# Patient Record
Sex: FEMALE | Race: WHITE | NOT HISPANIC OR LATINO
[De-identification: names, ages, dates, MRNs, and addresses within clinical notes are randomized per-mention and may not be internally consistent; named-entity substitution may affect disease eponyms.]

---

## 2018-05-29 ENCOUNTER — APPOINTMENT (OUTPATIENT)
Dept: PHYSICAL MEDICINE AND REHAB | Facility: CLINIC | Age: 78
End: 2018-05-29
Payer: MEDICARE

## 2018-05-29 VITALS
BODY MASS INDEX: 25.07 KG/M2 | SYSTOLIC BLOOD PRESSURE: 146 MMHG | DIASTOLIC BLOOD PRESSURE: 70 MMHG | TEMPERATURE: 98.6 F | OXYGEN SATURATION: 98 % | WEIGHT: 156 LBS | HEIGHT: 66 IN | HEART RATE: 79 BPM | RESPIRATION RATE: 18 BRPM

## 2018-05-29 DIAGNOSIS — R29.898 OTHER SYMPTOMS AND SIGNS INVOLVING THE MUSCULOSKELETAL SYSTEM: ICD-10-CM

## 2018-05-29 PROCEDURE — 99203 OFFICE O/P NEW LOW 30 MIN: CPT

## 2018-05-29 RX ORDER — ALPRAZOLAM 2 MG/1
2 TABLET ORAL
Refills: 0 | Status: ACTIVE | COMMUNITY

## 2018-05-29 RX ORDER — NAPROXEN 500 MG/1
500 TABLET ORAL
Refills: 0 | Status: ACTIVE | COMMUNITY

## 2018-05-29 RX ORDER — MULTIVIT-MIN/FOLIC/VIT K/LYCOP 400-300MCG
10 MCG TABLET ORAL
Refills: 0 | Status: ACTIVE | COMMUNITY

## 2018-05-29 RX ORDER — CYANOCOBALAMIN (VITAMIN B-12) 100 MCG
100 TABLET ORAL
Refills: 0 | Status: ACTIVE | COMMUNITY

## 2018-05-29 RX ORDER — FLUTICASONE PROPIONATE AND SALMETEROL 50; 100 UG/1; UG/1
100-50 POWDER RESPIRATORY (INHALATION)
Refills: 0 | Status: ACTIVE | COMMUNITY

## 2018-05-29 RX ORDER — VALSARTAN 160 MG/1
160 TABLET, COATED ORAL
Refills: 0 | Status: ACTIVE | COMMUNITY

## 2018-05-29 RX ORDER — ASPIRIN 81 MG/1
81 TABLET, CHEWABLE ORAL
Refills: 0 | Status: ACTIVE | COMMUNITY

## 2018-05-29 RX ORDER — ALBUTEROL SULFATE 90 UG/1
108 (90 BASE) AEROSOL, METERED RESPIRATORY (INHALATION)
Refills: 0 | Status: ACTIVE | COMMUNITY

## 2018-05-29 RX ORDER — ASCORBIC ACID 500 MG
500 TABLET ORAL
Refills: 0 | Status: ACTIVE | COMMUNITY

## 2018-05-29 RX ORDER — ATORVASTATIN CALCIUM 80 MG/1
80 TABLET, FILM COATED ORAL
Refills: 0 | Status: ACTIVE | COMMUNITY

## 2019-02-19 ENCOUNTER — APPOINTMENT (OUTPATIENT)
Dept: BREAST CENTER | Facility: CLINIC | Age: 79
End: 2019-02-19
Payer: MEDICARE

## 2019-02-19 VITALS — BODY MASS INDEX: 24.8 KG/M2 | WEIGHT: 158 LBS | HEIGHT: 67 IN

## 2019-02-19 VITALS — DIASTOLIC BLOOD PRESSURE: 75 MMHG | SYSTOLIC BLOOD PRESSURE: 153 MMHG | HEART RATE: 62 BPM

## 2019-02-19 DIAGNOSIS — Z85.3 PERSONAL HISTORY OF MALIGNANT NEOPLASM OF BREAST: ICD-10-CM

## 2019-02-19 PROCEDURE — 99215 OFFICE O/P EST HI 40 MIN: CPT

## 2019-02-19 RX ORDER — HYDROCORTISONE ACETATE 25 MG/1
25 SUPPOSITORY RECTAL
Refills: 0 | Status: ACTIVE | COMMUNITY

## 2019-02-19 RX ORDER — CLARITHROMYCIN 500 MG/1
TABLET, FILM COATED ORAL
Refills: 0 | Status: ACTIVE | COMMUNITY

## 2019-02-19 NOTE — HISTORY OF PRESENT ILLNESS
[FreeTextEntry1] : This is a 78  year old female s/p right lumpectomy in 11/2004 (Dr. 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 Pt did not have radiation therapy and tumor board consensus was no RT. [place] [facility] [place].\par \par Patient has no breast complaints today but approximately 3 weeks ago she had severe itching in the right lateral breast for 4 days.  This resolved with hydrocortisone.\par \par She does SBE irregularly.\par She has not noticed a change in her breast or a breast lump.\par She has not noticed a change in her nipple or nipple area.\par She has not noticed a change in the skin of the breast.\par She is not experiencing nipple discharge.\par She is not experiencing breast pain. She has bilateral breast sensitivity which is chronic.\par She has not noticed a lump or lymph node under the armpit. \par \par BREAST CANCER RISK FACTORS\par Menarche:  14\par Menopause: 57\par Grav:   0  Para: 0\par Age at first live birth: n/a\par Nursed: n/a\par Hysterectomy: N\par Oophorectomy: N\par OCP: Y 1y\par HRT: N\par Last pap/pelvic exam: 1/2018 WNL Pt recently had some spotting bur workup shoed no suspicious findings. \par Related family history: mat aunt BCA@70\par Ashkenazi: N\par Ty[place]/NCI lifetime risk: n/a\par BRCA testing: N\par \par Last mammogram:  11/26/2018                           Location: [place]\par Report reviewed.                                 Images reviewed.\par Results: Birads 2\par Heterogeneously dense breast tissue. No evidence of malignancy\par \par Last ultrasound:   11/26/2018                              Location: [place]\par Report reviewed.                                 Images reviewed. \par Results: Birads 2\par Heterogeneously dense breast tissue. No evidence of malignancy\par \par Last MRI:   3/30/2016                                          Location: [place]\par Report reviewed.\par Results: Birads 2\par No evidnece of malignancy\par

## 2019-02-19 NOTE — ASSESSMENT
[FreeTextEntry1] : This is a 78-year-old high-risk patient\par When mammogram and ultrasound November December 2019\par We reviewed risk reduction strategies including maintaining a BMI <25, limiting red meat intake and alcoholic beverages to 3 per week and exercise (150 min/ week low intensity or 75 min/week high intensity). And maintaining a normal vitamin D level.\par \par folllow up with me in 1 year\par

## 2019-02-19 NOTE — REVIEW OF SYSTEMS
[Cough] : cough [Joint Pain] : joint pain [Joint Swelling] : joint swelling [Anxiety] : anxiety [Negative] : Neurological

## 2020-02-18 ENCOUNTER — APPOINTMENT (OUTPATIENT)
Dept: BREAST CENTER | Facility: CLINIC | Age: 80
End: 2020-02-18

## 2020-11-16 ENCOUNTER — APPOINTMENT (OUTPATIENT)
Dept: BREAST CENTER | Facility: CLINIC | Age: 80
End: 2020-11-16
Payer: MEDICARE

## 2020-11-16 VITALS
HEIGHT: 67 IN | DIASTOLIC BLOOD PRESSURE: 72 MMHG | BODY MASS INDEX: 24.8 KG/M2 | HEART RATE: 64 BPM | SYSTOLIC BLOOD PRESSURE: 157 MMHG | WEIGHT: 158 LBS

## 2020-11-16 DIAGNOSIS — Z82.69 FAMILY HISTORY OF OTHER DISEASES OF THE MUSCULOSKELETAL SYSTEM AND CONNECTIVE TISSUE: ICD-10-CM

## 2020-11-16 DIAGNOSIS — Z80.3 FAMILY HISTORY OF MALIGNANT NEOPLASM OF BREAST: ICD-10-CM

## 2020-11-16 PROCEDURE — 99215 OFFICE O/P EST HI 40 MIN: CPT

## 2020-11-16 RX ORDER — PANTOPRAZOLE SODIUM 20 MG/1
TABLET, DELAYED RELEASE ORAL
Refills: 0 | Status: ACTIVE | COMMUNITY

## 2020-11-16 RX ORDER — FERROUS GLUCONATE 225(27)MG
240 (27 FE) TABLET ORAL
Refills: 0 | Status: DISCONTINUED | COMMUNITY
End: 2020-11-16

## 2020-11-16 RX ORDER — MONTELUKAST SODIUM 10 MG/1
10 TABLET, FILM COATED ORAL
Refills: 0 | Status: DISCONTINUED | COMMUNITY
End: 2020-11-16

## 2020-11-16 RX ORDER — VALSARTAN 40 MG/1
TABLET ORAL
Refills: 0 | Status: DISCONTINUED | COMMUNITY
End: 2020-11-16

## 2020-11-16 RX ORDER — ALBUTEROL SULFATE 90 UG/1
INHALANT RESPIRATORY (INHALATION)
Refills: 0 | Status: DISCONTINUED | COMMUNITY
End: 2020-11-16

## 2020-11-16 NOTE — ASSESSMENT
[FreeTextEntry1] : This is a 80-year-old high-risk patient\par When mammogram and ultrasound November 2021\par We reviewed risk reduction strategies including maintaining a BMI <25, limiting red meat intake and alcoholic beverages to 3 per week and exercise (150 min/ week low intensity or 75 min/week high intensity). And maintaining a normal vitamin D level.\par \par folllow up with me in 1 year\par She knows to call or return sooner should any concerns or questions arise.\par \par

## 2020-11-16 NOTE — PHYSICAL EXAM
[Normocephalic] : normocephalic [Atraumatic] : atraumatic [Supple] : supple [No Supraclavicular Adenopathy] : no supraclavicular adenopathy [Examined in the supine and seated position] : examined in the supine and seated position [No dominant masses] : no dominant masses in right breast  [No dominant masses] : no dominant masses left breast [No Nipple Retraction] : no left nipple retraction [No Nipple Discharge] : no left nipple discharge [No Axillary Lymphadenopathy] : no left axillary lymphadenopathy [No Edema] : no edema [No Rashes] : no rashes [No Ulceration] : no ulceration [Symmetrical] : symmetrical [de-identified] : very sensitive to touch [de-identified] : healed periareolar incision [de-identified] : healed periareolar incision

## 2020-11-16 NOTE — HISTORY OF PRESENT ILLNESS
[FreeTextEntry1] : This is a 80 year old female s/p right lumpectomy in 11/2004 (Dr. 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 Pt did not have radiation therapy and tumor board consensus was no RT. She also had 2nd opinion at [place] also supporting no RTx.\par \par Patient has no breast complaints today.\par \par She does SBE irregularly.\par She has not noticed a change in her breast or a breast lump.\par She has not noticed a change in her nipple or nipple area.\par She has not noticed a change in the skin of the breast.\par She is not experiencing nipple discharge.\par She is not experiencing breast pain. She has bilateral breast sensitivity which is chronic.\par She has not noticed a lump or lymph node under the armpit. \par \par BREAST CANCER RISK FACTORS\par Menarche:  14\par Menopause: 57\par Grav:   0  Para: 0\par Age at first live birth: n/a\par Nursed: n/a\par Hysterectomy: N\par Oophorectomy: N\par OCP: Y 1y\par HRT: N\par Last pap/pelvic exam: Spring 2020 WNL \par Related family history: mat aunt BCA@70\par Ashkenazi: N\par Tyrer-[place]/NCI lifetime risk: n/a\par BRCA testing: N\par \par Last mammogram:  11/16/2020                          Location: [place]\par Report unavailable for review.                                 Images reviewed.\par \par Last ultrasound:   11/16/2020                             [place]: [place]\par Report unavailable review.                                 Images reviewed. \par \par \par Last MRI:   3/30/2016                                          Location: [place]\par Report reviewed.\par Results: Birads 2\par No evidence of malignancy\par

## 2021-12-06 ENCOUNTER — APPOINTMENT (OUTPATIENT)
Dept: BREAST CENTER | Facility: CLINIC | Age: 81
End: 2021-12-06
Payer: MEDICARE

## 2021-12-06 ENCOUNTER — NON-APPOINTMENT (OUTPATIENT)
Age: 81
End: 2021-12-06

## 2021-12-06 VITALS
BODY MASS INDEX: 24.8 KG/M2 | WEIGHT: 158 LBS | HEIGHT: 67 IN | HEART RATE: 65 BPM | DIASTOLIC BLOOD PRESSURE: 73 MMHG | SYSTOLIC BLOOD PRESSURE: 135 MMHG

## 2021-12-06 PROCEDURE — 99214 OFFICE O/P EST MOD 30 MIN: CPT

## 2021-12-06 RX ORDER — DICLOFENAC SODIUM 1% 10 MG/G
1 GEL TOPICAL
Qty: 300 | Refills: 0 | Status: ACTIVE | COMMUNITY
Start: 2021-10-15

## 2021-12-06 RX ORDER — CLINDAMYCIN HYDROCHLORIDE 150 MG/1
150 CAPSULE ORAL
Qty: 12 | Refills: 0 | Status: ACTIVE | COMMUNITY
Start: 2021-09-23

## 2021-12-06 RX ORDER — DILTIAZEM HYDROCHLORIDE 120 MG/1
120 CAPSULE, EXTENDED RELEASE ORAL
Qty: 180 | Refills: 0 | Status: ACTIVE | COMMUNITY
Start: 2021-07-13

## 2021-12-06 RX ORDER — ALENDRONATE SODIUM 70 MG/1
70 TABLET ORAL
Qty: 12 | Refills: 0 | Status: ACTIVE | COMMUNITY
Start: 2021-10-13

## 2021-12-06 RX ORDER — HYDROXYCHLOROQUINE SULFATE 200 MG/1
200 TABLET, FILM COATED ORAL
Qty: 180 | Refills: 0 | Status: ACTIVE | COMMUNITY
Start: 2021-12-03

## 2021-12-06 RX ORDER — FAMOTIDINE 40 MG/1
40 TABLET, FILM COATED ORAL
Qty: 90 | Refills: 0 | Status: ACTIVE | COMMUNITY
Start: 2021-11-01

## 2021-12-06 RX ORDER — ESCITALOPRAM OXALATE 20 MG/1
20 TABLET ORAL
Qty: 90 | Refills: 0 | Status: ACTIVE | COMMUNITY
Start: 2021-11-01

## 2021-12-06 NOTE — PHYSICAL EXAM
[Normocephalic] : normocephalic [Atraumatic] : atraumatic [Supple] : supple [No Supraclavicular Adenopathy] : no supraclavicular adenopathy [Examined in the supine and seated position] : examined in the supine and seated position [Symmetrical] : symmetrical [No dominant masses] : no dominant masses in right breast  [No dominant masses] : no dominant masses left breast [No Nipple Retraction] : no left nipple retraction [No Nipple Discharge] : no left nipple discharge [No Axillary Lymphadenopathy] : no left axillary lymphadenopathy [No Edema] : no edema [No Rashes] : no rashes [No Ulceration] : no ulceration [de-identified] : very sensitive to touch [de-identified] : healed periareolar incision [de-identified] : healed periareolar incision

## 2021-12-06 NOTE — ASSESSMENT
[FreeTextEntry1] : This is a 81-year-old high-risk patient\par When mammogram and ultrasound November 2022-plan DYLAN\par We reviewed risk reduction strategies including maintaining a BMI <25, limiting red meat intake and alcoholic beverages to 3 per week and exercise (150 min/ week low intensity or 75 min/week high intensity). And maintaining a normal vitamin D level.\par \par folllow up with me in 1 year\par She knows to call or return sooner should any concerns or questions arise.\par \par

## 2021-12-06 NOTE — HISTORY OF PRESENT ILLNESS
[FreeTextEntry1] : This is a 81 year old female s/p right lumpectomy in 11/2004 (Dr. 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 Pt did not have radiation therapy and tumor board consensus was no RT. [place] [facility] [place].\par \par Patient has no breast complaints today.\par \par She does SBE irregularly.\par She has not noticed a change in her breast or a breast lump.\par She has not noticed a change in her nipple or nipple area.\par She has not noticed a change in the skin of the breast.\par She is not experiencing nipple discharge.\par She is not experiencing breast pain. She has bilateral breast sensitivity which is chronic.\par She has not noticed a lump or lymph node under the armpit. \par \par BREAST CANCER RISK FACTORS\par Menarche:  14\par Menopause: 57\par Grav:   0  Para: 0\par Age at first live birth: n/a\par Nursed: n/a\par Hysterectomy: N\par Oophorectomy: N\par OCP: Y 1y\par HRT: N\par Last pap/pelvic exam: Spring 2020 WNL \par Related family history: mat aunt BCA@70\par Ashkenazi: N\par Tyrer-[place]/NCI lifetime risk: n/a\par BRCA testing: N\par \par Last mammogram:  11/23/2021                         [place]: [place]\par Report  reviewed.                                             Images reviewed.\par Results: BIRADS 2\par dense breast tissue with no evidence of malignancy. \par \par Last ultrasound:   11/23/2021                           [place]: [place]\par Report  reviewed.                                           Images reviewed. \par Results: BIRADS 2\par no evidence of malignancy. \par \par Last MRI:   3/30/2016                                          Location: [place]\par Report reviewed.\par Results: Birads 2\par No evidence of malignancy\par

## 2022-11-27 ENCOUNTER — RESULT REVIEW (OUTPATIENT)
Age: 82
End: 2022-11-27

## 2022-11-29 ENCOUNTER — NON-APPOINTMENT (OUTPATIENT)
Age: 82
End: 2022-11-29

## 2022-12-06 ENCOUNTER — APPOINTMENT (OUTPATIENT)
Dept: BREAST CENTER | Facility: CLINIC | Age: 82
End: 2022-12-06

## 2022-12-06 VITALS
SYSTOLIC BLOOD PRESSURE: 123 MMHG | HEIGHT: 67 IN | DIASTOLIC BLOOD PRESSURE: 63 MMHG | BODY MASS INDEX: 24.8 KG/M2 | HEART RATE: 71 BPM | WEIGHT: 158 LBS

## 2022-12-06 DIAGNOSIS — R92.2 INCONCLUSIVE MAMMOGRAM: ICD-10-CM

## 2022-12-06 DIAGNOSIS — Z12.31 ENCOUNTER FOR SCREENING MAMMOGRAM FOR MALIGNANT NEOPLASM OF BREAST: ICD-10-CM

## 2022-12-06 PROCEDURE — 99214 OFFICE O/P EST MOD 30 MIN: CPT

## 2022-12-06 NOTE — PHYSICAL EXAM
[Normocephalic] : normocephalic [Atraumatic] : atraumatic [Supple] : supple [No Supraclavicular Adenopathy] : no supraclavicular adenopathy [Examined in the supine and seated position] : examined in the supine and seated position [Symmetrical] : symmetrical [No dominant masses] : no dominant masses in right breast  [No dominant masses] : no dominant masses left breast [No Nipple Retraction] : no left nipple retraction [No Nipple Discharge] : no left nipple discharge [No Axillary Lymphadenopathy] : no left axillary lymphadenopathy [No Edema] : no edema [No Rashes] : no rashes [No Ulceration] : no ulceration [de-identified] : very sensitive to touch [de-identified] : healed periareolar incision [de-identified] : healed periareolar incision [de-identified] : limited ROM right arm

## 2022-12-06 NOTE — HISTORY OF PRESENT ILLNESS
[FreeTextEntry1] : This is a 82 year old female s/p right lumpectomy in 11/2004 (Dr. 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 Pt did not have radiation therapy and tumor board consensus was no RT. She also had 2nd opinion [place] [facility] [place] supporting no RTx.\par \par Patient has no breast complaints today.\par \par She does SBE irregularly.\par She has not noticed a change in her breast or a breast lump.\par She has not noticed a change in her nipple or nipple area.\par She has not noticed a change in the skin of the breast.\par She is not experiencing nipple discharge.\par She is not experiencing breast pain. She has bilateral breast sensitivity which is chronic.\par She has not noticed a lump or lymph node under the armpit. \par \par BREAST CANCER RISK FACTORS\par Menarche:  14\par Menopause: 57\par Grav:   0  Para: 0\par Age at first live birth: n/a\par Nursed: n/a\par Hysterectomy: N\par Oophorectomy: N\par OCP: Y 1y\par HRT: N\par Last pap/pelvic exam: Spring 2020 WNL \par Related family history: mat aunt BCA@70\par Ashkenazi: N\par Ty[place]/NCI lifetime risk: n/a\par BRCA testing: N\par \par Last mammogram:  11/28/2022                       Location: [place]\par Report  reviewed.                                             Images reviewed.\par Results: BIRADS 2\par dense breast tissue with no evidence of malignancy. \par \par Last ultrasound:   11/28/2022                       Location: [place]\par Report  reviewed.                                           Images reviewed. \par Results: BIRADS 2\par no evidence of malignancy. \par \par Last MRI:   3/30/2016                                          Location: [place]\par Report reviewed.\par Results: Birads 2\par No evidence of malignancy\par

## 2022-12-06 NOTE — ASSESSMENT
[FreeTextEntry1] : This is a 82-year-old high-risk patient\par When mammogram and ultrasound November 2023-plan DYLAN\par she states she would need sedation for MRI\par We reviewed risk reduction strategies including maintaining a BMI <25, limiting red meat intake and alcoholic beverages to 3 per week and exercise (150 min/ week low intensity or 75 min/week high intensity). And maintaining a normal vitamin D level.\par \par folllow up with me in 1 year\par She knows to call or return sooner should any concerns or questions arise.\par \par

## 2023-11-30 ENCOUNTER — RESULT REVIEW (OUTPATIENT)
Age: 83
End: 2023-11-30

## 2024-03-11 NOTE — END OF VISIT
[FreeTextEntry3] : I Gianna Hathaway, acting as scribe, attest that this documentation has been prepared under the direction and in the presence of Provider Robi Salguero MD.   I was physically present during the service to the patient and/or personally examined the patient and I was directly involved in the management plan and recommendations of the care provided to the patient.   I Dr. Salguero, reviewed the history, the physical exam, and plan as documented by the PA. The documentation recorded by the PA, in my presence, accurately reflects the service I personally performed, and the decisions made by me with my edits as appropriate.  Robi Salguero MD

## 2024-03-12 ENCOUNTER — APPOINTMENT (OUTPATIENT)
Dept: ORTHOPEDIC SURGERY | Facility: CLINIC | Age: 84
End: 2024-03-12
Payer: MEDICARE

## 2024-03-12 PROCEDURE — 72080 X-RAY EXAM THORACOLMB 2/> VW: CPT

## 2024-03-12 PROCEDURE — 99202 OFFICE O/P NEW SF 15 MIN: CPT

## 2024-03-12 NOTE — ASSESSMENT
[FreeTextEntry1] : JANES TYLER is a 83 year old female being seen for an initial visit for back pain and thoracic compression fracture. 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 Reports no saddle anesthesia or bowel or bladder dysfunction. +SHX L2-4 posterior instrumented spinal fusion.

## 2024-03-12 NOTE — HISTORY OF PRESENT ILLNESS
[de-identified] :  83 year F referred by Dr. Kal Clark presents with complaint of LBP and LE weakness x 3-4 weeks. +SHx: L2-4 posterior instrumented spinal fusion 11 years ago performed by Dr. Salguero. Radiation: none. Onset: slip and fall in bathtub 3-4 weeks ago. Pain is described as constant, 2/10 at time of visit (sitting/at rest) and 10/10 at worst. Symptoms are worse in AM, bending forward, standing up from sitting. Symptoms improve with prn tramadol and naprosyn. She reports b/l LE (Lt>Rt) numbness, tingling and weakness as well as more frequent falls. Denies leg pain, bowel or bladder dysfunction or saddle anesthesia.   Reports [ ].  Denies [ ].

## 2024-03-12 NOTE — PHYSICAL EXAM
[Cane] : ambulates with cane [Antalgic] : antalgic [] : Motor: [___/5] : left ([unfilled]/5) [Motor Strength Lower Extremities] : right (5/5) [Normal] : Alert and in no acute distress [LE] : Sensory: Intact in bilateral lower extremities [de-identified] : Thoracolumbar XR: T12 compression fracture, instrumentation L2-4 (no Lt L3 screw) Lumbar MRI: severe stenosis at T12 due to T11-T12 disc fragment, T12 compression fracture. Lumbar MRI was uploaded to [facility].   [de-identified] : thoracolumbar spine -ttp throughout

## 2024-03-12 NOTE — PLAN
[TextEntry] : - Dr. Salguero will review lumbar MRI and present her case during spine conference. He will contact her with the plan afterwards which will likely involve instrumented posterior spinal fusion.  Call the office with any questions, concerns, or worsening symptoms. Plan was discussed with patient whom expressed understanding and agreed with plan.

## 2024-03-12 NOTE — REASON FOR VISIT
[Initial Visit] : an initial visit for [Back Pain] : back pain [Other: ____] : [unfilled] [Spinal Stenosis] : spinal stenosis [Spouse] : spouse

## 2024-04-02 ENCOUNTER — APPOINTMENT (OUTPATIENT)
Dept: ORTHOPEDIC SURGERY | Facility: CLINIC | Age: 84
End: 2024-04-02
Payer: MEDICARE

## 2024-04-02 VITALS — HEIGHT: 67 IN | WEIGHT: 158 LBS | BODY MASS INDEX: 24.8 KG/M2 | TEMPERATURE: 97.2 F

## 2024-04-02 DIAGNOSIS — S22.000D WEDGE COMPRESSION FRACTURE OF UNSPECIFIED THORACIC VERTEBRA, SUBSEQUENT ENCOUNTER FOR FRACTURE WITH ROUTINE HEALING: ICD-10-CM

## 2024-04-02 DIAGNOSIS — S22.080A WEDGE COMPRESSION FRACTURE OF T11-T12 VERTEBRA, INITIAL ENCOUNTER FOR CLOSED FRACTURE: ICD-10-CM

## 2024-04-02 PROCEDURE — 99212 OFFICE O/P EST SF 10 MIN: CPT | Mod: 57

## 2024-04-03 PROBLEM — S22.080A COMPRESSION FRACTURE OF T12 VERTEBRA, INITIAL ENCOUNTER: Status: RESOLVED | Noted: 2024-03-12 | Resolved: 2024-04-03

## 2024-04-03 PROBLEM — S22.000D COMPRESSION FRACTURE OF THORACIC VERTEBRA WITH ROUTINE HEALING, UNSPECIFIED THORACIC VERTEBRAL LEVEL, SUBSEQUENT ENCOUNTER: Status: ACTIVE | Noted: 2024-04-03

## 2024-04-03 NOTE — ASSESSMENT
[FreeTextEntry1] : Komal presents with kyphosis, disc herniation, and stenosis proximal to her prior L2 to L4 fusion. She is considering surgical intervention due to the severity of her pain and stenosis.

## 2024-04-03 NOTE — HISTORY OF PRESENT ILLNESS
[de-identified] :  83 year F referred by Dr. Kal Clark presents with complaint of LBP and LE weakness x 2 months ago. +SHx: L2-4 posterior instrumented spinal fusion 11 years ago performed by Dr. Salguero. Radiation: none. Onset: slip and fall in bathtub 2 months ago. Pain is described as constant, 10/10 at worst. Symptoms are worse in AM, bending forward, standing up from sitting. Symptoms improve with prn tramadol and naprosyn. She reports b/l LE (Lt>Rt) numbness, tingling and weakness as well as more frequent falls. Denies leg pain, bowel or bladder dysfunction or saddle anesthesia.

## 2024-04-03 NOTE — PHYSICAL EXAM
[Antalgic] : antalgic [] : Motor: [Cane] : ambulates with cane [___/5] : left ([unfilled]/5) [Motor Strength Lower Extremities] : left (5/5) [LE] : Sensory: Intact in bilateral lower extremities [Normal] : Alert and in no acute distress [de-identified] : thoracolumbar spine -ttp throughout [de-identified] : Thoracolumbar XR: T12 compression fracture, instrumentation L2-4 (no Lt L3 screw) Thoracic and lumbar MRI: severe stenosis at T12 due to T11-T12 disc fragment, T12 compression fracture.  Kyphosis, disc herniation, and stenosis proximal to her prior L2 to L4 fusion

## 2024-04-03 NOTE — PLAN
[TextEntry] : The proposed surgical intervention would entail extending her fusion approximately to T9 with a tether. Disc herniation at T9 needs to be bypassed. She wishes to proceed with this surgical intervention.

## 2024-04-03 NOTE — REASON FOR VISIT
[_______] : erin KRUSE  for [Back Pain] : back pain [Spinal Stenosis] : spinal stenosis [Other: ____] : [unfilled] [Spouse] : spouse

## 2024-04-03 NOTE — DISCUSSION/SUMMARY
[Surgical risks reviewed] : Surgical risks reviewed [de-identified] : The risks were explained in detail which include, but are not limited to, the risk of adjacent segment degeneration, pseudoarthrosis, hernia, bowel injury, ureteral injury, vascular injury, worsening numbness, tingling, pins and needs, weakness, pain, paralysis, nerve injury, dural tear, infection, bleeding, heart attack, stroke, pulmonary embolism, DVT, need for reoperation and others.

## 2024-04-12 RX ORDER — POVIDONE-IODINE 5 %
1 AEROSOL (ML) TOPICAL ONCE
Refills: 0 | Status: COMPLETED | OUTPATIENT
Start: 2024-04-15 | End: 2024-04-15

## 2024-04-12 NOTE — H&P ADULT - NSHPLABSRESULTS_GEN_ALL_CORE
preop cbc/bmp/coags/ua wnl per medical clearance   Cr 1.2  H/H 10.3/33  EKG- wnl per medical clearance   Echo EF 77% 3/24  Stress test 3/24 no ischemia  Preop chest x-ray wnl per clearance

## 2024-04-12 NOTE — H&P ADULT - PROBLEM SELECTOR PLAN 1
Admit to Orthopaedic Service.  Presents today for elective   Pt medically stable and cleared for procedure today by Dr. Calvillo (cardio) and CHRISS Roblero.

## 2024-04-12 NOTE — H&P ADULT - NSICDXPASTSURGICALHX_GEN_ALL_CORE_FT
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

## 2024-04-12 NOTE — H&P ADULT - HISTORY OF PRESENT ILLNESS
84yo f c/o back pain x   Presents today for elective T9-PELVIS, WINIFRED, exploration of fusion, laminectomy, PSF, TLIF.  82yo female with acute on chronic back pain, worsening in the last 3 weeks without inciting accident or injury. Patient reports her back pain has been unbearable in the last couple weeks where she wakes up in 10/10 stabbing pain. Her pain radiates down both legs with the left being worse than the right. She states she has the most difficulty with laying to sitting/standing transfers. She reports she had an episode a couple of weeks ago where both of her legs went numb. She then went to sleep and states her sensation had returned by morning. She has had 5 prior back surgeries that took place between [id number]. She has been taking Tramadol and Tylenol at home for pain with  minimal relief. Pain persists despite conservative measures. Ambulates with the assistance of a cane. Of note, she reports she currently has two rotator cuff tears.   Denies history of blood clots/seizures. Denies CP/SOB/N/V.     Presents today for elective T9-PELVIS, WINIFRED, exploration of fusion, laminectomy, PSF, TLIF.

## 2024-04-12 NOTE — H&P ADULT - NSHPPHYSICALEXAM_GEN_ALL_CORE
GENERAL:  PE:  Decreased ROM secondary to pain. Rest of PE per medical clearance. Gen: Alert and oriented, NAD  MSK: Decreased ROM to thoracic and lumbar spine 2/2 pain  No evidence of deformity or open wound  2+ DP pulses palpable bilaterally, skin wwp, cap refill brisk, no calf tenderness bilaterally  SILT to L2-S1 dermatomes bilaterally  Quad/TA/GS/EHL/FHL 5/5 bilaterally    Rest of PE Per medical clearance note

## 2024-04-14 ENCOUNTER — TRANSCRIPTION ENCOUNTER (OUTPATIENT)
Age: 84
End: 2024-04-14

## 2024-04-15 ENCOUNTER — APPOINTMENT (OUTPATIENT)
Dept: ORTHOPEDIC SURGERY | Facility: HOSPITAL | Age: 84
End: 2024-04-15

## 2024-04-15 ENCOUNTER — INPATIENT (INPATIENT)
Facility: HOSPITAL | Age: 84
LOS: 3 days | Discharge: NURSING, REHAB OR LONG-TERM CARE | End: 2024-04-19
Payer: MEDICARE

## 2024-04-15 VITALS
TEMPERATURE: 99 F | HEIGHT: 66 IN | HEART RATE: 86 BPM | DIASTOLIC BLOOD PRESSURE: 71 MMHG | SYSTOLIC BLOOD PRESSURE: 165 MMHG | OXYGEN SATURATION: 97 % | WEIGHT: 156.31 LBS | RESPIRATION RATE: 16 BRPM

## 2024-04-15 DIAGNOSIS — M47.9 SPONDYLOSIS, UNSPECIFIED: ICD-10-CM

## 2024-04-15 DIAGNOSIS — Z98.890 OTHER SPECIFIED POSTPROCEDURAL STATES: Chronic | ICD-10-CM

## 2024-04-15 DIAGNOSIS — E78.5 HYPERLIPIDEMIA, UNSPECIFIED: ICD-10-CM

## 2024-04-15 DIAGNOSIS — D05.12 INTRADUCTAL CARCINOMA IN SITU OF LEFT BREAST: Chronic | ICD-10-CM

## 2024-04-15 DIAGNOSIS — I25.10 ATHEROSCLEROTIC HEART DISEASE OF NATIVE CORONARY ARTERY WITHOUT ANGINA PECTORIS: ICD-10-CM

## 2024-04-15 DIAGNOSIS — F41.9 ANXIETY DISORDER, UNSPECIFIED: ICD-10-CM

## 2024-04-15 DIAGNOSIS — J45.909 UNSPECIFIED ASTHMA, UNCOMPLICATED: ICD-10-CM

## 2024-04-15 DIAGNOSIS — I10 ESSENTIAL (PRIMARY) HYPERTENSION: ICD-10-CM

## 2024-04-15 DIAGNOSIS — M48.04 SPINAL STENOSIS, THORACIC REGION: ICD-10-CM

## 2024-04-15 DIAGNOSIS — Z96.643 PRESENCE OF ARTIFICIAL HIP JOINT, BILATERAL: Chronic | ICD-10-CM

## 2024-04-15 DIAGNOSIS — N18.30 CHRONIC KIDNEY DISEASE, STAGE 3 UNSPECIFIED: ICD-10-CM

## 2024-04-15 DIAGNOSIS — Z98.49 CATARACT EXTRACTION STATUS, UNSPECIFIED EYE: Chronic | ICD-10-CM

## 2024-04-15 DIAGNOSIS — M48.061 SPINAL STENOSIS, LUMBAR REGION WITHOUT NEUROGENIC CLAUDICATION: ICD-10-CM

## 2024-04-15 LAB
BASE EXCESS BLDA CALC-SCNC: -3.3 MMOL/L — LOW (ref -2–3)
BLD GP AB SCN SERPL QL: NEGATIVE — SIGNIFICANT CHANGE UP
CA-I BLDA-SCNC: 1.28 MMOL/L — SIGNIFICANT CHANGE UP (ref 1.15–1.33)
CO2 BLDA-SCNC: 22 MMOL/L — SIGNIFICANT CHANGE UP (ref 19–24)
COHGB MFR BLDA: 1.2 % — SIGNIFICANT CHANGE UP
GLUCOSE BLDA-MCNC: 141 MG/DL — HIGH (ref 70–99)
HCO3 BLDA-SCNC: 21 MMOL/L — SIGNIFICANT CHANGE UP (ref 21–28)
HGB BLDA-MCNC: 8.6 G/DL — LOW (ref 11.7–16.1)
METHGB MFR BLDA: 0.3 % — SIGNIFICANT CHANGE UP
OXYHGB MFR BLDA: 98.3 % — HIGH (ref 90–95)
PCO2 BLDA: 35 MMHG — SIGNIFICANT CHANGE UP (ref 32–45)
PH BLDA: 7.39 — SIGNIFICANT CHANGE UP (ref 7.35–7.45)
PO2 BLDA: 224 MMHG — HIGH (ref 83–108)
POTASSIUM BLDA-SCNC: 3.6 MMOL/L — SIGNIFICANT CHANGE UP (ref 3.5–5.1)
RH IG SCN BLD-IMP: POSITIVE — SIGNIFICANT CHANGE UP
SAO2 % BLDA: 99.8 % — HIGH (ref 94–98)
SODIUM BLDA-SCNC: 135 MMOL/L — LOW (ref 136–145)

## 2024-04-15 PROCEDURE — 22212 INCIS 1 VERTEBRAL SEG THORAC: CPT | Mod: 1L

## 2024-04-15 PROCEDURE — 63047 LAM FACETEC & FORAMOT LUMBAR: CPT | Mod: 1L

## 2024-04-15 PROCEDURE — 22612 ARTHRD PST TQ 1NTRSPC LUMBAR: CPT

## 2024-04-15 PROCEDURE — 22852 REMOVE SPINE FIXATION DEVICE: CPT | Mod: 1L

## 2024-04-15 PROCEDURE — 22511 PERQ LUMBOSACRAL INJECTION: CPT | Mod: 1L

## 2024-04-15 PROCEDURE — 22614 ARTHRD PST TQ 1NTRSPC EA ADD: CPT

## 2024-04-15 PROCEDURE — 63048 LAM FACETEC &FORAMOT EA ADDL: CPT

## 2024-04-15 PROCEDURE — 20930 SP BONE ALGRFT MORSEL ADD-ON: CPT

## 2024-04-15 PROCEDURE — 22843 INSERT SPINE FIXATION DEVICE: CPT

## 2024-04-15 PROCEDURE — 20936 SP BONE AGRFT LOCAL ADD-ON: CPT

## 2024-04-15 PROCEDURE — 63707 REPAIR SPINAL FLUID LEAKAGE: CPT | Mod: 1L

## 2024-04-15 PROCEDURE — 22214 INCIS 1 VERTEBRAL SEG LUMBAR: CPT | Mod: 1L

## 2024-04-15 PROCEDURE — XXXXX: CPT | Mod: 1L

## 2024-04-15 PROCEDURE — 22830 EXPLORATION OF SPINAL FUSION: CPT | Mod: 1L

## 2024-04-15 DEVICE — CLAMP ASSEMBLY 5.5MM: Type: IMPLANTABLE DEVICE | Status: FUNCTIONAL

## 2024-04-15 DEVICE — SURGIFLO HEMOSTATIC MATRIX KIT: Type: IMPLANTABLE DEVICE | Status: FUNCTIONAL

## 2024-04-15 DEVICE — DURASEAL SEALANT 5ML: Type: IMPLANTABLE DEVICE | Status: FUNCTIONAL

## 2024-04-15 DEVICE — SCREW CORT VIPER FIX TI 6X40MM: Type: IMPLANTABLE DEVICE | Status: FUNCTIONAL

## 2024-04-15 DEVICE — BAND WIDE NILE ALT FIXATION 4MM: Type: IMPLANTABLE DEVICE | Status: FUNCTIONAL

## 2024-04-15 DEVICE — ROD 480MM: Type: IMPLANTABLE DEVICE | Status: FUNCTIONAL

## 2024-04-15 DEVICE — GWIRE NITINOL BLUNT 1.45X490MM: Type: IMPLANTABLE DEVICE | Status: FUNCTIONAL

## 2024-04-15 DEVICE — SCREW SET: Type: IMPLANTABLE DEVICE | Status: FUNCTIONAL

## 2024-04-15 DEVICE — SURGIFOAM PAD 8CM X 12.5CM X 10MM (100): Type: IMPLANTABLE DEVICE | Status: FUNCTIONAL

## 2024-04-15 DEVICE — CMT BN SPINE CONFIDENCE 11ML: Type: IMPLANTABLE DEVICE | Status: FUNCTIONAL

## 2024-04-15 DEVICE — SCREW VIPER CORT FIX 6X40MM: Type: IMPLANTABLE DEVICE | Status: FUNCTIONAL

## 2024-04-15 DEVICE — SCREW CORT VIPER FIX TI 6X45MM: Type: IMPLANTABLE DEVICE | Status: FUNCTIONAL

## 2024-04-15 DEVICE — SCREW CORT VIPER FIX TI 6X50MM: Type: IMPLANTABLE DEVICE | Status: FUNCTIONAL

## 2024-04-15 DEVICE — SCREW VIPER POLY FT 9X90MM: Type: IMPLANTABLE DEVICE | Status: FUNCTIONAL

## 2024-04-15 DEVICE — CONN O/O SD TOP NTCH 5.5X5.5 T: Type: IMPLANTABLE DEVICE | Status: FUNCTIONAL

## 2024-04-15 DEVICE — GRAFT BONE INFUSE KIT LG II: Type: IMPLANTABLE DEVICE | Status: FUNCTIONAL

## 2024-04-15 DEVICE — TACHOSIL 9.5 X 4.8CM: Type: IMPLANTABLE DEVICE | Status: FUNCTIONAL

## 2024-04-15 RX ORDER — ACETAMINOPHEN 500 MG
1000 TABLET ORAL ONCE
Refills: 0 | Status: DISCONTINUED | OUTPATIENT
Start: 2024-04-15 | End: 2024-04-16

## 2024-04-15 RX ORDER — TRAMADOL HYDROCHLORIDE 50 MG/1
50 TABLET ORAL EVERY 4 HOURS
Refills: 0 | Status: DISCONTINUED | OUTPATIENT
Start: 2024-04-15 | End: 2024-04-17

## 2024-04-15 RX ORDER — SODIUM CHLORIDE 9 MG/ML
1000 INJECTION, SOLUTION INTRAVENOUS
Refills: 0 | Status: DISCONTINUED | OUTPATIENT
Start: 2024-04-15 | End: 2024-04-18

## 2024-04-15 RX ORDER — VANCOMYCIN HCL 1 G
1000 VIAL (EA) INTRAVENOUS ONCE
Refills: 0 | Status: COMPLETED | OUTPATIENT
Start: 2024-04-15 | End: 2024-04-15

## 2024-04-15 RX ORDER — ONDANSETRON 8 MG/1
4 TABLET, FILM COATED ORAL ONCE
Refills: 0 | Status: COMPLETED | OUTPATIENT
Start: 2024-04-15 | End: 2024-04-15

## 2024-04-15 RX ORDER — SENNA PLUS 8.6 MG/1
2 TABLET ORAL AT BEDTIME
Refills: 0 | Status: DISCONTINUED | OUTPATIENT
Start: 2024-04-15 | End: 2024-04-19

## 2024-04-15 RX ORDER — METHOCARBAMOL 500 MG/1
500 TABLET, FILM COATED ORAL EVERY 8 HOURS
Refills: 0 | Status: DISCONTINUED | OUTPATIENT
Start: 2024-04-15 | End: 2024-04-19

## 2024-04-15 RX ORDER — POLYETHYLENE GLYCOL 3350 17 G/17G
17 POWDER, FOR SOLUTION ORAL DAILY
Refills: 0 | Status: DISCONTINUED | OUTPATIENT
Start: 2024-04-15 | End: 2024-04-19

## 2024-04-15 RX ORDER — OXYCODONE HYDROCHLORIDE 5 MG/1
5 TABLET ORAL EVERY 4 HOURS
Refills: 0 | Status: DISCONTINUED | OUTPATIENT
Start: 2024-04-15 | End: 2024-04-17

## 2024-04-15 RX ORDER — HYDROMORPHONE HYDROCHLORIDE 2 MG/ML
0.5 INJECTION INTRAMUSCULAR; INTRAVENOUS; SUBCUTANEOUS
Refills: 0 | Status: DISCONTINUED | OUTPATIENT
Start: 2024-04-15 | End: 2024-04-15

## 2024-04-15 RX ORDER — HYDROMORPHONE HYDROCHLORIDE 2 MG/ML
0.5 INJECTION INTRAMUSCULAR; INTRAVENOUS; SUBCUTANEOUS EVERY 4 HOURS
Refills: 0 | Status: DISCONTINUED | OUTPATIENT
Start: 2024-04-15 | End: 2024-04-16

## 2024-04-15 RX ORDER — ONDANSETRON 8 MG/1
4 TABLET, FILM COATED ORAL EVERY 6 HOURS
Refills: 0 | Status: DISCONTINUED | OUTPATIENT
Start: 2024-04-15 | End: 2024-04-16

## 2024-04-15 RX ORDER — APREPITANT 80 MG/1
40 CAPSULE ORAL ONCE
Refills: 0 | Status: COMPLETED | OUTPATIENT
Start: 2024-04-15 | End: 2024-04-15

## 2024-04-15 RX ORDER — ACETAMINOPHEN 500 MG
1000 TABLET ORAL ONCE
Refills: 0 | Status: COMPLETED | OUTPATIENT
Start: 2024-04-15 | End: 2024-04-15

## 2024-04-15 RX ORDER — ACETAMINOPHEN 500 MG
1000 TABLET ORAL EVERY 8 HOURS
Refills: 0 | Status: DISCONTINUED | OUTPATIENT
Start: 2024-04-15 | End: 2024-04-16

## 2024-04-15 RX ORDER — CHLORHEXIDINE GLUCONATE 213 G/1000ML
1 SOLUTION TOPICAL ONCE
Refills: 0 | Status: COMPLETED | OUTPATIENT
Start: 2024-04-15 | End: 2024-04-15

## 2024-04-15 RX ORDER — PANTOPRAZOLE SODIUM 20 MG/1
40 TABLET, DELAYED RELEASE ORAL
Refills: 0 | Status: DISCONTINUED | OUTPATIENT
Start: 2024-04-15 | End: 2024-04-19

## 2024-04-15 RX ADMIN — TRAMADOL HYDROCHLORIDE 50 MILLIGRAM(S): 50 TABLET ORAL at 17:25

## 2024-04-15 RX ADMIN — SENNA PLUS 2 TABLET(S): 8.6 TABLET ORAL at 21:29

## 2024-04-15 RX ADMIN — CHLORHEXIDINE GLUCONATE 1 APPLICATION(S): 213 SOLUTION TOPICAL at 07:19

## 2024-04-15 RX ADMIN — HYDROMORPHONE HYDROCHLORIDE 0.5 MILLIGRAM(S): 2 INJECTION INTRAMUSCULAR; INTRAVENOUS; SUBCUTANEOUS at 13:43

## 2024-04-15 RX ADMIN — Medication 1000 MILLIGRAM(S): at 21:28

## 2024-04-15 RX ADMIN — Medication 1 APPLICATION(S): at 07:19

## 2024-04-15 RX ADMIN — HYDROMORPHONE HYDROCHLORIDE 0.5 MILLIGRAM(S): 2 INJECTION INTRAMUSCULAR; INTRAVENOUS; SUBCUTANEOUS at 19:50

## 2024-04-15 RX ADMIN — HYDROMORPHONE HYDROCHLORIDE 0.5 MILLIGRAM(S): 2 INJECTION INTRAMUSCULAR; INTRAVENOUS; SUBCUTANEOUS at 19:35

## 2024-04-15 RX ADMIN — OXYCODONE HYDROCHLORIDE 5 MILLIGRAM(S): 5 TABLET ORAL at 21:28

## 2024-04-15 RX ADMIN — TRAMADOL HYDROCHLORIDE 50 MILLIGRAM(S): 50 TABLET ORAL at 16:50

## 2024-04-15 RX ADMIN — Medication 250 MILLIGRAM(S): at 21:29

## 2024-04-15 RX ADMIN — Medication 50 MILLIGRAM(S): at 21:28

## 2024-04-15 RX ADMIN — HYDROMORPHONE HYDROCHLORIDE 0.5 MILLIGRAM(S): 2 INJECTION INTRAMUSCULAR; INTRAVENOUS; SUBCUTANEOUS at 14:10

## 2024-04-15 RX ADMIN — HYDROMORPHONE HYDROCHLORIDE 0.5 MILLIGRAM(S): 2 INJECTION INTRAMUSCULAR; INTRAVENOUS; SUBCUTANEOUS at 14:16

## 2024-04-15 RX ADMIN — HYDROMORPHONE HYDROCHLORIDE 0.5 MILLIGRAM(S): 2 INJECTION INTRAMUSCULAR; INTRAVENOUS; SUBCUTANEOUS at 16:52

## 2024-04-15 RX ADMIN — Medication 1000 MILLIGRAM(S): at 22:15

## 2024-04-15 RX ADMIN — METHOCARBAMOL 500 MILLIGRAM(S): 500 TABLET, FILM COATED ORAL at 15:54

## 2024-04-15 RX ADMIN — Medication 50 MILLIGRAM(S): at 15:54

## 2024-04-15 RX ADMIN — APREPITANT 40 MILLIGRAM(S): 80 CAPSULE ORAL at 07:18

## 2024-04-15 RX ADMIN — Medication 1000 MILLIGRAM(S): at 07:17

## 2024-04-15 RX ADMIN — ONDANSETRON 4 MILLIGRAM(S): 8 TABLET, FILM COATED ORAL at 14:15

## 2024-04-15 RX ADMIN — OXYCODONE HYDROCHLORIDE 5 MILLIGRAM(S): 5 TABLET ORAL at 22:10

## 2024-04-15 RX ADMIN — METHOCARBAMOL 500 MILLIGRAM(S): 500 TABLET, FILM COATED ORAL at 21:29

## 2024-04-15 RX ADMIN — SODIUM CHLORIDE 75 MILLILITER(S): 9 INJECTION, SOLUTION INTRAVENOUS at 17:24

## 2024-04-15 RX ADMIN — HYDROMORPHONE HYDROCHLORIDE 0.5 MILLIGRAM(S): 2 INJECTION INTRAMUSCULAR; INTRAVENOUS; SUBCUTANEOUS at 16:15

## 2024-04-15 NOTE — PRE-ANESTHESIA EVALUATION ADULT - NSPROPOSEDPROCEDFT_GEN_ALL_CORE
T9-pelvis removal of hardware, exploration, posterior fusion, transforaminal interbody lumbar fusion

## 2024-04-15 NOTE — PATIENT PROFILE ADULT - FALL HARM RISK - RISK INTERVENTIONS

## 2024-04-15 NOTE — PRE-ANESTHESIA EVALUATION ADULT - NSANTHPEFT_GEN_ALL_CORE
General: AAOx3, NAD  Eyes: The sclera and conjunctiva normal, pupils equal in size.  ENT: The ears and nose were normal in appearance; oropharynx clear, moist mucus membranes.  Neck: The appearance of the neck was normal, with no gross masses or nodules.  Respiratory: Unlabored, no retractions.  CV: RRR  Neurological: No focal deficit, moves all extremities.  Exercise Tolerance:  METS limited

## 2024-04-15 NOTE — PROGRESS NOTE ADULT - SUBJECTIVE AND OBJECTIVE BOX
Orthopaedics Post Op Check    Procedure: T9-Pelvis Lami/PSF with proximal tethers, intra op dural tear   Surgeon: Dr. Salguero     Pt comfortable, without complaints  Denies CP, SOB, N/V, numbness/tingling     Vital Signs Last 24 Hrs  T(C): 37.7 (15 Apr 2024 13:20), Max: 37.7 (15 Apr 2024 13:20)  T(F): 99.8 (15 Apr 2024 13:20), Max: 99.8 (15 Apr 2024 13:20)  HR: 76 (15 Apr 2024 14:20) (68 - 86)  BP: 128/59 (15 Apr 2024 14:20) (117/59 - 165/71)  BP(mean): 87 (15 Apr 2024 14:20) (80 - 90)  RR: 14 (15 Apr 2024 14:20) (11 - 16)  SpO2: 97% (15 Apr 2024 14:20) (95% - 97%)    Parameters below as of 15 Apr 2024 14:20  Patient On (Oxygen Delivery Method): nasal cannula  O2 Flow (L/min): 2    AVSS, NAD    Dressing C/D/I; HV x 2 off suction   General: Pt Alert and oriented     Pulses: DP pulses palpable bilaterally   Sensation: SLT in tact to distal Bilateral lower extremities   Motor: EHL/FHL/TA/GS 5/5 motor strength bilateral lower extremities       Post op XR: fluoroscopy utilized intra op to confirm levels and hardware placement     A/P: 83yFemale POD#0 s/p T9-Pelvis Lami/PSF with proximal tethers, intra op dural tear   - Stable  - Pain Control  - DVT ppx: SCDs  - Post op abx: Ancef  - PT, WBS: Bed rest/HOB flat x 24h   - F/U AM Labs

## 2024-04-15 NOTE — ASU PREOP CHECKLIST - AS BP NONINV METHOD
7 29 20 AVASTIN AND REPEAT EVERY 5 WKS X 2 - MILD EDEMA AT 5 WKS - IMPROVING BUT STILL MILD. electronic

## 2024-04-15 NOTE — BRIEF OPERATIVE NOTE - ESTIMATED BLOOD LOSS
500 Double Island Pedicle Flap Text: The defect edges were debeveled with a #15 scalpel blade.  Given the location of the defect, shape of the defect and the proximity to free margins a double island pedicle advancement flap was deemed most appropriate.  Using a sterile surgical marker, an appropriate advancement flap was drawn incorporating the defect, outlining the appropriate donor tissue and placing the expected incisions within the relaxed skin tension lines where possible.    The area thus outlined was incised deep to adipose tissue with a #15 scalpel blade.  The skin margins were undermined to an appropriate distance in all directions around the primary defect and laterally outward around the island pedicle utilizing iris scissors.  There was minimal undermining beneath the pedicle flap.

## 2024-04-15 NOTE — BRIEF OPERATIVE NOTE - SECOND ASSISTANT
I called back as received a message pt had ques.  Pt stated she delivered her baby on 1/31/24. On 1/30/24 labs revealed:   HCV RNA: 6.5IU (prev 6/'23 of 1.9IU)  AST/ALT wnl  Alk Phos: 161 (prev 65 in 10/'23)    I discussed with pt that HCV RNA is know to fluctuate during pregnancy (often rises and peaks in 3rd trimester) and typically decreases postpartum. If persistent viremia is present then will need to discuss trtment.     2/7/24 3:54  I called pt back after speaking with Dr. King.Plan for labs late next week and f/u appt with Dr. King 2/19 at 8:30AM.   Pt states she is not breastfeeding as was advised after delivery by her provider while inpt not to.   It was previously mentioned from Dr. King from a hepatology standpoint, there is no contraindication to breastfeeding unless pt has local injury to nipples (ie cracking/bleeding/etc) which pt was aware however she decided not to breastfeed at this time.        No Assistant

## 2024-04-15 NOTE — BRIEF OPERATIVE NOTE - NSICDXBRIEFPOSTOP_GEN_ALL_CORE_FT
POST-OP DIAGNOSIS:  Thoracic stenosis 15-Apr-2024 13:04:12  Gianna Hathaway  Lumbar stenosis 15-Apr-2024 13:04:19  Gianna Hathaway

## 2024-04-15 NOTE — BRIEF OPERATIVE NOTE - NSICDXBRIEFPROCEDURE_GEN_ALL_CORE_FT
PROCEDURES:  Fusion of posterior lumbar spine 15-Apr-2024 13:03:26  Gianna Hathaway  Fusion, spine, thoracic, posterior approach 15-Apr-2024 13:03:39  Gianna Hathaway

## 2024-04-15 NOTE — BRIEF OPERATIVE NOTE - NSICDXBRIEFPREOP_GEN_ALL_CORE_FT
PRE-OP DIAGNOSIS:  Thoracic stenosis 15-Apr-2024 13:03:53  Gianna Hathaway  Lumbar stenosis 15-Apr-2024 13:03:59  Gianna Hathaway

## 2024-04-16 ENCOUNTER — TRANSCRIPTION ENCOUNTER (OUTPATIENT)
Age: 84
End: 2024-04-16

## 2024-04-16 LAB
ANION GAP SERPL CALC-SCNC: 9 MMOL/L — SIGNIFICANT CHANGE UP (ref 5–17)
BASOPHILS # BLD AUTO: 0.01 K/UL — SIGNIFICANT CHANGE UP (ref 0–0.2)
BASOPHILS NFR BLD AUTO: 0.1 % — SIGNIFICANT CHANGE UP (ref 0–2)
BUN SERPL-MCNC: 18 MG/DL — SIGNIFICANT CHANGE UP (ref 7–23)
CALCIUM SERPL-MCNC: 8.8 MG/DL — SIGNIFICANT CHANGE UP (ref 8.4–10.5)
CHLORIDE SERPL-SCNC: 107 MMOL/L — SIGNIFICANT CHANGE UP (ref 96–108)
CO2 SERPL-SCNC: 21 MMOL/L — LOW (ref 22–31)
CREAT SERPL-MCNC: 0.87 MG/DL — SIGNIFICANT CHANGE UP (ref 0.5–1.3)
EGFR: 67 ML/MIN/1.73M2 — SIGNIFICANT CHANGE UP
EOSINOPHIL # BLD AUTO: 0 K/UL — SIGNIFICANT CHANGE UP (ref 0–0.5)
EOSINOPHIL NFR BLD AUTO: 0 % — SIGNIFICANT CHANGE UP (ref 0–6)
GLUCOSE SERPL-MCNC: 125 MG/DL — HIGH (ref 70–99)
HCT VFR BLD CALC: 29.6 % — LOW (ref 34.5–45)
HGB BLD-MCNC: 9.7 G/DL — LOW (ref 11.5–15.5)
IMM GRANULOCYTES NFR BLD AUTO: 0.7 % — SIGNIFICANT CHANGE UP (ref 0–0.9)
LYMPHOCYTES # BLD AUTO: 0.54 K/UL — LOW (ref 1–3.3)
LYMPHOCYTES # BLD AUTO: 4.5 % — LOW (ref 13–44)
MCHC RBC-ENTMCNC: 30.7 PG — SIGNIFICANT CHANGE UP (ref 27–34)
MCHC RBC-ENTMCNC: 32.8 GM/DL — SIGNIFICANT CHANGE UP (ref 32–36)
MCV RBC AUTO: 93.7 FL — SIGNIFICANT CHANGE UP (ref 80–100)
MONOCYTES # BLD AUTO: 0.81 K/UL — SIGNIFICANT CHANGE UP (ref 0–0.9)
MONOCYTES NFR BLD AUTO: 6.8 % — SIGNIFICANT CHANGE UP (ref 2–14)
NEUTROPHILS # BLD AUTO: 10.53 K/UL — HIGH (ref 1.8–7.4)
NEUTROPHILS NFR BLD AUTO: 87.9 % — HIGH (ref 43–77)
NRBC # BLD: 0 /100 WBCS — SIGNIFICANT CHANGE UP (ref 0–0)
PLATELET # BLD AUTO: 155 K/UL — SIGNIFICANT CHANGE UP (ref 150–400)
POTASSIUM SERPL-MCNC: 5 MMOL/L — SIGNIFICANT CHANGE UP (ref 3.5–5.3)
POTASSIUM SERPL-SCNC: 5 MMOL/L — SIGNIFICANT CHANGE UP (ref 3.5–5.3)
RBC # BLD: 3.16 M/UL — LOW (ref 3.8–5.2)
RBC # FLD: 13.8 % — SIGNIFICANT CHANGE UP (ref 10.3–14.5)
SODIUM SERPL-SCNC: 137 MMOL/L — SIGNIFICANT CHANGE UP (ref 135–145)
WBC # BLD: 11.97 K/UL — HIGH (ref 3.8–10.5)
WBC # FLD AUTO: 11.97 K/UL — HIGH (ref 3.8–10.5)

## 2024-04-16 PROCEDURE — 99223 1ST HOSP IP/OBS HIGH 75: CPT

## 2024-04-16 RX ORDER — ACETAMINOPHEN 500 MG
1000 TABLET ORAL ONCE
Refills: 0 | Status: COMPLETED | OUTPATIENT
Start: 2024-04-16 | End: 2024-04-16

## 2024-04-16 RX ORDER — DULOXETINE HYDROCHLORIDE 30 MG/1
30 CAPSULE, DELAYED RELEASE ORAL DAILY
Refills: 0 | Status: DISCONTINUED | OUTPATIENT
Start: 2024-04-16 | End: 2024-04-19

## 2024-04-16 RX ORDER — ACETAMINOPHEN 500 MG
2 TABLET ORAL
Qty: 0 | Refills: 0 | DISCHARGE
Start: 2024-04-16

## 2024-04-16 RX ORDER — DILTIAZEM HCL 120 MG
120 CAPSULE, EXT RELEASE 24 HR ORAL
Refills: 0 | Status: DISCONTINUED | OUTPATIENT
Start: 2024-04-16 | End: 2024-04-19

## 2024-04-16 RX ORDER — VALSARTAN 80 MG/1
160 TABLET ORAL EVERY 12 HOURS
Refills: 0 | Status: DISCONTINUED | OUTPATIENT
Start: 2024-04-16 | End: 2024-04-17

## 2024-04-16 RX ORDER — DILTIAZEM HCL 120 MG
120 CAPSULE, EXT RELEASE 24 HR ORAL DAILY
Refills: 0 | Status: DISCONTINUED | OUTPATIENT
Start: 2024-04-16 | End: 2024-04-16

## 2024-04-16 RX ORDER — DULOXETINE HYDROCHLORIDE 30 MG/1
60 CAPSULE, DELAYED RELEASE ORAL DAILY
Refills: 0 | Status: DISCONTINUED | OUTPATIENT
Start: 2024-04-16 | End: 2024-04-19

## 2024-04-16 RX ORDER — ATORVASTATIN CALCIUM 80 MG/1
80 TABLET, FILM COATED ORAL AT BEDTIME
Refills: 0 | Status: DISCONTINUED | OUTPATIENT
Start: 2024-04-16 | End: 2024-04-19

## 2024-04-16 RX ORDER — ACETAMINOPHEN 500 MG
100 TABLET ORAL
Qty: 0 | Refills: 0 | DISCHARGE
Start: 2024-04-16

## 2024-04-16 RX ORDER — MAGNESIUM HYDROXIDE 400 MG/1
30 TABLET, CHEWABLE ORAL DAILY
Refills: 0 | Status: DISCONTINUED | OUTPATIENT
Start: 2024-04-16 | End: 2024-04-19

## 2024-04-16 RX ORDER — ALBUTEROL 90 UG/1
2 AEROSOL, METERED ORAL EVERY 6 HOURS
Refills: 0 | Status: DISCONTINUED | OUTPATIENT
Start: 2024-04-16 | End: 2024-04-19

## 2024-04-16 RX ORDER — ALBUTEROL 90 UG/1
2.5 AEROSOL, METERED ORAL EVERY 6 HOURS
Refills: 0 | Status: DISCONTINUED | OUTPATIENT
Start: 2024-04-16 | End: 2024-04-16

## 2024-04-16 RX ORDER — ENOXAPARIN SODIUM 100 MG/ML
40 INJECTION SUBCUTANEOUS EVERY 24 HOURS
Refills: 0 | Status: DISCONTINUED | OUTPATIENT
Start: 2024-04-16 | End: 2024-04-19

## 2024-04-16 RX ORDER — ACETAMINOPHEN 500 MG
975 TABLET ORAL EVERY 8 HOURS
Refills: 0 | Status: DISCONTINUED | OUTPATIENT
Start: 2024-04-17 | End: 2024-04-19

## 2024-04-16 RX ORDER — HYDROXYCHLOROQUINE SULFATE 200 MG
200 TABLET ORAL
Refills: 0 | Status: DISCONTINUED | OUTPATIENT
Start: 2024-04-16 | End: 2024-04-19

## 2024-04-16 RX ADMIN — SODIUM CHLORIDE 75 MILLILITER(S): 9 INJECTION, SOLUTION INTRAVENOUS at 01:13

## 2024-04-16 RX ADMIN — VALSARTAN 160 MILLIGRAM(S): 80 TABLET ORAL at 11:43

## 2024-04-16 RX ADMIN — Medication 400 MILLIGRAM(S): at 21:56

## 2024-04-16 RX ADMIN — METHOCARBAMOL 500 MILLIGRAM(S): 500 TABLET, FILM COATED ORAL at 14:46

## 2024-04-16 RX ADMIN — DULOXETINE HYDROCHLORIDE 60 MILLIGRAM(S): 30 CAPSULE, DELAYED RELEASE ORAL at 14:12

## 2024-04-16 RX ADMIN — SENNA PLUS 2 TABLET(S): 8.6 TABLET ORAL at 21:57

## 2024-04-16 RX ADMIN — ENOXAPARIN SODIUM 40 MILLIGRAM(S): 100 INJECTION SUBCUTANEOUS at 21:57

## 2024-04-16 RX ADMIN — TRAMADOL HYDROCHLORIDE 50 MILLIGRAM(S): 50 TABLET ORAL at 22:03

## 2024-04-16 RX ADMIN — OXYCODONE HYDROCHLORIDE 5 MILLIGRAM(S): 5 TABLET ORAL at 22:03

## 2024-04-16 RX ADMIN — Medication 120 MILLIGRAM(S): at 21:57

## 2024-04-16 RX ADMIN — OXYCODONE HYDROCHLORIDE 5 MILLIGRAM(S): 5 TABLET ORAL at 01:59

## 2024-04-16 RX ADMIN — ONDANSETRON 4 MILLIGRAM(S): 8 TABLET, FILM COATED ORAL at 05:51

## 2024-04-16 RX ADMIN — Medication 50 MILLIGRAM(S): at 05:50

## 2024-04-16 RX ADMIN — TRAMADOL HYDROCHLORIDE 50 MILLIGRAM(S): 50 TABLET ORAL at 20:36

## 2024-04-16 RX ADMIN — DULOXETINE HYDROCHLORIDE 30 MILLIGRAM(S): 30 CAPSULE, DELAYED RELEASE ORAL at 14:12

## 2024-04-16 RX ADMIN — OXYCODONE HYDROCHLORIDE 5 MILLIGRAM(S): 5 TABLET ORAL at 02:45

## 2024-04-16 RX ADMIN — Medication 200 MILLIGRAM(S): at 18:45

## 2024-04-16 RX ADMIN — Medication 1000 MILLIGRAM(S): at 05:50

## 2024-04-16 RX ADMIN — TRAMADOL HYDROCHLORIDE 50 MILLIGRAM(S): 50 TABLET ORAL at 12:13

## 2024-04-16 RX ADMIN — HYDROMORPHONE HYDROCHLORIDE 0.5 MILLIGRAM(S): 2 INJECTION INTRAMUSCULAR; INTRAVENOUS; SUBCUTANEOUS at 06:20

## 2024-04-16 RX ADMIN — Medication 1000 MILLIGRAM(S): at 14:11

## 2024-04-16 RX ADMIN — ONDANSETRON 4 MILLIGRAM(S): 8 TABLET, FILM COATED ORAL at 00:33

## 2024-04-16 RX ADMIN — TRAMADOL HYDROCHLORIDE 50 MILLIGRAM(S): 50 TABLET ORAL at 11:13

## 2024-04-16 RX ADMIN — VALSARTAN 160 MILLIGRAM(S): 80 TABLET ORAL at 20:36

## 2024-04-16 RX ADMIN — PANTOPRAZOLE SODIUM 40 MILLIGRAM(S): 20 TABLET, DELAYED RELEASE ORAL at 07:02

## 2024-04-16 RX ADMIN — SODIUM CHLORIDE 75 MILLILITER(S): 9 INJECTION, SOLUTION INTRAVENOUS at 14:47

## 2024-04-16 RX ADMIN — POLYETHYLENE GLYCOL 3350 17 GRAM(S): 17 POWDER, FOR SOLUTION ORAL at 11:43

## 2024-04-16 RX ADMIN — METHOCARBAMOL 500 MILLIGRAM(S): 500 TABLET, FILM COATED ORAL at 21:57

## 2024-04-16 RX ADMIN — HYDROMORPHONE HYDROCHLORIDE 0.5 MILLIGRAM(S): 2 INJECTION INTRAMUSCULAR; INTRAVENOUS; SUBCUTANEOUS at 05:50

## 2024-04-16 RX ADMIN — METHOCARBAMOL 500 MILLIGRAM(S): 500 TABLET, FILM COATED ORAL at 05:50

## 2024-04-16 RX ADMIN — ATORVASTATIN CALCIUM 80 MILLIGRAM(S): 80 TABLET, FILM COATED ORAL at 21:57

## 2024-04-16 RX ADMIN — Medication 120 MILLIGRAM(S): at 14:11

## 2024-04-16 RX ADMIN — OXYCODONE HYDROCHLORIDE 5 MILLIGRAM(S): 5 TABLET ORAL at 22:45

## 2024-04-16 NOTE — DISCHARGE NOTE PROVIDER - HOSPITAL COURSE
Admitted 4/15/24  Surgery- T9-pelvis PSF  Rosa-op Antibiotics  Pain control  DVT prophylaxis- lovenox 40mg subq daily (started PO#1 evening), SCDs  OOB/Physical Therapy/ Occupational therapy    Inpatient Events:  4/15: OR complicated by dural tear. Patient placed on bedrest  4/16: Patient kept on bedrest for dural tear. Asymptomatic. Allowed HOB elevation. Drains kept off suction. LVX started in PM. Boggs kept for bedrest.   Admitted 4/15/24  Surgery- T9-pelvis PSF  Rosa-op Antibiotics  Pain control  DVT prophylaxis- lovenox 40mg subq daily (started PO#1 evening), SCDs  OOB/Physical Therapy/ Occupational therapy    Inpatient Events:  4/15: OR complicated by dural tear. Patient placed on bedrest  4/16: Patient kept on bedrest for dural tear. Asymptomatic. Allowed HOB elevation. Drains kept off suction. LVX started in PM. Peterson kept for bedrest.  4/17: Tolerated HOB elevation, PT/OT started. Removed peterson for TOV. Drains continued off suction. Admitted 4/15/24  Surgery- T9-pelvis PSF  Rosa-op Antibiotics  Pain control  DVT prophylaxis- lovenox 40mg subq daily (started PO#1 evening), SCDs  OOB/Physical Therapy/ Occupational therapy    Inpatient Events:  4/15: OR complicated by dural tear. Patient placed on bedrest  4/16: Patient kept on bedrest for dural tear. Asymptomatic. Allowed HOB elevation. Drains kept off suction. LVX started in PM. Peterson kept for bedrest.  4/17: Tolerated HOB elevation, PT/OT started. Removed peterson for TOV- passed. Drains continued off suction.  4/18: Drains continued. Pending insurance authorization for rehab Admitted 4/15/24  Surgery- T9-pelvis PSF  Rosa-op Antibiotics  Pain control  DVT prophylaxis- lovenox 40mg subq daily (started PO#1 evening), SCDs  OOB/Physical Therapy/ Occupational therapy    Inpatient Events:  4/15: OR complicated by dural tear. Patient placed on bedrest  4/16: Patient kept on bedrest for dural tear. Asymptomatic. Allowed HOB elevation. Drains kept off suction. LVX started in PM. Peterson kept for bedrest.  4/17: Tolerated HOB elevation, PT/OT started. Removed peterson for TOV- passed. Drains continued off suction.  4/18: Drains continued. Pending insurance authorization for rehab  4/19: Suppository + enema given for no BM. Pending CAITLYN auth. Drains continued. Admitted 4/15/24  Surgery- T9-pelvis PSF  Rosa-op Antibiotics  Pain control  DVT prophylaxis- lovenox 40mg subq daily (started PO#1 evening), SCDs  OOB/Physical Therapy/ Occupational therapy    Inpatient Events:  4/15: OR complicated by dural tear. Patient placed on bedrest  4/16: Patient kept on bedrest for dural tear. Asymptomatic. Allowed HOB elevation. Drains kept off suction. LVX started in PM. Peterson kept for bedrest.  4/17: Tolerated HOB elevation, PT/OT started. Removed peterson for TOV- passed. Drains continued off suction.  4/18: Drains continued. Pending insurance authorization for rehab  4/19: Suppository given- had BM. Drains removed. Received CAITLYN gonzalez.

## 2024-04-16 NOTE — PROGRESS NOTE ADULT - SUBJECTIVE AND OBJECTIVE BOX
Ortho Progress Note    Procedure:  T9-Pelvis PSF, c/b intraop Dural Tear  Surgeon: Dr. TONO Salguero    Pt comfortable without complaints, pain controlled. Denies headaches. Antsy and wants to get out of bed.  Denies CP, SOB, N/V, numbness/tingling     Vital Signs Last 24 Hrs  T(C): 36.8 (04-16-24 @ 05:44), Max: 36.8 (04-16-24 @ 05:44)  T(F): 98.2 (04-16-24 @ 05:44), Max: 98.2 (04-16-24 @ 05:44)  HR: 93 (04-16-24 @ 05:44) (81 - 93)  BP: 158/81 (04-16-24 @ 05:44) (133/62 - 158/81)  BP(mean): --  RR: 16 (04-16-24 @ 05:44) (16 - 16)  SpO2: 99% (04-16-24 @ 05:44) (95% - 99%)    General: Pt Alert and oriented, NAD  Drains: HVx2  Pulses: 2+ DP  Sensation: SILT grossly L2-S1 bilaterally  Motor: 5/5 L2-S1 bilaterally    A/P: 83yFemale s/p T9-Pelvis PSF c/b intraop dural tear by Dr. TONO Salguero on 04-15  - Stable  - Pain Control  - DVT ppx: SCDs  - Post op abx: Ancef  - Drains: HVx2  - HOB flat for now due to dural tear, possibly advance slowly this AM if no headaches  - Keep peterson in until mobilizes with PT

## 2024-04-16 NOTE — DISCHARGE NOTE PROVIDER - NSDCCPCAREPLAN_GEN_ALL_CORE_FT
PRINCIPAL DISCHARGE DIAGNOSIS  Diagnosis: Thoracic stenosis  Assessment and Plan of Treatment: T9-pelvis PSF      SECONDARY DISCHARGE DIAGNOSES  Diagnosis: Lumbar stenosis  Assessment and Plan of Treatment: T9-pelvis PSF

## 2024-04-16 NOTE — DISCHARGE NOTE PROVIDER - NSDCCPTREATMENT_GEN_ALL_CORE_FT
PRINCIPAL PROCEDURE  Procedure: Fusion, spine, thoracic, posterior approach  Findings and Treatment: T9-pelvis PSF      SECONDARY PROCEDURE  Procedure: Fusion of posterior lumbar spine  Findings and Treatment: T9-pelvis PSF

## 2024-04-16 NOTE — CONSULT NOTE ADULT - SUBJECTIVE AND OBJECTIVE BOX
HPI "84yo female with acute on chronic back pain, worsening in the last 3 weeks without inciting accident or injury. Patient reports her back pain has been unbearable in the last couple weeks where she wakes up in 10/10 stabbing pain. Her pain radiates down both legs with the left being worse than the right. She states she has the most difficulty with laying to sitting/standing transfers. She reports she had an episode a couple of weeks ago where both of her legs went numb. She then went to sleep and states her sensation had returned by morning. She has had 5 prior back surgeries that took place between 0560-2046. She has been taking Tramadol and Tylenol at home for pain with  minimal relief. Pain persists despite conservative measures. Ambulates with the assistance of a cane. Of note, she reports she currently has two rotator cuff tears.   Denies history of blood clots/seizures. Denies CP/SOB/N/V.     Presents today for elective T9-PELVIS, WINIFRED, exploration of fusion, laminectomy, PSF, TLIF.  (12 Apr 2024 13:25)"    83F w CKD3, HTN, CAD, Asthma, Anxiety, prior breast CA, w acute on chronic back pain radiating down L>RLE, here for T9-Pelvis PSF w Dr. Salguero 4/15    #Post-op state - pain __. PPx: SQL. On bowel regimen and incentive spirometer  #Lumbar radiculopathy   - Tylenol 1g q8, Robaxin 500 q8, Lyrica 50 q8   - PRN: Tramadol 50 q4 for mod pain; oxycodone 5mg q4 for severe pain    Hydroxychloroquin 200 BID.   #Leukocytosis - 12  #Acute blood loss anemia - hgb 9.7. Baseline 10.3.  #HTN - on valsartan 160 BID, Diltiazem 120 BID  #CAD  #Asthma - on PRN albuterol    #Anxiety - on duloxetine 90mg daily  #CKD3 - baseline Cr 1.2          PAST MEDICAL & SURGICAL HISTORY:  Stage 3 chronic kidney disease      Anxiety      HTN (hypertension)      Asthma      CAD (coronary artery disease)      HLD (hyperlipidemia)      OA (osteoarthritis of spine)      Ductal carcinoma in situ (DCIS) of left breast  RIGHT LUMPECTOMY      History of cataract surgery  BILAT      H/O bilateral hip replacements      H/O knee surgery  BILATERAL REPLACEMENTS      H/O breast surgery  LEFT BIOPSY      H/O parathyroidectomy      H/O cervical spine surgery      H/O shoulder surgery  RIGHT ROTATOR CUFF      History of back surgery      History of back surgery  LOWER        Home Meds: Home Medications:  Albuterol (Eqv-ProAir HFA) 90 mcg/inh inhalation aerosol: 2 puff(s) inhaled as needed for PRN (15 Apr 2024 07:15)  aspirin 81 mg oral capsule: 1 cap(s) orally once a day (15 Apr 2024 07:15)  DilTIAZem (Eqv-Cardizem CD) 120 mg/24 hours oral capsule, extended release: 1 cap(s) orally 2 times a day (15 Apr 2024 07:15)  DULoxetine 30 mg oral delayed release capsule: 1 cap(s) orally once a day (15 Apr 2024 07:15)  DULoxetine 60 mg oral delayed release capsule: 1 cap(s) orally once a day (15 Apr 2024 07:15)  hydroxychloroquine 200 mg oral tablet: 1 tab(s) orally 2 times a day (15 Apr 2024 07:15)  Lipitor 80 mg oral tablet: 1 tab(s) orally once a day (15 Apr 2024 07:15)  Naprosyn 500 mg oral tablet: 1 tab(s) orally as needed for PRN (15 Apr 2024 07:15)  traMADol 50 mg oral tablet: 1 tab(s) orally as needed for PRN (15 Apr 2024 07:15)  valsartan 160 mg oral tablet: 1 tab(s) orally 2 times a day (15 Apr 2024 07:15)    Allergies: Allergies    penicillin (Unknown)  venlafaxine (Unknown)    Intolerances      Soc:   Advanced Directives: Presumed Full Code     CURRENT MEDICATIONS:   --------------------------------------------------------------------------------------  Neurologic Medications  acetaminophen     Tablet .. 1000 milliGRAM(s) Oral every 8 hours  acetaminophen   IVPB .. 1000 milliGRAM(s) IV Intermittent once  DULoxetine 60 milliGRAM(s) Oral daily  DULoxetine 30 milliGRAM(s) Oral daily  HYDROmorphone  Injectable 0.5 milliGRAM(s) IV Push every 4 hours PRN For breakthrough pain  methocarbamol 500 milliGRAM(s) Oral every 8 hours  ondansetron   Disintegrating Tablet 4 milliGRAM(s) Oral every 6 hours  oxyCODONE    IR 5 milliGRAM(s) Oral every 4 hours PRN Severe Pain (7 - 10)  pregabalin 50 milliGRAM(s) Oral three times a day  traMADol 50 milliGRAM(s) Oral every 4 hours PRN Moderate Pain (4 - 6)    Respiratory Medications  albuterol    90 MICROgram(s) HFA Inhaler 2 Puff(s) Inhalation every 6 hours PRN Shortness of Breath and/or Wheezing    Cardiovascular Medications  diltiazem    milliGRAM(s) Oral <User Schedule>  valsartan 160 milliGRAM(s) Oral every 12 hours    Gastrointestinal Medications  lactated ringers. 1000 milliLiter(s) IV Continuous <Continuous>  pantoprazole    Tablet 40 milliGRAM(s) Oral before breakfast  polyethylene glycol 3350 17 Gram(s) Oral daily  senna 2 Tablet(s) Oral at bedtime    Genitourinary Medications    Hematologic/Oncologic Medications  enoxaparin Injectable 40 milliGRAM(s) SubCutaneous every 24 hours    Antimicrobial/Immunologic Medications  hydroxychloroquine 200 milliGRAM(s) Oral two times a day    Endocrine/Metabolic Medications  atorvastatin 80 milliGRAM(s) Oral at bedtime    Topical/Other Medications    --------------------------------------------------------------------------------------    VITAL SIGNS, INS/OUTS (last 24 hours):  --------------------------------------------------------------------------------------  ICU Vital Signs Last 24 Hrs  T(C): 36.6 (16 Apr 2024 09:53), Max: 37.7 (15 Apr 2024 13:20)  T(F): 97.9 (16 Apr 2024 09:53), Max: 99.8 (15 Apr 2024 13:20)  HR: 88 (16 Apr 2024 09:53) (68 - 93)  BP: 127/58 (16 Apr 2024 09:53) (117/59 - 158/81)  BP(mean): 87 (15 Apr 2024 17:28) (80 - 97)  ABP: 154/66 (15 Apr 2024 16:45) (138/60 - 168/64)  ABP(mean): 96 (15 Apr 2024 16:45) (0 - 122)  RR: 18 (16 Apr 2024 09:53) (10 - 20)  SpO2: 99% (16 Apr 2024 09:53) (94% - 99%)    O2 Parameters below as of 16 Apr 2024 09:53  Patient On (Oxygen Delivery Method): nasal cannula  O2 Flow (L/min): 2        I&O's Summary    15 Apr 2024 07:01  -  16 Apr 2024 07:00  --------------------------------------------------------  IN: 1630 mL / OUT: 2745 mL / NET: -1115 mL    16 Apr 2024 07:01  -  16 Apr 2024 10:02  --------------------------------------------------------  IN: 75 mL / OUT: 0 mL / NET: 75 mL      --------------------------------------------------------------------------------------    EXAM:    LABS  --------------------------------------------------------------------------------------  Labs:  CAPILLARY BLOOD GLUCOSE                              9.7    11.97 )-----------( 155      ( 16 Apr 2024 05:30 )             29.6       Auto Neutrophil %: 87.9 % (04-16-24 @ 05:30)  Auto Immature Granulocyte %: 0.7 % (04-16-24 @ 05:30)    04-16    137  |  107  |  18  ----------------------------<  125<H>  5.0   |  21<L>  |  0.87      Calcium: 8.8 mg/dL (04-16-24 @ 05:30)      LFTs:       ABG - ( 15 Apr 2024 10:20 )  pH: 7.39  /  pCO2: 35    /  pO2: 224   / HCO3: 21    / Base Excess: -3.3  /  SaO2: x                 Coags:            Urinalysis Basic - ( 16 Apr 2024 05:30 )    Color: x / Appearance: x / SG: x / pH: x  Gluc: 125 mg/dL / Ketone: x  / Bili: x / Urobili: x   Blood: x / Protein: x / Nitrite: x   Leuk Esterase: x / RBC: x / WBC x   Sq Epi: x / Non Sq Epi: x / Bacteria: x          --------------------------------------------------------------------------------------    OTHER LABS    IMAGING RESULTS  ****************     HPI "84yo female with acute on chronic back pain, worsening in the last 3 weeks without inciting accident or injury. Patient reports her back pain has been unbearable in the last couple weeks where she wakes up in 10/10 stabbing pain. Her pain radiates down both legs with the left being worse than the right. She states she has the most difficulty with laying to sitting/standing transfers. She reports she had an episode a couple of weeks ago where both of her legs went numb. She then went to sleep and states her sensation had returned by morning. She has had 5 prior back surgeries that took place between 6717-7564. She has been taking Tramadol and Tylenol at home for pain with  minimal relief. Pain persists despite conservative measures. Ambulates with the assistance of a cane. Of note, she reports she currently has two rotator cuff tears.   Denies history of blood clots/seizures. Denies CP/SOB/N/V.     Presents today for elective T9-PELVIS, WINIFRED, exploration of fusion, laminectomy, PSF, TLIF.  (12 Apr 2024 13:25)"    83F w CKD3, HTN, CAD, Asthma, Anxiety, prior breast CA, w acute on chronic back pain radiating down L>RLE, here for T9-Pelvis PSF w Dr. Salguero 4/15    Pt reports pain in back is controlled. Denies any headache, nausea, LH/dizziness. Eating wo N/V/abd pain. +Flatus wo BM. Boggs remains in place.     ROS: 12 point ROS reviewed and otherwise negative per HPI  FH: No DVT/PE   SH: Non smoker        PAST MEDICAL & SURGICAL HISTORY:  Stage 3 chronic kidney disease      Anxiety      HTN (hypertension)      Asthma      CAD (coronary artery disease)      HLD (hyperlipidemia)      OA (osteoarthritis of spine)      Ductal carcinoma in situ (DCIS) of left breast  RIGHT LUMPECTOMY      History of cataract surgery  BILAT      H/O bilateral hip replacements      H/O knee surgery  BILATERAL REPLACEMENTS      H/O breast surgery  LEFT BIOPSY      H/O parathyroidectomy      H/O cervical spine surgery      H/O shoulder surgery  RIGHT ROTATOR CUFF      History of back surgery      History of back surgery  LOWER        Home Meds: Home Medications:  Albuterol (Eqv-ProAir HFA) 90 mcg/inh inhalation aerosol: 2 puff(s) inhaled as needed for PRN (15 Apr 2024 07:15)  aspirin 81 mg oral capsule: 1 cap(s) orally once a day (15 Apr 2024 07:15)  DilTIAZem (Eqv-Cardizem CD) 120 mg/24 hours oral capsule, extended release: 1 cap(s) orally 2 times a day (15 Apr 2024 07:15)  DULoxetine 30 mg oral delayed release capsule: 1 cap(s) orally once a day (15 Apr 2024 07:15)  DULoxetine 60 mg oral delayed release capsule: 1 cap(s) orally once a day (15 Apr 2024 07:15)  hydroxychloroquine 200 mg oral tablet: 1 tab(s) orally 2 times a day (15 Apr 2024 07:15)  Lipitor 80 mg oral tablet: 1 tab(s) orally once a day (15 Apr 2024 07:15)  Naprosyn 500 mg oral tablet: 1 tab(s) orally as needed for PRN (15 Apr 2024 07:15)  traMADol 50 mg oral tablet: 1 tab(s) orally as needed for PRN (15 Apr 2024 07:15)  valsartan 160 mg oral tablet: 1 tab(s) orally 2 times a day (15 Apr 2024 07:15)    Allergies: Allergies    penicillin (Unknown)  venlafaxine (Unknown)    Intolerances      Soc:   Advanced Directives: Presumed Full Code     CURRENT MEDICATIONS:   --------------------------------------------------------------------------------------  Neurologic Medications  acetaminophen     Tablet .. 1000 milliGRAM(s) Oral every 8 hours  acetaminophen   IVPB .. 1000 milliGRAM(s) IV Intermittent once  DULoxetine 60 milliGRAM(s) Oral daily  DULoxetine 30 milliGRAM(s) Oral daily  HYDROmorphone  Injectable 0.5 milliGRAM(s) IV Push every 4 hours PRN For breakthrough pain  methocarbamol 500 milliGRAM(s) Oral every 8 hours  ondansetron   Disintegrating Tablet 4 milliGRAM(s) Oral every 6 hours  oxyCODONE    IR 5 milliGRAM(s) Oral every 4 hours PRN Severe Pain (7 - 10)  pregabalin 50 milliGRAM(s) Oral three times a day  traMADol 50 milliGRAM(s) Oral every 4 hours PRN Moderate Pain (4 - 6)    Respiratory Medications  albuterol    90 MICROgram(s) HFA Inhaler 2 Puff(s) Inhalation every 6 hours PRN Shortness of Breath and/or Wheezing    Cardiovascular Medications  diltiazem    milliGRAM(s) Oral <User Schedule>  valsartan 160 milliGRAM(s) Oral every 12 hours    Gastrointestinal Medications  lactated ringers. 1000 milliLiter(s) IV Continuous <Continuous>  pantoprazole    Tablet 40 milliGRAM(s) Oral before breakfast  polyethylene glycol 3350 17 Gram(s) Oral daily  senna 2 Tablet(s) Oral at bedtime    Genitourinary Medications    Hematologic/Oncologic Medications  enoxaparin Injectable 40 milliGRAM(s) SubCutaneous every 24 hours    Antimicrobial/Immunologic Medications  hydroxychloroquine 200 milliGRAM(s) Oral two times a day    Endocrine/Metabolic Medications  atorvastatin 80 milliGRAM(s) Oral at bedtime    Topical/Other Medications    --------------------------------------------------------------------------------------    VITAL SIGNS, INS/OUTS (last 24 hours):  --------------------------------------------------------------------------------------  ICU Vital Signs Last 24 Hrs  T(C): 36.6 (16 Apr 2024 09:53), Max: 37.7 (15 Apr 2024 13:20)  T(F): 97.9 (16 Apr 2024 09:53), Max: 99.8 (15 Apr 2024 13:20)  HR: 88 (16 Apr 2024 09:53) (68 - 93)  BP: 127/58 (16 Apr 2024 09:53) (117/59 - 158/81)  BP(mean): 87 (15 Apr 2024 17:28) (80 - 97)  ABP: 154/66 (15 Apr 2024 16:45) (138/60 - 168/64)  ABP(mean): 96 (15 Apr 2024 16:45) (0 - 122)  RR: 18 (16 Apr 2024 09:53) (10 - 20)  SpO2: 99% (16 Apr 2024 09:53) (94% - 99%)    O2 Parameters below as of 16 Apr 2024 09:53  Patient On (Oxygen Delivery Method): nasal cannula  O2 Flow (L/min): 2        I&O's Summary    15 Apr 2024 07:01  -  16 Apr 2024 07:00  --------------------------------------------------------  IN: 1630 mL / OUT: 2745 mL / NET: -1115 mL    16 Apr 2024 07:01  -  16 Apr 2024 10:02  --------------------------------------------------------  IN: 75 mL / OUT: 0 mL / NET: 75 mL      --------------------------------------------------------------------------------------    EXAM:  GEN: female in NAD on RA  HEENT: NC/AT, MMM  CV: RRR, nml S1S2, no murmurs  PULM: nml effort, CTAB  ABD: Soft, non-distended, NABS, non-tender  NEURO  A/O x3, moving all extremities, Sensation intact. Lumbar dressing c/d/i  5/5 in b/l plantarflex/ext  PSYCH: Appropriate    LABS  --------------------------------------------------------------------------------------  Labs:  CAPILLARY BLOOD GLUCOSE                              9.7    11.97 )-----------( 155      ( 16 Apr 2024 05:30 )             29.6       Auto Neutrophil %: 87.9 % (04-16-24 @ 05:30)  Auto Immature Granulocyte %: 0.7 % (04-16-24 @ 05:30)    04-16    137  |  107  |  18  ----------------------------<  125<H>  5.0   |  21<L>  |  0.87      Calcium: 8.8 mg/dL (04-16-24 @ 05:30)      LFTs:       ABG - ( 15 Apr 2024 10:20 )  pH: 7.39  /  pCO2: 35    /  pO2: 224   / HCO3: 21    / Base Excess: -3.3  /  SaO2: x                 Coags:            Urinalysis Basic - ( 16 Apr 2024 05:30 )    Color: x / Appearance: x / SG: x / pH: x  Gluc: 125 mg/dL / Ketone: x  / Bili: x / Urobili: x   Blood: x / Protein: x / Nitrite: x   Leuk Esterase: x / RBC: x / WBC x   Sq Epi: x / Non Sq Epi: x / Bacteria: x          --------------------------------------------------------------------------------------    OTHER LABS    IMAGING RESULTS  ****************

## 2024-04-16 NOTE — DISCHARGE NOTE PROVIDER - NSDCFUSCHEDAPPT_GEN_ALL_CORE_FT
[facility]  ORTHOSURG [address] [facility]  Scheduled Appointment: 04/30/2024

## 2024-04-16 NOTE — CONSULT NOTE ADULT - ASSESSMENT
83F w CKD3, HTN, CAD, Asthma, Anxiety, prior breast CA, w acute on chronic back pain radiating down L>RLE, here for T9-Pelvis PSF w Dr. Salguero 4/15, complicated by dural tear    #Post-op state - pain __. PPx: SQL. On bowel regimen and incentive spirometer  #Lumbar radiculopathy   - Tylenol 1g q8, Robaxin 500 q8, Lyrica 50 q8   - PRN: Tramadol 50 q4 for mod pain; oxycodone 5mg q4 for severe pain    Hydroxychloroquin 200 BID.   #Leukocytosis - 12  #Acute blood loss anemia - hgb 9.7. Baseline 10.3.  #HTN - on valsartan 160 BID, Diltiazem 120 BID  #CAD  #Asthma - on PRN albuterol    #Anxiety - on duloxetine 90mg daily  #CKD3 - baseline Cr 1.2    Plan  HOB per ortho  PT tomorrow w TOV once pt mobilized  CBC w diff, BMP in AM  Incomplete 83F w CKD3, HTN, CAD, Asthma, Anxiety, prior breast CA, w acute on chronic back pain radiating down L>RLE, here for T9-Pelvis PSF w Dr. Salguero 4/15, complicated by dural tear    #Post-op state - pain controlled. PPx: SQL. On bowel regimen and incentive spirometer  #Lumbar radiculopathy   - Tylenol 1g q8, Robaxin 500 q8, Lyrica 50 q8   - PRN: Tramadol 50 q4 for mod pain; oxycodone 5mg q4 for severe pain    #Erosive osteoarthritis - on home Hydroxychloroquine 200 BID.   #Leukocytosis - 12. Likely reactive as pt afebrile and non-toxic appearing  #Acute blood loss anemia - hgb 9.7. Baseline 10.3. Asymptomatic  #HTN - on valsartan 160 BID, Diltiazem 120 BID  #CAD - on aspirin daily  #Asthma - on PRN albuterol    #Anxiety - on duloxetine 90mg daily  #CKD3 - baseline Cr 1.2. 0.9 today    Plan  HOB per ortho  PT tomorrow w TOV once pt mobilized  CBC w diff, BMP in AM    DISPO: TBD pending PT  Above d/w orthopedics

## 2024-04-16 NOTE — DISCHARGE NOTE PROVIDER - CARE PROVIDER_API CALL
[facility]  Orthopaedic Surgery  [address]  [place] [address]  Phone: [phone]  Fax: [phone]  Follow Up Time: 2 weeks

## 2024-04-16 NOTE — PROGRESS NOTE ADULT - SUBJECTIVE AND OBJECTIVE BOX
Ortho Note    Pt seen and examined. Comfortable without complaints, pain controlled  Denies CP, SOB, numbness/tingling. Has been HOB flat overnight for dural tear in OR.  Denies headaches, blurry vision, light sensitivity, nausea/vomiting.     Vital Signs Last 24 Hrs  T(C): 36.6 (04-16-24 @ 09:53), Max: 36.8 (04-16-24 @ 05:44)  T(F): 97.9 (04-16-24 @ 09:53), Max: 98.2 (04-16-24 @ 05:44)  HR: 88 (04-16-24 @ 09:53) (88 - 93)  BP: 127/58 (04-16-24 @ 09:53) (127/58 - 158/81)  BP(mean): --  RR: 18 (04-16-24 @ 09:53) (16 - 18)  SpO2: 99% (04-16-24 @ 09:53) (99% - 99%)  AVSS    General: Pt Alert and oriented, NAD  DSG- gauze/ abd pads C/D/I, HV x 2 in place  Pulses: +2DP, WWP feet  Sensation: SILT BLE  Motor: 5/5 EHL/FHL/TA/GS BLE, hip/knee flexion intact bilaterally in bed                          9.7    11.97 )-----------( 155      ( 16 Apr 2024 05:30 )             29.6     04-16    137  |  107  |  18  ----------------------------<  125<H>  5.0   |  21<L>  |  0.87    Ca    8.8      16 Apr 2024 05:30        A/P: 83yFemale POD#1 s/p T9-pelvis PSF with course c/b dural tear  - VSS, Labs WNL- appreciate internal medicine co-management recommendations  - Pain Control  - DVT ppx: LVX 40mg subq daily to start tonight, SCDs  - WBS: WBAT when allowed OOB, hold PT for today  - dural tear- asymptomatic at present, plan for gradual HOB raise today and OOB tomorrow if still asymptomatic  - monitor HV x 2 output- continue drains off suction  - I/S, bowel regimen  - continue peterson until mobilizes  - dispo: pending PT/ OT eval (likely tomorrow if tolerates HOB elevation today)    Ortho Pager [phone] Please note patient never received cardiac monitor in the mail.  I do not want him to get billed for this, no need to repeat monitor at this time.

## 2024-04-16 NOTE — DISCHARGE NOTE PROVIDER - NSDCMRMEDTOKEN_GEN_ALL_CORE_FT
acetaminophen 10 mg/mL intravenous solution: 100 milliliter(s) intravenous once  acetaminophen 500 mg oral tablet: 2 tab(s) orally every 8 hours  Albuterol (Eqv-ProAir HFA) 90 mcg/inh inhalation aerosol: 2 puff(s) inhaled as needed for PRN  aspirin 81 mg oral capsule: 1 cap(s) orally once a day  DilTIAZem (Eqv-Cardizem CD) 120 mg/24 hours oral capsule, extended release: 1 cap(s) orally 2 times a day  DULoxetine 30 mg oral delayed release capsule: 1 cap(s) orally once a day  DULoxetine 60 mg oral delayed release capsule: 1 cap(s) orally once a day  hydroxychloroquine 200 mg oral tablet: 1 tab(s) orally 2 times a day  Lipitor 80 mg oral tablet: 1 tab(s) orally once a day  Naprosyn 500 mg oral tablet: 1 tab(s) orally as needed for PRN  traMADol 50 mg oral tablet: 1 tab(s) orally as needed for PRN  valsartan 160 mg oral tablet: 1 tab(s) orally 2 times a day   acetaminophen 500 mg oral tablet: 2 tab(s) orally every 8 hours  Albuterol (Eqv-ProAir HFA) 90 mcg/inh inhalation aerosol: 2 puff(s) inhaled as needed for PRN  aspirin 81 mg oral capsule: 1 cap(s) orally once a day  bisacodyl 10 mg rectal suppository: 1 suppository(ies) rectal once a day As needed Constipation  DilTIAZem (Eqv-Cardizem CD) 120 mg/24 hours oral capsule, extended release: 1 cap(s) orally 2 times a day  DULoxetine 30 mg oral delayed release capsule: 1 cap(s) orally once a day  DULoxetine 60 mg oral delayed release capsule: 1 cap(s) orally once a day  hydroxychloroquine 200 mg oral tablet: 1 tab(s) orally 2 times a day  Lipitor 80 mg oral tablet: 1 tab(s) orally once a day  methocarbamol 500 mg oral tablet: 1 tab(s) orally every 8 hours  Naprosyn 500 mg oral tablet: 1 tab(s) orally as needed for PRN  pantoprazole 40 mg oral delayed release tablet: 1 tab(s) orally once a day (before a meal)  polyethylene glycol 3350 oral powder for reconstitution: 17 gram(s) orally once a day  senna leaf extract oral tablet: 2 tab(s) orally once a day (at bedtime)  traMADol 50 mg oral tablet: 1 tab(s) orally as needed for PRN  valsartan 160 mg oral tablet: 1 tab(s) orally 2 times a day   acetaminophen 500 mg oral tablet: 2 tab(s) orally every 8 hours  Albuterol (Eqv-ProAir HFA) 90 mcg/inh inhalation aerosol: 2 puff(s) inhaled every 6 hours as needed for PRN wheezing  aspirin 81 mg oral capsule: 1 cap(s) orally once a day can resume Saturday 4/20  bisacodyl 10 mg rectal suppository: 1 suppository(ies) rectal once a day As needed Constipation  DilTIAZem (Eqv-Cardizem CD) 120 mg/24 hours oral capsule, extended release: 1 cap(s) orally 2 times a day  DULoxetine 30 mg oral delayed release capsule: 1 cap(s) orally once a day  DULoxetine 60 mg oral delayed release capsule: 1 cap(s) orally once a day  enoxaparin 40 mg/0.4 mL injectable solution: 40 milligram(s) injectable once a day for DVT prophylaxis while at rehab.  hydroxychloroquine 200 mg oral tablet: 1 tab(s) orally 2 times a day  Lipitor 80 mg oral tablet: 1 tab(s) orally once a day  methocarbamol 500 mg oral tablet: 1 tab(s) orally every 8 hours  pantoprazole 40 mg oral delayed release tablet: 1 tab(s) orally once a day (before a meal)  polyethylene glycol 3350 oral powder for reconstitution: 17 gram(s) orally once a day  senna leaf extract oral tablet: 2 tab(s) orally once a day (at bedtime)  valsartan 160 mg oral tablet: 1 tab(s) orally 2 times a day

## 2024-04-16 NOTE — DISCHARGE NOTE PROVIDER - NSDCFUADDINST_GEN_ALL_CORE_FT
ACTIVITY:     - No extreme bending, extending, turning, twisting, or straining. No strenuous activity, heavy lifting, driving or returning to work until cleared by your surgeon.           DRESSING/SHOWERING:      (PRINEO – mesh with glue)     -May shower post-op day 1, pat dry afterwards. Dressing is water-resistant. Do not soak in bathtubs. Do not need to cover with another dressing. Do not remove mesh dressing – it will be taken care of in your follow up appointment. Do not apply ointments, creams or oils to incision area.     (STAPLES/SUTURES/STERI-STRIPS)     -Change dressing daily until post-op day 5. Sponge bathe until post-op day 5 then may take full shower. Once dressing removed keep incision clean and dry. Do not pick at your incision. Do not apply creams, ointments or oils to your incision until cleared by your surgeon. Do not soak your incision in sitting water (ie tubs, pools, lakes, etc.) until cleared by your surgeon.            MEDICATION/ANTICOAGULATION:     - You have been prescribed medications for pain:       - Tylenol (Acetaminophen) for mild to moderate pain. Do not exceed 3,000mg daily.       - For more severe pain, you may continue to take the Tylenol with the addition of narcotic pain medication. Take this medication as prescribed. Do not take more than prescribed. Note that this medication may cause drowsiness or dizziness. Do not operate machinery. This medication may cause constipation.     - If you have been prescribed a muscle relaxer, take this medication as needed for muscle spasm. Follow instructions on bottle.     - Try to have regular bowel movements. Take stool softener or laxative if necessary. You may wish to take Miralax daily until you have regular bowel movements.      - Do not take antiinflammatories (Aleve, Advil, Naproxen, Ibuprofen, etc.) until cleared by your surgeon. Tylenol is not an anti-inflammatory and okay to take (see above).     - If you have a pain management physician, please follow-up with them postoperatively.      - If you experience any negative side effects of your medications, please call your surgeon's office to discuss.           FOLLOW UP:     - Call to schedule an appt with Dr. Salguero for follow up.      - Please follow-up with your primary care physician or any other specialist you see postoperatively, if needed.      - Contact your doctor or go to the emergency room if you experience: fever greater than 101.5, chills, chest pain, difficulty breathing, redness or excessive drainage around the incision, other concerns.   ACTIVITY:     - No extreme bending, extending, turning, twisting, or straining. No strenuous activity, heavy lifting, driving or returning to work until cleared by your surgeon.           DRESSING/SHOWERING:     (STAPLES/SUTURES/STERI-STRIPS)     -Change dressing daily until post-op day 7. After post-op day 7, you may leave incision open to air. If you have steri-strips underneath your gauze dressing, DO NOT REMOVE. They will fall off on their own in about 2 weeks. Once dressing removed keep incision clean and dry. Do not pick at your incision. Do not apply creams, ointments or oils to your incision until cleared by your surgeon. Do not soak your incision in sitting water (ie tubs, pools, lakes, etc.) until cleared by your surgeon.            MEDICATION/ANTICOAGULATION:     - You have been prescribed medications for pain:       - Tylenol (Acetaminophen) for mild to moderate pain. Do not exceed 3,000mg daily.       - For more severe pain, you may continue to take the Tylenol with the addition of narcotic pain medication. Take this medication as prescribed. Do not take more than prescribed. Note that this medication may cause drowsiness or dizziness. Do not operate machinery. This medication may cause constipation.     - If you have been prescribed a muscle relaxer, take this medication as needed for muscle spasm. Follow instructions on bottle.     - Try to have regular bowel movements. Take stool softener or laxative if necessary. You may wish to take Miralax daily until you have regular bowel movements.      - Do not take antiinflammatories (Aleve, Advil, Naproxen, Ibuprofen, etc.) until cleared by your surgeon. Tylenol is not an anti-inflammatory and okay to take (see above).     - If you have a pain management physician, please follow-up with them postoperatively.      - If you experience any negative side effects of your medications, please call your surgeon's office to discuss.           FOLLOW UP:     - Call to schedule an appt with Dr. Salguero for follow up.      - Please follow-up with your primary care physician or any other specialist you see postoperatively, if needed.      - Contact your doctor or go to the emergency room if you experience: fever greater than 101.5, chills, chest pain, difficulty breathing, redness or excessive drainage around the incision, other concerns.   ACTIVITY:     - No extreme bending, extending, turning, twisting, or straining. No strenuous activity, heavy lifting, driving or returning to work until cleared by your surgeon.           DRESSING/SHOWERING:   You have gauze over your drain sites. Change dressings as needed until drain site heals (scabs over). Then you may leave open to air.  Keep steri strips clean/ dry/ and intact. DO NOT REMOVE. They will fall off on their own in about 2 weeks. Once dressing removed keep incision clean and dry. Do not pick at your incision. Do not apply creams, ointments or oils to your incision until cleared by your surgeon. Do not soak your incision in sitting water (ie tubs, pools, lakes, etc.) until cleared by your surgeon.            MEDICATION/ANTICOAGULATION:   Resume your home dose of aspirin 81mg daily 5 days after surgery (Saturday 4/20).    - You have been prescribed medications for pain:       - Tylenol (Acetaminophen) for mild to moderate pain. Do not exceed 3,000mg daily.       - For more severe pain, you may continue to take the Tylenol with the addition of narcotic pain medication. Take this medication as prescribed. Do not take more than prescribed. Note that this medication may cause drowsiness or dizziness. Do not operate machinery. This medication may cause constipation.     - If you have been prescribed a muscle relaxer, take this medication as needed for muscle spasm. Follow instructions on bottle.     - Try to have regular bowel movements. Take stool softener or laxative if necessary. You may wish to take Miralax daily until you have regular bowel movements.      - Do not take antiinflammatories (Aleve, Advil, Naproxen, Ibuprofen, etc.) until cleared by your surgeon. Tylenol is not an anti-inflammatory and okay to take (see above).     - If you have a pain management physician, please follow-up with them postoperatively.      - If you experience any negative side effects of your medications, please call your surgeon's office to discuss.           FOLLOW UP:     - Call to schedule an appt with Dr. Salguero for follow up.      - Please follow-up with your primary care physician or any other specialist you see postoperatively, if needed.      - Contact your doctor or go to the emergency room if you experience: fever greater than 101.5, chills, chest pain, difficulty breathing, redness or excessive drainage around the incision, other concerns.

## 2024-04-17 LAB
ANION GAP SERPL CALC-SCNC: 7 MMOL/L — SIGNIFICANT CHANGE UP (ref 5–17)
BASOPHILS # BLD AUTO: 0.02 K/UL — SIGNIFICANT CHANGE UP (ref 0–0.2)
BASOPHILS NFR BLD AUTO: 0.2 % — SIGNIFICANT CHANGE UP (ref 0–2)
BUN SERPL-MCNC: 19 MG/DL — SIGNIFICANT CHANGE UP (ref 7–23)
CALCIUM SERPL-MCNC: 8.8 MG/DL — SIGNIFICANT CHANGE UP (ref 8.4–10.5)
CHLORIDE SERPL-SCNC: 104 MMOL/L — SIGNIFICANT CHANGE UP (ref 96–108)
CO2 SERPL-SCNC: 26 MMOL/L — SIGNIFICANT CHANGE UP (ref 22–31)
CREAT SERPL-MCNC: 0.99 MG/DL — SIGNIFICANT CHANGE UP (ref 0.5–1.3)
EGFR: 57 ML/MIN/1.73M2 — LOW
EOSINOPHIL # BLD AUTO: 0.08 K/UL — SIGNIFICANT CHANGE UP (ref 0–0.5)
EOSINOPHIL NFR BLD AUTO: 0.9 % — SIGNIFICANT CHANGE UP (ref 0–6)
GLUCOSE SERPL-MCNC: 98 MG/DL — SIGNIFICANT CHANGE UP (ref 70–99)
HCT VFR BLD CALC: 28.2 % — LOW (ref 34.5–45)
HGB BLD-MCNC: 9.1 G/DL — LOW (ref 11.5–15.5)
IMM GRANULOCYTES NFR BLD AUTO: 1.2 % — HIGH (ref 0–0.9)
LYMPHOCYTES # BLD AUTO: 0.76 K/UL — LOW (ref 1–3.3)
LYMPHOCYTES # BLD AUTO: 8.5 % — LOW (ref 13–44)
MCHC RBC-ENTMCNC: 30.7 PG — SIGNIFICANT CHANGE UP (ref 27–34)
MCHC RBC-ENTMCNC: 32.3 GM/DL — SIGNIFICANT CHANGE UP (ref 32–36)
MCV RBC AUTO: 95.3 FL — SIGNIFICANT CHANGE UP (ref 80–100)
MONOCYTES # BLD AUTO: 0.99 K/UL — HIGH (ref 0–0.9)
MONOCYTES NFR BLD AUTO: 11.1 % — SIGNIFICANT CHANGE UP (ref 2–14)
NEUTROPHILS # BLD AUTO: 6.94 K/UL — SIGNIFICANT CHANGE UP (ref 1.8–7.4)
NEUTROPHILS NFR BLD AUTO: 78.1 % — HIGH (ref 43–77)
NRBC # BLD: 0 /100 WBCS — SIGNIFICANT CHANGE UP (ref 0–0)
PLATELET # BLD AUTO: 172 K/UL — SIGNIFICANT CHANGE UP (ref 150–400)
POTASSIUM SERPL-MCNC: 4.4 MMOL/L — SIGNIFICANT CHANGE UP (ref 3.5–5.3)
POTASSIUM SERPL-SCNC: 4.4 MMOL/L — SIGNIFICANT CHANGE UP (ref 3.5–5.3)
RBC # BLD: 2.96 M/UL — LOW (ref 3.8–5.2)
RBC # FLD: 14.1 % — SIGNIFICANT CHANGE UP (ref 10.3–14.5)
SODIUM SERPL-SCNC: 137 MMOL/L — SIGNIFICANT CHANGE UP (ref 135–145)
WBC # BLD: 8.9 K/UL — SIGNIFICANT CHANGE UP (ref 3.8–10.5)
WBC # FLD AUTO: 8.9 K/UL — SIGNIFICANT CHANGE UP (ref 3.8–10.5)

## 2024-04-17 PROCEDURE — 99233 SBSQ HOSP IP/OBS HIGH 50: CPT

## 2024-04-17 RX ORDER — VALSARTAN 80 MG/1
160 TABLET ORAL EVERY 12 HOURS
Refills: 0 | Status: DISCONTINUED | OUTPATIENT
Start: 2024-04-17 | End: 2024-04-19

## 2024-04-17 RX ORDER — TRAMADOL HYDROCHLORIDE 50 MG/1
50 TABLET ORAL EVERY 4 HOURS
Refills: 0 | Status: DISCONTINUED | OUTPATIENT
Start: 2024-04-17 | End: 2024-04-19

## 2024-04-17 RX ORDER — TRAMADOL HYDROCHLORIDE 50 MG/1
25 TABLET ORAL EVERY 4 HOURS
Refills: 0 | Status: DISCONTINUED | OUTPATIENT
Start: 2024-04-17 | End: 2024-04-19

## 2024-04-17 RX ADMIN — DULOXETINE HYDROCHLORIDE 60 MILLIGRAM(S): 30 CAPSULE, DELAYED RELEASE ORAL at 12:51

## 2024-04-17 RX ADMIN — OXYCODONE HYDROCHLORIDE 5 MILLIGRAM(S): 5 TABLET ORAL at 05:33

## 2024-04-17 RX ADMIN — PANTOPRAZOLE SODIUM 40 MILLIGRAM(S): 20 TABLET, DELAYED RELEASE ORAL at 05:05

## 2024-04-17 RX ADMIN — Medication 120 MILLIGRAM(S): at 22:01

## 2024-04-17 RX ADMIN — TRAMADOL HYDROCHLORIDE 50 MILLIGRAM(S): 50 TABLET ORAL at 21:20

## 2024-04-17 RX ADMIN — DULOXETINE HYDROCHLORIDE 30 MILLIGRAM(S): 30 CAPSULE, DELAYED RELEASE ORAL at 12:52

## 2024-04-17 RX ADMIN — TRAMADOL HYDROCHLORIDE 50 MILLIGRAM(S): 50 TABLET ORAL at 10:34

## 2024-04-17 RX ADMIN — Medication 975 MILLIGRAM(S): at 22:01

## 2024-04-17 RX ADMIN — Medication 975 MILLIGRAM(S): at 05:06

## 2024-04-17 RX ADMIN — VALSARTAN 160 MILLIGRAM(S): 80 TABLET ORAL at 22:01

## 2024-04-17 RX ADMIN — Medication 975 MILLIGRAM(S): at 14:10

## 2024-04-17 RX ADMIN — Medication 200 MILLIGRAM(S): at 05:05

## 2024-04-17 RX ADMIN — TRAMADOL HYDROCHLORIDE 50 MILLIGRAM(S): 50 TABLET ORAL at 20:37

## 2024-04-17 RX ADMIN — METHOCARBAMOL 500 MILLIGRAM(S): 500 TABLET, FILM COATED ORAL at 14:10

## 2024-04-17 RX ADMIN — TRAMADOL HYDROCHLORIDE 50 MILLIGRAM(S): 50 TABLET ORAL at 14:30

## 2024-04-17 RX ADMIN — TRAMADOL HYDROCHLORIDE 50 MILLIGRAM(S): 50 TABLET ORAL at 13:30

## 2024-04-17 RX ADMIN — OXYCODONE HYDROCHLORIDE 5 MILLIGRAM(S): 5 TABLET ORAL at 04:43

## 2024-04-17 RX ADMIN — ENOXAPARIN SODIUM 40 MILLIGRAM(S): 100 INJECTION SUBCUTANEOUS at 22:01

## 2024-04-17 RX ADMIN — Medication 200 MILLIGRAM(S): at 19:02

## 2024-04-17 RX ADMIN — ATORVASTATIN CALCIUM 80 MILLIGRAM(S): 80 TABLET, FILM COATED ORAL at 22:01

## 2024-04-17 RX ADMIN — SENNA PLUS 2 TABLET(S): 8.6 TABLET ORAL at 22:01

## 2024-04-17 RX ADMIN — TRAMADOL HYDROCHLORIDE 50 MILLIGRAM(S): 50 TABLET ORAL at 11:34

## 2024-04-17 RX ADMIN — METHOCARBAMOL 500 MILLIGRAM(S): 500 TABLET, FILM COATED ORAL at 22:01

## 2024-04-17 RX ADMIN — METHOCARBAMOL 500 MILLIGRAM(S): 500 TABLET, FILM COATED ORAL at 05:05

## 2024-04-17 RX ADMIN — POLYETHYLENE GLYCOL 3350 17 GRAM(S): 17 POWDER, FOR SOLUTION ORAL at 12:51

## 2024-04-17 NOTE — PROGRESS NOTE ADULT - ASSESSMENT
83F w CKD3, HTN, CAD, Asthma, Anxiety, prior breast CA, w acute on chronic back pain radiating down L>RLE, here for T9-Pelvis PSF w Dr. Salguero 4/15, complicated by dural tear    #Post-op state - pain controlled. PPx: SQL. On bowel regimen and incentive spirometer  #Lumbar radiculopathy   - Tylenol 1g q8, Robaxin 500 q8, Lyrica 50 q8   - PRN: Tramadol 25/50 q4 for mod/severe    #Erosive osteoarthritis - on home Hydroxychloroquine 200 BID.   #Leukocytosis - resolved.   #Acute blood loss anemia - hgb 9.1 from 9.7. Baseline 10.3. Asymptomatic  #HTN - on valsartan 160 BID, Diltiazem 120 BID  #CAD - on aspirin daily  #Asthma - on PRN albuterol    #Anxiety - on duloxetine 90mg daily  #CKD3 - baseline Cr 1.2. 0.99 today    Plan  Titrate off nasal canula - pt sat 94-96% on room air  Would put hold parameters on losartan - hold SBP<120.  TOV    CBC w diff, BMP in AM    DISPO: TBD pending PT  Above d/w orthopedics

## 2024-04-17 NOTE — OCCUPATIONAL THERAPY INITIAL EVALUATION ADULT - MODALITIES TREATMENT COMMENTS
Pt able to perform sit<->stand with Mod Ax1 using RW, and ambulated from bed to commode (~5ft), requiring Mod Ax1 using RW, demo decreased step length and B knee buckling, however able to self correct w/o LOB. Pt left seated on commode, +all lines, +call bell, NAD, care endorsed to KAVON Cano and PCA present at bedside with pt.

## 2024-04-17 NOTE — PHYSICAL THERAPY INITIAL EVALUATION ADULT - PERTINENT HX OF CURRENT PROBLEM, REHAB EVAL
84yo female with acute on chronic back pain, worsening in the last 3 weeks without inciting accident or injury. Patient reports her back pain has been unbearable in the last couple weeks where she wakes up in 10/10 stabbing pain. Her pain radiates down both legs with the left being worse than the right. She states she has the most difficulty with laying to sitting/standing transfers. She reports she had an episode a couple of weeks ago where both of her legs went numb. She then went to sleep and states her sensation had returned by morning. She has had 5 prior back surgeries that took place between 1563-8223.  Now s/p T9-pelvis laminectomy, PSF with proximal tether and cement at T9, c/b dural tear 4/15/24

## 2024-04-17 NOTE — PROGRESS NOTE ADULT - SUBJECTIVE AND OBJECTIVE BOX
Ortho Note    Pt seen and examined. Feels "great" today. Eating breakfast. Comfortable without complaints, pain controlled  Denies CP, SOB, numbness/tingling. Denies headache, vision changes, light sensitivity, nausea/vomiting    Vital Signs Last 24 Hrs  T(C): 36.6 (04-17-24 @ 09:59), Max: 36.6 (04-17-24 @ 09:59)  T(F): 97.9 (04-17-24 @ 09:59), Max: 97.9 (04-17-24 @ 09:59)  HR: 91 (04-17-24 @ 09:59) (91 - 91)  BP: 107/66 (04-17-24 @ 09:59) (107/66 - 107/66)  BP(mean): --  RR: --  SpO2: --  AVSS    General: Pt Alert and oriented, NAD  DSG- gauze/ abd pads C/D/I, HV x 2 in place  Pulses: +2DP, WWP feet  Sensation: SILT BLE  Motor: 5/5 EHL/FHL/TA/GS BLE, hip/knee flexion intact bilaterally in bed                        9.1    8.90  )-----------( 172      ( 17 Apr 2024 05:30 )             28.2     04-17    137  |  104  |  19  ----------------------------<  98  4.4   |  26  |  0.99    Ca    8.8      17 Apr 2024 05:30        A/P: 83yFemale POD#2 s/p T9-pelvis PSF with course c/b dural tear  - VSS, Labs WNL- appreciate internal medicine co-management recommendations  - Pain Control  - DVT ppx: LVX 40mg subq daily, SCDs  - PT, OT, WBS: WBAT  - dural tear- resolving, on bed rest since 4/16, tolerated HOB elevation yesterday, plan for OOB with PT/OT today  - monitor HV x 2 output- continue drains off suction  - I/S, bowel regimen  - remove peterson for TOV when ambulatory with PT/OT  - dispo: pending PT/ OT eval    Ortho Pager [phone]

## 2024-04-17 NOTE — PHYSICAL THERAPY INITIAL EVALUATION ADULT - GENERAL OBSERVATIONS, REHAB EVAL
Patient received semi-bethea in bed  in NAD on RA, +SCDs, +PIV, +Telemetry, +Hemovac x2 (to gravity), +Boggs. Cleared by KAVON Cano. Agreeable to PT.

## 2024-04-17 NOTE — PHYSICAL THERAPY INITIAL EVALUATION ADULT - DID THE PATIENT HAVE SURGERY?
83y Female s/p T9-pelvis laminectomy, PSF with proximal tether and cement at T9, c/b dural tear 4/15/24/yes

## 2024-04-17 NOTE — OCCUPATIONAL THERAPY INITIAL EVALUATION ADULT - ADDITIONAL COMMENTS
PTA, pt reporting that she was able to perform all ADLs independently, however with increasing difficulty 2/2 back pain. Pt reporting use of SC for ambulation, shower chair for bathing, and commode for toileting.

## 2024-04-17 NOTE — PROGRESS NOTE ADULT - SUBJECTIVE AND OBJECTIVE BOX
Ortho Progress Note    Procedure:  T9-Pelvis PSF, c/b intraop Dural Tear  Surgeon: Dr. TONO Salguero    Pt comfortable without complaints, pain controlled. Elevated HOB yesterday and Denies headaches.   Denies CP, SOB, N/V, numbness/tingling     Vital Signs Last 24 Hrs  T(C): 36.7 (17 Apr 2024 05:00), Max: 37.2 (16 Apr 2024 20:15)  T(F): 98.1 (17 Apr 2024 05:00), Max: 98.9 (16 Apr 2024 20:15)  HR: 90 (17 Apr 2024 04:31) (88 - 98)  BP: 130/89 (17 Apr 2024 04:31) (105/55 - 159/72)  BP(mean): --  RR: 18 (17 Apr 2024 05:00) (18 - 18)  SpO2: 99% (17 Apr 2024 05:00) (96% - 99%)    Parameters below as of 17 Apr 2024 05:00  Patient On (Oxygen Delivery Method): nasal cannula  O2 Flow (L/min): 2    General: Pt Alert and oriented, NAD  Drains: HVx2 to gravity  Pulses: 2+ DP  Sensation: SILT grossly L2-S1 bilaterally  Motor: 5/5 L2-S1 bilaterally    A/P: 83yFemale s/p T9-Pelvis PSF c/b intraop dural tear by Dr. TONO Salguero on 04-15  - Stable  - Pain Control  - DVT ppx: SCDs  - Post op abx: Ancef  - Drains: HVx2 to gravity  - Continue HOB elevation, Mobilize w/PT today  - Keep peterson in until mobilizes with PT

## 2024-04-17 NOTE — PHYSICAL THERAPY INITIAL EVALUATION ADULT - ADDITIONAL COMMENTS
Patient lives with spouse in private house with 2 steps to enter.  unable to assist with ADLs due to 'his owns vascular medical issues'. Ambulates with SC prior to surgery. Reports recent Hx of falls "I do have balance issues, that is what I go to PT for'. Owns shower chair and commode.

## 2024-04-17 NOTE — PROGRESS NOTE ADULT - SUBJECTIVE AND OBJECTIVE BOX
INTERVAL HPI/OVERNIGHT EVENTS: donte o/n    SUBJECTIVE: Patient seen and examined at bedside.   Pt felt dizzy when she got up w PT earlier today. No syncope. Denies chest pain, dyspnea. Feels pain is controlled. Eating wo N/V/Abd pain. +flatus wo BM. Boggs remained in place this AM - plan is for TOV    OBJECTIVE:    VITAL SIGNS:  ICU Vital Signs Last 24 Hrs  T(C): 36.6 (17 Apr 2024 09:59), Max: 37.2 (16 Apr 2024 20:15)  T(F): 97.9 (17 Apr 2024 09:59), Max: 98.9 (16 Apr 2024 20:15)  HR: 91 (17 Apr 2024 09:59) (90 - 98)  BP: 107/66 (17 Apr 2024 09:59) (107/66 - 159/72)  BP(mean): --  ABP: --  ABP(mean): --  RR: 18 (17 Apr 2024 05:00) (18 - 18)  SpO2: 99% (17 Apr 2024 05:00) (96% - 99%)    O2 Parameters below as of 17 Apr 2024 09:59  Patient On (Oxygen Delivery Method): nasal cannula  O2 Flow (L/min): 2            04-16 @ 07:01  -  04-17 @ 07:00  --------------------------------------------------------  IN: 1900 mL / OUT: 2345 mL / NET: -445 mL    04-17 @ 07:01  -  04-17 @ 15:30  --------------------------------------------------------  IN: 450 mL / OUT: 600 mL / NET: -150 mL      CAPILLARY BLOOD GLUCOSE          PHYSICAL EXAM:  GEN: female in NAD on RA  HEENT: NC/AT, MMM  CV: RRR, nml S1S2, no murmurs  PULM: nml effort, CTAB  ABD: Soft, non-distended, NABS, non-tender  NEURO  A/O x3, moving all extremities, Sensation intact. Lumbar dressing c/d/i  5/5 in b/l plantarflex/ext  PSYCH: Appropriate    MEDICATIONS:  MEDICATIONS  (STANDING):  acetaminophen     Tablet .. 975 milliGRAM(s) Oral every 8 hours  atorvastatin 80 milliGRAM(s) Oral at bedtime  diltiazem    milliGRAM(s) Oral <User Schedule>  DULoxetine 30 milliGRAM(s) Oral daily  DULoxetine 60 milliGRAM(s) Oral daily  enoxaparin Injectable 40 milliGRAM(s) SubCutaneous every 24 hours  hydroxychloroquine 200 milliGRAM(s) Oral two times a day  lactated ringers. 1000 milliLiter(s) (75 mL/Hr) IV Continuous <Continuous>  methocarbamol 500 milliGRAM(s) Oral every 8 hours  pantoprazole    Tablet 40 milliGRAM(s) Oral before breakfast  polyethylene glycol 3350 17 Gram(s) Oral daily  senna 2 Tablet(s) Oral at bedtime  valsartan 160 milliGRAM(s) Oral every 12 hours    MEDICATIONS  (PRN):  albuterol    90 MICROgram(s) HFA Inhaler 2 Puff(s) Inhalation every 6 hours PRN Shortness of Breath and/or Wheezing  bisacodyl Suppository 10 milliGRAM(s) Rectal daily PRN Constipation  magnesium hydroxide Suspension 30 milliLiter(s) Oral daily PRN Constipation  traMADol 50 milliGRAM(s) Oral every 4 hours PRN Severe Pain (7 - 10)  traMADol 25 milliGRAM(s) Oral every 4 hours PRN Moderate Pain (4 - 6)      ALLERGIES:  Allergies    penicillin (Unknown)  venlafaxine (Unknown)    Intolerances        LABS:                        9.1    8.90  )-----------( 172      ( 17 Apr 2024 05:30 )             28.2     04-17    137  |  104  |  19  ----------------------------<  98  4.4   |  26  |  0.99    Ca    8.8      17 Apr 2024 05:30        Urinalysis Basic - ( 17 Apr 2024 05:30 )    Color: x / Appearance: x / SG: x / pH: x  Gluc: 98 mg/dL / Ketone: x  / Bili: x / Urobili: x   Blood: x / Protein: x / Nitrite: x   Leuk Esterase: x / RBC: x / WBC x   Sq Epi: x / Non Sq Epi: x / Bacteria: x        RADIOLOGY & ADDITIONAL TESTS: Reviewed.

## 2024-04-17 NOTE — OCCUPATIONAL THERAPY INITIAL EVALUATION ADULT - PERTINENT HX OF CURRENT PROBLEM, REHAB EVAL
84yo female with acute on chronic back pain, worsening in the last 3 weeks without inciting accident or injury. Patient reports her back pain has been unbearable in the last couple weeks where she wakes up in 10/10 stabbing pain. Her pain radiates down both legs with the left being worse than the right. She states she has the most difficulty with laying to sitting/standing transfers. She reports she had an episode a couple of weeks ago where both of her legs went numb. She then went to sleep and states her sensation had returned by morning. She has had 5 prior back surgeries that took place between [id number].  Now s/p T9-pelvis laminectomy, PSF with proximal tether and cement at T9, c/b dural tear 4/15/24.

## 2024-04-17 NOTE — OCCUPATIONAL THERAPY INITIAL EVALUATION ADULT - LEVEL OF INDEPENDENCE: DRESS LOWER BODY, OT EVAL
don/doff B socks while sitting EOB in figure 4 position, requiring Mod Ax1 2/2 impaired dynamic sitting balance and decreased B hip flexibility./moderate assist (50% patients effort)

## 2024-04-18 LAB
ANION GAP SERPL CALC-SCNC: 9 MMOL/L — SIGNIFICANT CHANGE UP (ref 5–17)
BASOPHILS # BLD AUTO: 0.04 K/UL — SIGNIFICANT CHANGE UP (ref 0–0.2)
BASOPHILS NFR BLD AUTO: 0.5 % — SIGNIFICANT CHANGE UP (ref 0–2)
BLD GP AB SCN SERPL QL: NEGATIVE — SIGNIFICANT CHANGE UP
BUN SERPL-MCNC: 15 MG/DL — SIGNIFICANT CHANGE UP (ref 7–23)
CALCIUM SERPL-MCNC: 8.9 MG/DL — SIGNIFICANT CHANGE UP (ref 8.4–10.5)
CHLORIDE SERPL-SCNC: 106 MMOL/L — SIGNIFICANT CHANGE UP (ref 96–108)
CO2 SERPL-SCNC: 24 MMOL/L — SIGNIFICANT CHANGE UP (ref 22–31)
CREAT SERPL-MCNC: 0.89 MG/DL — SIGNIFICANT CHANGE UP (ref 0.5–1.3)
EGFR: 65 ML/MIN/1.73M2 — SIGNIFICANT CHANGE UP
EOSINOPHIL # BLD AUTO: 0.23 K/UL — SIGNIFICANT CHANGE UP (ref 0–0.5)
EOSINOPHIL NFR BLD AUTO: 2.9 % — SIGNIFICANT CHANGE UP (ref 0–6)
GLUCOSE SERPL-MCNC: 126 MG/DL — HIGH (ref 70–99)
HCT VFR BLD CALC: 26.8 % — LOW (ref 34.5–45)
HGB BLD-MCNC: 8.6 G/DL — LOW (ref 11.5–15.5)
IMM GRANULOCYTES NFR BLD AUTO: 1 % — HIGH (ref 0–0.9)
LYMPHOCYTES # BLD AUTO: 0.67 K/UL — LOW (ref 1–3.3)
LYMPHOCYTES # BLD AUTO: 8.4 % — LOW (ref 13–44)
MCHC RBC-ENTMCNC: 30.8 PG — SIGNIFICANT CHANGE UP (ref 27–34)
MCHC RBC-ENTMCNC: 32.1 GM/DL — SIGNIFICANT CHANGE UP (ref 32–36)
MCV RBC AUTO: 96.1 FL — SIGNIFICANT CHANGE UP (ref 80–100)
MONOCYTES # BLD AUTO: 0.85 K/UL — SIGNIFICANT CHANGE UP (ref 0–0.9)
MONOCYTES NFR BLD AUTO: 10.7 % — SIGNIFICANT CHANGE UP (ref 2–14)
NEUTROPHILS # BLD AUTO: 6.11 K/UL — SIGNIFICANT CHANGE UP (ref 1.8–7.4)
NEUTROPHILS NFR BLD AUTO: 76.5 % — SIGNIFICANT CHANGE UP (ref 43–77)
NRBC # BLD: 0 /100 WBCS — SIGNIFICANT CHANGE UP (ref 0–0)
PLATELET # BLD AUTO: 177 K/UL — SIGNIFICANT CHANGE UP (ref 150–400)
POTASSIUM SERPL-MCNC: 3.7 MMOL/L — SIGNIFICANT CHANGE UP (ref 3.5–5.3)
POTASSIUM SERPL-SCNC: 3.7 MMOL/L — SIGNIFICANT CHANGE UP (ref 3.5–5.3)
RBC # BLD: 2.79 M/UL — LOW (ref 3.8–5.2)
RBC # FLD: 13.7 % — SIGNIFICANT CHANGE UP (ref 10.3–14.5)
RH IG SCN BLD-IMP: POSITIVE — SIGNIFICANT CHANGE UP
SODIUM SERPL-SCNC: 139 MMOL/L — SIGNIFICANT CHANGE UP (ref 135–145)
WBC # BLD: 7.98 K/UL — SIGNIFICANT CHANGE UP (ref 3.8–10.5)
WBC # FLD AUTO: 7.98 K/UL — SIGNIFICANT CHANGE UP (ref 3.8–10.5)

## 2024-04-18 PROCEDURE — 99233 SBSQ HOSP IP/OBS HIGH 50: CPT

## 2024-04-18 RX ORDER — POTASSIUM CHLORIDE 20 MEQ
20 PACKET (EA) ORAL ONCE
Refills: 0 | Status: COMPLETED | OUTPATIENT
Start: 2024-04-18 | End: 2024-04-18

## 2024-04-18 RX ADMIN — Medication 975 MILLIGRAM(S): at 22:01

## 2024-04-18 RX ADMIN — Medication 200 MILLIGRAM(S): at 05:33

## 2024-04-18 RX ADMIN — METHOCARBAMOL 500 MILLIGRAM(S): 500 TABLET, FILM COATED ORAL at 05:33

## 2024-04-18 RX ADMIN — Medication 120 MILLIGRAM(S): at 11:55

## 2024-04-18 RX ADMIN — METHOCARBAMOL 500 MILLIGRAM(S): 500 TABLET, FILM COATED ORAL at 22:01

## 2024-04-18 RX ADMIN — TRAMADOL HYDROCHLORIDE 50 MILLIGRAM(S): 50 TABLET ORAL at 15:02

## 2024-04-18 RX ADMIN — TRAMADOL HYDROCHLORIDE 50 MILLIGRAM(S): 50 TABLET ORAL at 16:00

## 2024-04-18 RX ADMIN — DULOXETINE HYDROCHLORIDE 30 MILLIGRAM(S): 30 CAPSULE, DELAYED RELEASE ORAL at 11:56

## 2024-04-18 RX ADMIN — SENNA PLUS 2 TABLET(S): 8.6 TABLET ORAL at 22:02

## 2024-04-18 RX ADMIN — Medication 120 MILLIGRAM(S): at 21:03

## 2024-04-18 RX ADMIN — VALSARTAN 160 MILLIGRAM(S): 80 TABLET ORAL at 11:56

## 2024-04-18 RX ADMIN — METHOCARBAMOL 500 MILLIGRAM(S): 500 TABLET, FILM COATED ORAL at 14:03

## 2024-04-18 RX ADMIN — PANTOPRAZOLE SODIUM 40 MILLIGRAM(S): 20 TABLET, DELAYED RELEASE ORAL at 06:47

## 2024-04-18 RX ADMIN — POLYETHYLENE GLYCOL 3350 17 GRAM(S): 17 POWDER, FOR SOLUTION ORAL at 11:55

## 2024-04-18 RX ADMIN — DULOXETINE HYDROCHLORIDE 60 MILLIGRAM(S): 30 CAPSULE, DELAYED RELEASE ORAL at 11:56

## 2024-04-18 RX ADMIN — Medication 200 MILLIGRAM(S): at 17:53

## 2024-04-18 RX ADMIN — ATORVASTATIN CALCIUM 80 MILLIGRAM(S): 80 TABLET, FILM COATED ORAL at 22:02

## 2024-04-18 RX ADMIN — TRAMADOL HYDROCHLORIDE 50 MILLIGRAM(S): 50 TABLET ORAL at 22:10

## 2024-04-18 RX ADMIN — ENOXAPARIN SODIUM 40 MILLIGRAM(S): 100 INJECTION SUBCUTANEOUS at 22:02

## 2024-04-18 RX ADMIN — Medication 975 MILLIGRAM(S): at 14:03

## 2024-04-18 RX ADMIN — TRAMADOL HYDROCHLORIDE 50 MILLIGRAM(S): 50 TABLET ORAL at 01:30

## 2024-04-18 RX ADMIN — VALSARTAN 160 MILLIGRAM(S): 80 TABLET ORAL at 21:03

## 2024-04-18 RX ADMIN — TRAMADOL HYDROCHLORIDE 50 MILLIGRAM(S): 50 TABLET ORAL at 21:08

## 2024-04-18 RX ADMIN — TRAMADOL HYDROCHLORIDE 50 MILLIGRAM(S): 50 TABLET ORAL at 06:10

## 2024-04-18 RX ADMIN — TRAMADOL HYDROCHLORIDE 50 MILLIGRAM(S): 50 TABLET ORAL at 05:33

## 2024-04-18 RX ADMIN — Medication 975 MILLIGRAM(S): at 05:33

## 2024-04-18 RX ADMIN — TRAMADOL HYDROCHLORIDE 50 MILLIGRAM(S): 50 TABLET ORAL at 00:45

## 2024-04-18 RX ADMIN — Medication 20 MILLIEQUIVALENT(S): at 11:56

## 2024-04-18 NOTE — PROGRESS NOTE ADULT - SUBJECTIVE AND OBJECTIVE BOX
Ortho Note    Pt seen and examined. Reports feeling well today, but c/o some abdominal cramping/ pain and constipation.  Back pain controlled. Ambulated to and from commode overnight/ voiding. Denies headache, vision changes, light sensitivity,   Denies CP, SOB, N/V, numbness/tingling.     Vital Signs Last 24 Hrs  T(C): 36.9 (04-18-24 @ 05:29), Max: 36.9 (04-18-24 @ 05:29)  T(F): 98.4 (04-18-24 @ 05:29), Max: 98.4 (04-18-24 @ 05:29)  HR: 90 (04-18-24 @ 05:29) (90 - 90)  BP: 145/67 (04-18-24 @ 05:29) (145/67 - 145/67)  BP(mean): --  RR: 18 (04-18-24 @ 05:29) (18 - 18)  SpO2: 96% (04-18-24 @ 05:29) (96% - 96%)  AVSS    General: Pt Alert and oriented, NAD  DSG- gauze/ abd pads C/D/I, HV x 2 in place  Pulses: +2DP, WWP feet  Sensation: SILT BLE  Motor: 5/5 EHL/FHL/TA/GS BLE, hip/knee flexion intact bilaterally in bed                        8.6    7.98  )-----------( 177      ( 18 Apr 2024 05:30 )             26.8     04-18    139  |  106  |  15  ----------------------------<  126<H>  3.7   |  24  |  0.89    Ca    8.9      18 Apr 2024 05:30        A/P: 83yFemale POD#3 s/p T9-pelvis PSF with course c/b dural tear  - VSS, Labs WNL- appreciate internal medicine co-management recommendations  - Pain Control  - DVT ppx: LVX 40mg subq daily, SCDs  - PT, OT, WBS: WBAT  - dural tear- resolved, on bed rest  4/16, tolerated HOB elevation; tolerated PT/OT yesterday without complaints  - monitor HV x 2 output- continue drains off suction  - OOB for meals as tolerated, I/S  - constipation- suppository ordered for after PT today  - passed TOV after peterson removed yesterday  - dispo: CAITLYN pending authorization (has acceptance)    Ortho Pager [phone]

## 2024-04-18 NOTE — PROGRESS NOTE ADULT - SUBJECTIVE AND OBJECTIVE BOX
INTERVAL HPI/OVERNIGHT EVENTS: donte o/n    SUBJECTIVE: Patient seen and examined at bedside.   Reports pain is controlled. Was able to mobilize to stand w 2 assist. Waiting for aides to help her to bathroom. Voiding wo dysuria. +FLatus wo BM. No LH/dizziness, chest pain, dyspnea. Still feelina bit weak and unsteady w PT    OBJECTIVE:    VITAL SIGNS:  ICU Vital Signs Last 24 Hrs  T(C): 37.1 (18 Apr 2024 16:55), Max: 37.1 (18 Apr 2024 00:42)  T(F): 98.7 (18 Apr 2024 16:55), Max: 98.8 (18 Apr 2024 00:42)  HR: 81 (18 Apr 2024 16:55) (80 - 90)  BP: 148/70 (18 Apr 2024 16:55) (116/62 - 153/67)  BP(mean): --  ABP: --  ABP(mean): --  RR: 17 (18 Apr 2024 16:55) (17 - 18)  SpO2: 93% (18 Apr 2024 16:55) (92% - 97%)    O2 Parameters below as of 18 Apr 2024 16:55  Patient On (Oxygen Delivery Method): room air              04-17 @ 07:01  -  04-18 @ 07:00  --------------------------------------------------------  IN: 450 mL / OUT: 1500 mL / NET: -1050 mL    04-18 @ 07:01 - 04-18 @ 18:45  --------------------------------------------------------  IN: 0 mL / OUT: 380 mL / NET: -380 mL      CAPILLARY BLOOD GLUCOSE          PHYSICAL EXAM:  GEN: female in NAD on RA  HEENT: NC/AT, MMM  CV: RRR, nml S1S2, no murmurs  PULM: nml effort, CTAB  ABD: Soft, non-distended, NABS, non-tender  NEURO  A/O x3, moving all extremities, Sensation intact. Lumbar dressing c/d/i  5/5 in b/l plantarflex/ext  PSYCH: Appropriate    MEDICATIONS:  MEDICATIONS  (STANDING):  acetaminophen     Tablet .. 975 milliGRAM(s) Oral every 8 hours  atorvastatin 80 milliGRAM(s) Oral at bedtime  bisacodyl Suppository 10 milliGRAM(s) Rectal once  diltiazem    milliGRAM(s) Oral <User Schedule>  DULoxetine 60 milliGRAM(s) Oral daily  DULoxetine 30 milliGRAM(s) Oral daily  enoxaparin Injectable 40 milliGRAM(s) SubCutaneous every 24 hours  hydroxychloroquine 200 milliGRAM(s) Oral two times a day  methocarbamol 500 milliGRAM(s) Oral every 8 hours  pantoprazole    Tablet 40 milliGRAM(s) Oral before breakfast  polyethylene glycol 3350 17 Gram(s) Oral daily  senna 2 Tablet(s) Oral at bedtime  valsartan 160 milliGRAM(s) Oral every 12 hours    MEDICATIONS  (PRN):  albuterol    90 MICROgram(s) HFA Inhaler 2 Puff(s) Inhalation every 6 hours PRN Shortness of Breath and/or Wheezing  bisacodyl Suppository 10 milliGRAM(s) Rectal daily PRN Constipation  magnesium hydroxide Suspension 30 milliLiter(s) Oral daily PRN Constipation  traMADol 50 milliGRAM(s) Oral every 4 hours PRN Severe Pain (7 - 10)  traMADol 25 milliGRAM(s) Oral every 4 hours PRN Moderate Pain (4 - 6)      ALLERGIES:  Allergies    penicillin (Unknown)  venlafaxine (Unknown)    Intolerances        LABS:                        8.6    7.98  )-----------( 177      ( 18 Apr 2024 05:30 )             26.8     04-18    139  |  106  |  15  ----------------------------<  126<H>  3.7   |  24  |  0.89    Ca    8.9      18 Apr 2024 05:30        Urinalysis Basic - ( 18 Apr 2024 05:30 )    Color: x / Appearance: x / SG: x / pH: x  Gluc: 126 mg/dL / Ketone: x  / Bili: x / Urobili: x   Blood: x / Protein: x / Nitrite: x   Leuk Esterase: x / RBC: x / WBC x   Sq Epi: x / Non Sq Epi: x / Bacteria: x        RADIOLOGY & ADDITIONAL TESTS: Reviewed.

## 2024-04-18 NOTE — PROGRESS NOTE ADULT - ASSESSMENT
83F w CKD3, HTN, CAD, Asthma, Anxiety, prior breast CA, w acute on chronic back pain radiating down L>RLE, here for T9-Pelvis PSF w Dr. Salguero 4/15, complicated by dural tear, for CAITLYN    #Post-op state - pain controlled. PPx: SQL. On bowel regimen and incentive spirometer  #Lumbar radiculopathy   - Tylenol 1g q8, Robaxin 500 q8, Lyrica 50 q8   - PRN: Tramadol 25/50 q4 for mod/severe    #Erosive osteoarthritis - on home Hydroxychloroquine 200 BID.   #Leukocytosis - resolved.   #Acute blood loss anemia - hgb 8.6 from 9.1 from 9.7. Baseline 10.3. Asymptomatic  #HTN - on valsartan 160 BID, Diltiazem 120 BID  #CAD - on aspirin daily  #Asthma - on PRN albuterol    #Anxiety - on duloxetine 90mg daily  #CKD3 - baseline Cr 1.2. 0.89 today    Plan  Continue PT. Passed TOV  Mobilize OOB to chair    CBC w diff, BMP in AM    DISPO: CAITLYN  Above d/w orthopedics

## 2024-04-18 NOTE — PROGRESS NOTE ADULT - SUBJECTIVE AND OBJECTIVE BOX
Ortho Progress Note    Procedure:  T9-Pelvis PSF, c/b intraop Dural Tear  Surgeon: Dr. TONO Salguero    Pt up and ambulated w/PT yesterday. No headaches but reports legs feel weak.  Denies CP, SOB, N/V, numbness/tingling     Vital Signs Last 24 Hrs  T(C): 36.9 (18 Apr 2024 05:29), Max: 37.1 (18 Apr 2024 00:42)  T(F): 98.4 (18 Apr 2024 05:29), Max: 98.8 (18 Apr 2024 00:42)  HR: 90 (18 Apr 2024 05:29) (80 - 98)  BP: 145/67 (18 Apr 2024 05:29) (99/49 - 152/68)  BP(mean): --  RR: 18 (18 Apr 2024 05:29) (18 - 18)  SpO2: 96% (18 Apr 2024 05:29) (92% - 96%)    Parameters below as of 18 Apr 2024 05:29  Patient On (Oxygen Delivery Method): nasal cannula  O2 Flow (L/min): 2    General: Pt Alert and oriented, NAD  Drains: HVx2 to gravity  Pulses: 2+ DP  Sensation: SILT grossly L2-S1 bilaterally  Motor: 5/5 L2-S1 bilaterally    A/P: 83yFemale s/p T9-Pelvis PSF c/b intraop dural tear by Dr. TONO Salguero on 04-15  - Stable  - Pain Control  - DVT ppx: SCDs  - Post op abx: Ancef  - Drains: HVx2 to gravity  - CAITLYN  - Passed TOV

## 2024-04-19 ENCOUNTER — TRANSCRIPTION ENCOUNTER (OUTPATIENT)
Age: 84
End: 2024-04-19

## 2024-04-19 VITALS
OXYGEN SATURATION: 97 % | RESPIRATION RATE: 18 BRPM | DIASTOLIC BLOOD PRESSURE: 66 MMHG | HEART RATE: 97 BPM | TEMPERATURE: 98 F | SYSTOLIC BLOOD PRESSURE: 134 MMHG

## 2024-04-19 LAB
ANION GAP SERPL CALC-SCNC: 10 MMOL/L — SIGNIFICANT CHANGE UP (ref 5–17)
BUN SERPL-MCNC: 12 MG/DL — SIGNIFICANT CHANGE UP (ref 7–23)
CALCIUM SERPL-MCNC: 8.9 MG/DL — SIGNIFICANT CHANGE UP (ref 8.4–10.5)
CHLORIDE SERPL-SCNC: 100 MMOL/L — SIGNIFICANT CHANGE UP (ref 96–108)
CO2 SERPL-SCNC: 25 MMOL/L — SIGNIFICANT CHANGE UP (ref 22–31)
CREAT SERPL-MCNC: 0.91 MG/DL — SIGNIFICANT CHANGE UP (ref 0.5–1.3)
EGFR: 63 ML/MIN/1.73M2 — SIGNIFICANT CHANGE UP
GLUCOSE SERPL-MCNC: 101 MG/DL — HIGH (ref 70–99)
HCT VFR BLD CALC: 29.7 % — LOW (ref 34.5–45)
HGB BLD-MCNC: 9.5 G/DL — LOW (ref 11.5–15.5)
MCHC RBC-ENTMCNC: 30.4 PG — SIGNIFICANT CHANGE UP (ref 27–34)
MCHC RBC-ENTMCNC: 32 GM/DL — SIGNIFICANT CHANGE UP (ref 32–36)
MCV RBC AUTO: 95.2 FL — SIGNIFICANT CHANGE UP (ref 80–100)
NRBC # BLD: 0 /100 WBCS — SIGNIFICANT CHANGE UP (ref 0–0)
PLATELET # BLD AUTO: 213 K/UL — SIGNIFICANT CHANGE UP (ref 150–400)
POTASSIUM SERPL-MCNC: 4 MMOL/L — SIGNIFICANT CHANGE UP (ref 3.5–5.3)
POTASSIUM SERPL-SCNC: 4 MMOL/L — SIGNIFICANT CHANGE UP (ref 3.5–5.3)
RBC # BLD: 3.12 M/UL — LOW (ref 3.8–5.2)
RBC # FLD: 13.4 % — SIGNIFICANT CHANGE UP (ref 10.3–14.5)
SODIUM SERPL-SCNC: 135 MMOL/L — SIGNIFICANT CHANGE UP (ref 135–145)
WBC # BLD: 8.05 K/UL — SIGNIFICANT CHANGE UP (ref 3.8–10.5)
WBC # FLD AUTO: 8.05 K/UL — SIGNIFICANT CHANGE UP (ref 3.8–10.5)

## 2024-04-19 PROCEDURE — 86901 BLOOD TYPING SEROLOGIC RH(D): CPT

## 2024-04-19 PROCEDURE — 84295 ASSAY OF SERUM SODIUM: CPT

## 2024-04-19 PROCEDURE — C1889: CPT

## 2024-04-19 PROCEDURE — 82947 ASSAY GLUCOSE BLOOD QUANT: CPT

## 2024-04-19 PROCEDURE — C1769: CPT

## 2024-04-19 PROCEDURE — 36415 COLL VENOUS BLD VENIPUNCTURE: CPT

## 2024-04-19 PROCEDURE — 97116 GAIT TRAINING THERAPY: CPT

## 2024-04-19 PROCEDURE — 97162 PT EVAL MOD COMPLEX 30 MIN: CPT

## 2024-04-19 PROCEDURE — 82330 ASSAY OF CALCIUM: CPT

## 2024-04-19 PROCEDURE — 36430 TRANSFUSION BLD/BLD COMPNT: CPT

## 2024-04-19 PROCEDURE — P9016: CPT

## 2024-04-19 PROCEDURE — 76000 FLUOROSCOPY <1 HR PHYS/QHP: CPT

## 2024-04-19 PROCEDURE — 85025 COMPLETE CBC W/AUTO DIFF WBC: CPT

## 2024-04-19 PROCEDURE — 86900 BLOOD TYPING SEROLOGIC ABO: CPT

## 2024-04-19 PROCEDURE — 84132 ASSAY OF SERUM POTASSIUM: CPT

## 2024-04-19 PROCEDURE — C1713: CPT

## 2024-04-19 PROCEDURE — 86850 RBC ANTIBODY SCREEN: CPT

## 2024-04-19 PROCEDURE — 99233 SBSQ HOSP IP/OBS HIGH 50: CPT

## 2024-04-19 PROCEDURE — 85027 COMPLETE CBC AUTOMATED: CPT

## 2024-04-19 PROCEDURE — 97165 OT EVAL LOW COMPLEX 30 MIN: CPT

## 2024-04-19 PROCEDURE — 85018 HEMOGLOBIN: CPT

## 2024-04-19 PROCEDURE — 80048 BASIC METABOLIC PNL TOTAL CA: CPT

## 2024-04-19 PROCEDURE — 86923 COMPATIBILITY TEST ELECTRIC: CPT

## 2024-04-19 RX ORDER — ONDANSETRON 8 MG/1
4 TABLET, FILM COATED ORAL EVERY 6 HOURS
Refills: 0 | Status: DISCONTINUED | OUTPATIENT
Start: 2024-04-19 | End: 2024-04-19

## 2024-04-19 RX ORDER — ONDANSETRON 8 MG/1
4 TABLET, FILM COATED ORAL ONCE
Refills: 0 | Status: COMPLETED | OUTPATIENT
Start: 2024-04-19 | End: 2024-04-19

## 2024-04-19 RX ORDER — HYDROMORPHONE HYDROCHLORIDE 2 MG/ML
0.5 INJECTION INTRAMUSCULAR; INTRAVENOUS; SUBCUTANEOUS ONCE
Refills: 0 | Status: DISCONTINUED | OUTPATIENT
Start: 2024-04-19 | End: 2024-04-19

## 2024-04-19 RX ORDER — ENOXAPARIN SODIUM 100 MG/ML
40 INJECTION SUBCUTANEOUS
Qty: 0 | Refills: 0 | DISCHARGE
Start: 2024-04-19

## 2024-04-19 RX ORDER — PANTOPRAZOLE SODIUM 20 MG/1
1 TABLET, DELAYED RELEASE ORAL
Qty: 0 | Refills: 0 | DISCHARGE
Start: 2024-04-19

## 2024-04-19 RX ORDER — METHOCARBAMOL 500 MG/1
1 TABLET, FILM COATED ORAL
Qty: 0 | Refills: 0 | DISCHARGE
Start: 2024-04-19

## 2024-04-19 RX ORDER — POLYETHYLENE GLYCOL 3350 17 G/17G
17 POWDER, FOR SOLUTION ORAL
Qty: 0 | Refills: 0 | DISCHARGE
Start: 2024-04-19

## 2024-04-19 RX ORDER — SENNA PLUS 8.6 MG/1
2 TABLET ORAL
Qty: 0 | Refills: 0 | DISCHARGE
Start: 2024-04-19

## 2024-04-19 RX ADMIN — Medication 975 MILLIGRAM(S): at 13:29

## 2024-04-19 RX ADMIN — Medication 975 MILLIGRAM(S): at 05:27

## 2024-04-19 RX ADMIN — METHOCARBAMOL 500 MILLIGRAM(S): 500 TABLET, FILM COATED ORAL at 13:28

## 2024-04-19 RX ADMIN — ONDANSETRON 4 MILLIGRAM(S): 8 TABLET, FILM COATED ORAL at 11:37

## 2024-04-19 RX ADMIN — DULOXETINE HYDROCHLORIDE 30 MILLIGRAM(S): 30 CAPSULE, DELAYED RELEASE ORAL at 08:02

## 2024-04-19 RX ADMIN — Medication 10 MILLIGRAM(S): at 08:02

## 2024-04-19 RX ADMIN — TRAMADOL HYDROCHLORIDE 50 MILLIGRAM(S): 50 TABLET ORAL at 19:00

## 2024-04-19 RX ADMIN — VALSARTAN 160 MILLIGRAM(S): 80 TABLET ORAL at 11:25

## 2024-04-19 RX ADMIN — PANTOPRAZOLE SODIUM 40 MILLIGRAM(S): 20 TABLET, DELAYED RELEASE ORAL at 06:33

## 2024-04-19 RX ADMIN — DULOXETINE HYDROCHLORIDE 60 MILLIGRAM(S): 30 CAPSULE, DELAYED RELEASE ORAL at 08:06

## 2024-04-19 RX ADMIN — Medication 120 MILLIGRAM(S): at 11:25

## 2024-04-19 RX ADMIN — TRAMADOL HYDROCHLORIDE 50 MILLIGRAM(S): 50 TABLET ORAL at 18:11

## 2024-04-19 RX ADMIN — Medication 200 MILLIGRAM(S): at 05:27

## 2024-04-19 RX ADMIN — TRAMADOL HYDROCHLORIDE 50 MILLIGRAM(S): 50 TABLET ORAL at 05:30

## 2024-04-19 RX ADMIN — TRAMADOL HYDROCHLORIDE 50 MILLIGRAM(S): 50 TABLET ORAL at 04:29

## 2024-04-19 RX ADMIN — HYDROMORPHONE HYDROCHLORIDE 0.5 MILLIGRAM(S): 2 INJECTION INTRAMUSCULAR; INTRAVENOUS; SUBCUTANEOUS at 00:51

## 2024-04-19 RX ADMIN — HYDROMORPHONE HYDROCHLORIDE 0.5 MILLIGRAM(S): 2 INJECTION INTRAMUSCULAR; INTRAVENOUS; SUBCUTANEOUS at 00:36

## 2024-04-19 RX ADMIN — METHOCARBAMOL 500 MILLIGRAM(S): 500 TABLET, FILM COATED ORAL at 05:27

## 2024-04-19 NOTE — PROGRESS NOTE ADULT - SUBJECTIVE AND OBJECTIVE BOX
INTERVAL HPI/OVERNIGHT EVENTS: donte o/n    SUBJECTIVE: Patient seen and examined at bedside.   Reports pain controlled. Felt slightly nauseated this morning but no emesis. Passing flatus. Voiding. No chest pain, dyspnea, LH/dizziness, fevers. Ate some lunch.    OBJECTIVE:    VITAL SIGNS:  ICU Vital Signs Last 24 Hrs  T(C): 36.7 (19 Apr 2024 17:38), Max: 37.3 (19 Apr 2024 14:27)  T(F): 98 (19 Apr 2024 17:38), Max: 99.1 (19 Apr 2024 14:27)  HR: 97 (19 Apr 2024 17:38) (88 - 103)  BP: 134/66 (19 Apr 2024 17:38) (108/67 - 180/80)  BP(mean): --  ABP: --  ABP(mean): --  RR: 18 (19 Apr 2024 17:38) (16 - 20)  SpO2: 97% (19 Apr 2024 17:38) (95% - 98%)    O2 Parameters below as of 19 Apr 2024 17:38  Patient On (Oxygen Delivery Method): room air              04-18 @ 07:01 - 04-19 @ 07:00  --------------------------------------------------------  IN: 0 mL / OUT: 1090 mL / NET: -1090 mL    04-19 @ 07:01 - 04-19 @ 17:41  --------------------------------------------------------  IN: 0 mL / OUT: 908 mL / NET: -908 mL      CAPILLARY BLOOD GLUCOSE          PHYSICAL EXAM:  GEN: female in NAD on RA  HEENT: NC/AT, MMM  CV: RRR, nml S1S2, no murmurs  PULM: nml effort, CTAB  ABD: Soft, non-distended, NABS, non-tender  NEURO  A/O x3, moving all extremities, Sensation intact. Lumbar dressing c/d/i  5/5 in b/l plantarflex/ext  PSYCH: Appropriate    MEDICATIONS:  MEDICATIONS  (STANDING):  acetaminophen     Tablet .. 975 milliGRAM(s) Oral every 8 hours  atorvastatin 80 milliGRAM(s) Oral at bedtime  diltiazem    milliGRAM(s) Oral <User Schedule>  DULoxetine 60 milliGRAM(s) Oral daily  DULoxetine 30 milliGRAM(s) Oral daily  enoxaparin Injectable 40 milliGRAM(s) SubCutaneous every 24 hours  hydroxychloroquine 200 milliGRAM(s) Oral two times a day  methocarbamol 500 milliGRAM(s) Oral every 8 hours  pantoprazole    Tablet 40 milliGRAM(s) Oral before breakfast  polyethylene glycol 3350 17 Gram(s) Oral daily  saline laxative (FLEET) Rectal Enema 1 Enema Rectal once  senna 2 Tablet(s) Oral at bedtime  valsartan 160 milliGRAM(s) Oral every 12 hours    MEDICATIONS  (PRN):  albuterol    90 MICROgram(s) HFA Inhaler 2 Puff(s) Inhalation every 6 hours PRN Shortness of Breath and/or Wheezing  bisacodyl Suppository 10 milliGRAM(s) Rectal daily PRN Constipation  bisacodyl Suppository 10 milliGRAM(s) Rectal once PRN Constipation  magnesium hydroxide Suspension 30 milliLiter(s) Oral daily PRN Constipation  ondansetron Injectable 4 milliGRAM(s) IV Push every 6 hours PRN Nausea and/or Vomiting  traMADol 50 milliGRAM(s) Oral every 4 hours PRN Severe Pain (7 - 10)  traMADol 25 milliGRAM(s) Oral every 4 hours PRN Moderate Pain (4 - 6)      ALLERGIES:  Allergies    penicillin (Unknown)  venlafaxine (Unknown)    Intolerances        LABS:                        9.5    8.05  )-----------( 213      ( 19 Apr 2024 07:29 )             29.7     04-19    135  |  100  |  12  ----------------------------<  101<H>  4.0   |  25  |  0.91    Ca    8.9      19 Apr 2024 07:29        Urinalysis Basic - ( 19 Apr 2024 07:29 )    Color: x / Appearance: x / SG: x / pH: x  Gluc: 101 mg/dL / Ketone: x  / Bili: x / Urobili: x   Blood: x / Protein: x / Nitrite: x   Leuk Esterase: x / RBC: x / WBC x   Sq Epi: x / Non Sq Epi: x / Bacteria: x        RADIOLOGY & ADDITIONAL TESTS: Reviewed.

## 2024-04-19 NOTE — PROGRESS NOTE ADULT - SUBJECTIVE AND OBJECTIVE BOX
Ortho Progress Note    Procedure:  T9-Pelvis PSF, c/b intraop Dural Tear  Surgeon: Dr. TONO Salguero    Pt up and ambulated w/PT yesterday. Did well but had acute pain episode overnight.  Denies CP, SOB, N/V, numbness/tingling     Vital Signs Last 24 Hrs  T(C): 37.1 (19 Apr 2024 04:25), Max: 37.1 (18 Apr 2024 16:55)  T(F): 98.7 (19 Apr 2024 04:25), Max: 98.7 (18 Apr 2024 16:55)  HR: 89 (19 Apr 2024 04:25) (81 - 103)  BP: 170/75 (19 Apr 2024 04:25) (116/62 - 180/80)  BP(mean): --  RR: 18 (19 Apr 2024 04:25) (16 - 20)  SpO2: 98% (19 Apr 2024 04:25) (93% - 98%)    Parameters below as of 19 Apr 2024 04:25  Patient On (Oxygen Delivery Method): room air    General: Pt Alert and oriented, NAD  Drains: HVx2 to gravity  Pulses: 2+ DP  Sensation: SILT grossly L2-S1 bilaterally  Motor: 5/5 L2-S1 bilaterally    A/P: 83yFemale s/p T9-Pelvis PSF c/b intraop dural tear by Dr. TONO Salguero on 04-15  - Stable  - Pain Control  - DVT ppx: SCDs  - Post op abx: Ancef  - Drains: HVx2 to gravity  - CAITLYN  - Passed TOV  - Upgrade bowel regimen plan for BM today; suppository ordered

## 2024-04-19 NOTE — DISCHARGE NOTE NURSING/CASE MANAGEMENT/SOCIAL WORK - NSDCPEFALRISK_GEN_ALL_CORE
For information on Fall & Injury Prevention, visit: [email or web address] OR  [email or web address] OR  https://www.cdc.gov/steadi/patient.html

## 2024-04-19 NOTE — PROGRESS NOTE ADULT - ASSESSMENT
83F w CKD3, HTN, CAD, Asthma, Anxiety, prior breast CA, w acute on chronic back pain radiating down L>RLE, here for T9-Pelvis PSF w Dr. Salguero 4/15, complicated by dural tear, for CAITLYN    #Post-op state - pain controlled. PPx: SQL. On bowel regimen and incentive spirometer  #Lumbar radiculopathy   - Tylenol 1g q8, Robaxin 500 q8, Lyrica 50 q8   - PRN: Tramadol 25/50 q4 for mod/severe    #Erosive osteoarthritis - on home Hydroxychloroquine 200 BID.   #Leukocytosis - resolved.   #Acute blood loss anemia - hgb 9.5 today. Baseline 10.3. Asymptomatic  #HTN - on valsartan 160 BID, Diltiazem 120 BID  #CAD - on aspirin daily  #Asthma - on PRN albuterol    #Anxiety - on duloxetine 90mg daily  #CKD3 - baseline Cr 1.2. 0.91 today    Plan  Continue PT. Optimized for disposition    DISPO: CAITLYN  Above d/w orthopedics

## 2024-04-19 NOTE — DISCHARGE NOTE NURSING/CASE MANAGEMENT/SOCIAL WORK - PATIENT PORTAL LINK FT
You can access the FollowMyHealth Patient Portal offered by [facility] by registering at the following website: [email or web address]. By joining [employer]’s FollowMyHealth portal, you will also be able to view your health information using other applications (apps) compatible with our system.

## 2024-04-19 NOTE — PROGRESS NOTE ADULT - PROVIDER SPECIALTY LIST ADULT
Orthopedics
Hospitalist
Orthopedics
Hospitalist
Hospitalist
Orthopedics
Orthopedics

## 2024-04-19 NOTE — PROGRESS NOTE ADULT - SUBJECTIVE AND OBJECTIVE BOX
Ortho Note    Pt comfortable without complaints, pain controlled  Denies CP, SOB, N/V, numbness/tingling     Vital Signs Last 24 Hrs  T(C): 36.9 (04-19-24 @ 10:20), Max: 36.9 (04-19-24 @ 10:20)  T(F): 98.5 (04-19-24 @ 10:20), Max: 98.5 (04-19-24 @ 10:20)  HR: 91 (04-19-24 @ 10:20) (91 - 91)  BP: 108/67 (04-19-24 @ 10:20) (108/67 - 108/67)  BP(mean): --  RR: 19 (04-19-24 @ 10:20) (19 - 19)  SpO2: 95% (04-19-24 @ 10:20) (95% - 95%)  AVSS    General: Pt Alert and oriented, NAD  DSG- gauze/ abd pads C/D/I, HV x 2 in place  Pulses: +2DP, WWP feet  Sensation: SILT BLE  Motor: 5/5 EHL/FHL/TA/GS BLE, hip/knee flexion intact bilaterally in bed                          9.5    8.05  )-----------( 213      ( 19 Apr 2024 07:29 )             29.7     04-19    135  |  100  |  12  ----------------------------<  101<H>  4.0   |  25  |  0.91    Ca    8.9      19 Apr 2024 07:29        A/P: 83yFemale POD#4 s/p T9-pelvis PSF with course c/b dural tear  - VSS, Labs WNL- appreciate internal medicine co-management recommendations  - Pain Control  - DVT ppx: LVX 40mg subq daily, SCDs  - PT, OT, WBS: WBAT  - dural tear- resolved, on bed rest  4/16, tolerated HOB elevation; tolerating PT/OT without complications  - monitor HV x 2 output- continue drains off suction  - OOB for meals as tolerated, I/S  - constipation- never received ordered suppository yesterday, given suppository this AM, ordered enema for if not effective  - dispo: CAITLYN pending authorization (has acceptance)    Ortho Pager [phone] Ortho Note    Pt comfortable without complaints, pain controlled  Denies CP, SOB, N/V, numbness/tingling. Reports abd cramping/ pain/ constipation.   Did not receive suppository that was ordered yesterday for nursing to give. Placed timed order this morning  and received, pending BM.     Vital Signs Last 24 Hrs  T(C): 36.9 (04-19-24 @ 10:20), Max: 36.9 (04-19-24 @ 10:20)  T(F): 98.5 (04-19-24 @ 10:20), Max: 98.5 (04-19-24 @ 10:20)  HR: 91 (04-19-24 @ 10:20) (91 - 91)  BP: 108/67 (04-19-24 @ 10:20) (108/67 - 108/67)  BP(mean): --  RR: 19 (04-19-24 @ 10:20) (19 - 19)  SpO2: 95% (04-19-24 @ 10:20) (95% - 95%)  AVSS    General: Pt Alert and oriented, NAD  DSG- gauze/ abd pads C/D/I, HV x 2 in place  Pulses: +2DP, WWP feet  Sensation: SILT BLE  Motor: 5/5 EHL/FHL/TA/GS BLE, hip/knee flexion intact bilaterally in bed                          9.5    8.05  )-----------( 213      ( 19 Apr 2024 07:29 )             29.7     04-19    135  |  100  |  12  ----------------------------<  101<H>  4.0   |  25  |  0.91    Ca    8.9      19 Apr 2024 07:29        A/P: 83yFemale POD#4 s/p T9-pelvis PSF with course c/b dural tear  - VSS, Labs WNL- appreciate internal medicine co-management recommendations  - Pain Control  - DVT ppx: LVX 40mg subq daily, SCDs  - PT, OT, WBS: WBAT  - dural tear- resolved, on bed rest  4/16, tolerated HOB elevation; tolerating PT/OT without complications  - monitor HV x 2 output- continue drains off suction  - OOB for meals as tolerated, I/S  - constipation- never received ordered suppository yesterday, given suppository this AM, ordered enema for if not effective  - dispo: CAITLYN pending authorization (has acceptance)    Ortho Pager [phone]

## 2024-04-25 DIAGNOSIS — Z85.3 PERSONAL HISTORY OF MALIGNANT NEOPLASM OF BREAST: ICD-10-CM

## 2024-04-25 DIAGNOSIS — J45.909 UNSPECIFIED ASTHMA, UNCOMPLICATED: ICD-10-CM

## 2024-04-25 DIAGNOSIS — M81.0 AGE-RELATED OSTEOPOROSIS WITHOUT CURRENT PATHOLOGICAL FRACTURE: ICD-10-CM

## 2024-04-25 DIAGNOSIS — I25.10 ATHEROSCLEROTIC HEART DISEASE OF NATIVE CORONARY ARTERY WITHOUT ANGINA PECTORIS: ICD-10-CM

## 2024-04-25 DIAGNOSIS — E78.5 HYPERLIPIDEMIA, UNSPECIFIED: ICD-10-CM

## 2024-04-25 DIAGNOSIS — M48.04 SPINAL STENOSIS, THORACIC REGION: ICD-10-CM

## 2024-04-25 DIAGNOSIS — M48.061 SPINAL STENOSIS, LUMBAR REGION WITHOUT NEUROGENIC CLAUDICATION: ICD-10-CM

## 2024-04-25 DIAGNOSIS — M51.14 INTERVERTEBRAL DISC DISORDERS WITH RADICULOPATHY, THORACIC REGION: ICD-10-CM
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
## 2024-04-25 DIAGNOSIS — M41.9 SCOLIOSIS, UNSPECIFIED: ICD-10-CM

## 2024-04-25 DIAGNOSIS — M48.05 SPINAL STENOSIS, THORACOLUMBAR REGION: ICD-10-CM

## 2024-04-25 DIAGNOSIS — M47.26 OTHER SPONDYLOSIS WITH RADICULOPATHY, LUMBAR REGION: ICD-10-CM

## 2024-04-25 DIAGNOSIS — M47.24 OTHER SPONDYLOSIS WITH RADICULOPATHY, THORACIC REGION: ICD-10-CM

## 2024-04-25 DIAGNOSIS — M51.16 INTERVERTEBRAL DISC DISORDERS WITH RADICULOPATHY, LUMBAR REGION: ICD-10-CM

## 2024-04-25 DIAGNOSIS — M51.15 INTERVERTEBRAL DISC DISORDERS WITH RADICULOPATHY, THORACOLUMBAR REGION: ICD-10-CM

## 2024-04-25 DIAGNOSIS — M47.25 OTHER SPONDYLOSIS WITH RADICULOPATHY, THORACOLUMBAR REGION: ICD-10-CM

## 2024-04-25 DIAGNOSIS — G97.41 ACCIDENTAL PUNCTURE OR LACERATION OF DURA DURING A PROCEDURE: ICD-10-CM

## 2024-04-25 DIAGNOSIS — Z98.1 ARTHRODESIS STATUS: ICD-10-CM

## 2024-04-25 DIAGNOSIS — F41.9 ANXIETY DISORDER, UNSPECIFIED: ICD-10-CM

## 2024-04-25 DIAGNOSIS — Z88.0 ALLERGY STATUS TO PENICILLIN: ICD-10-CM

## 2024-04-25 DIAGNOSIS — Z96.653 PRESENCE OF ARTIFICIAL KNEE JOINT, BILATERAL: ICD-10-CM
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
## 2024-04-25 DIAGNOSIS — I12.9 HYPERTENSIVE CHRONIC KIDNEY DISEASE WITH STAGE 1 THROUGH STAGE 4 CHRONIC KIDNEY DISEASE, OR UNSPECIFIED CHRONIC KIDNEY DISEASE: ICD-10-CM

## 2024-04-25 DIAGNOSIS — Z79.899 OTHER LONG TERM (CURRENT) DRUG THERAPY: ICD-10-CM

## 2024-04-25 DIAGNOSIS — Z96.643 PRESENCE OF ARTIFICIAL HIP JOINT, BILATERAL: ICD-10-CM

## 2024-04-25 DIAGNOSIS — Z79.82 LONG TERM (CURRENT) USE OF ASPIRIN: ICD-10-CM

## 2024-04-25 DIAGNOSIS — N18.30 CHRONIC KIDNEY DISEASE, STAGE 3 UNSPECIFIED: ICD-10-CM

## 2024-04-25 DIAGNOSIS — Z88.8 ALLERGY STATUS TO OTHER DRUGS, MEDICAMENTS AND BIOLOGICAL SUBSTANCES: ICD-10-CM
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
## 2024-04-30 ENCOUNTER — APPOINTMENT (OUTPATIENT)
Dept: ORTHOPEDIC SURGERY | Facility: CLINIC | Age: 84
End: 2024-04-30
Payer: MEDICARE

## 2024-04-30 DIAGNOSIS — M54.14 SPINAL STENOSIS, THORACIC REGION: ICD-10-CM

## 2024-04-30 DIAGNOSIS — M48.04 SPINAL STENOSIS, THORACIC REGION: ICD-10-CM

## 2024-04-30 DIAGNOSIS — M48.062 SPINAL STENOSIS, LUMBAR REGION WITH NEUROGENIC CLAUDICATION: ICD-10-CM

## 2024-04-30 PROCEDURE — 72080 X-RAY EXAM THORACOLMB 2/> VW: CPT

## 2024-04-30 PROCEDURE — 99024 POSTOP FOLLOW-UP VISIT: CPT

## 2024-04-30 RX ORDER — TRAMADOL HYDROCHLORIDE 50 MG/1
50 TABLET, COATED ORAL
Qty: 40 | Refills: 0 | Status: DISCONTINUED | COMMUNITY
Start: 2024-03-28 | End: 2024-04-30

## 2024-05-01 PROBLEM — M47.9 SPONDYLOSIS, UNSPECIFIED: Chronic | Status: ACTIVE | Noted: 2024-04-12

## 2024-05-01 PROBLEM — E78.5 HYPERLIPIDEMIA, UNSPECIFIED: Chronic | Status: ACTIVE | Noted: 2024-04-12

## 2024-05-01 PROBLEM — N18.30 CHRONIC KIDNEY DISEASE, STAGE 3 UNSPECIFIED: Chronic | Status: ACTIVE | Noted: 2024-04-12

## 2024-05-01 PROBLEM — I10 ESSENTIAL (PRIMARY) HYPERTENSION: Chronic | Status: ACTIVE | Noted: 2024-04-12

## 2024-05-01 PROBLEM — F41.9 ANXIETY DISORDER, UNSPECIFIED: Chronic | Status: ACTIVE | Noted: 2024-04-12

## 2024-05-01 PROBLEM — J45.909 UNSPECIFIED ASTHMA, UNCOMPLICATED: Chronic | Status: ACTIVE | Noted: 2024-04-12

## 2024-05-01 PROBLEM — I25.10 ATHEROSCLEROTIC HEART DISEASE OF NATIVE CORONARY ARTERY WITHOUT ANGINA PECTORIS: Chronic | Status: ACTIVE | Noted: 2024-04-12

## 2024-05-01 NOTE — HISTORY OF PRESENT ILLNESS
[Procedure: ___] : status post [unfilled] [___ Weeks Post Op] : [unfilled] weeks post op [Chills] : no chills [Fever] : no fever [de-identified] : Komal is here with her . She is in a wheelchair. He is on a walker. She is able to stand and walk with physiotherapy, albeit with difficulty.  She has intermittent severe left-sided leg pain. Yesterday she had no pain at all. She's been taking hydrocodone, 5 mg, which is not strong enough as per the patient, but 10 milligrams is "too strong".   [de-identified] : Her incision is dry and healing well. She is neurologically intact.  Wheelchair.  [de-identified] : Her x-ray shows solid fixation.   [de-identified] : We discussed the surgery, the drains, the pain, the cerebral spinal fluid leak, the incision, and we also reviewed her x-rays.   [de-identified] : I recommended that she try 7.5 milligrams hydrocodone.  I'm optimistic she will improve and the variable nature of her left leg pain makes me optimistic it will resolve.   The pain management people should be free to contact us via phone or email with any questions or for our recommendations.   She will continue with physiotherapy, modify her activity, and follow up in one month.  I'm optimistic she will be done with rehab within weeks.

## 2024-05-01 NOTE — ADDENDUM
[FreeTextEntry1] :  Documented by Moon Dela Cruz acting as a scribe under Dr. Robi Salguero. 04/30/2024

## 2024-05-01 NOTE — END OF VISIT
[FreeTextEntry3] : I Gianna Hathaway, acting as scribe, attest that this documentation has been prepared under the direction and in the presence of Provider Robi Salguero MD.   I was physically present during the service to the patient and/or personally examined the patient and I was directly involved in the management plan and recommendations of the care provided to the patient.   I Dr. Salguero, reviewed the history, the physical exam, and plan as documented by the PA. The documentation recorded by the PA, in my presence, accurately reflects the service I personally performed, and the decisions made by me with my edits as appropriate.  Robi Salguero MD    Documented by Moon Dela Cruz acting as a scribe for Dr. Robi Salguero. 04/30/2024   All medical record entries made by the Scribe were at my, Dr. Robi Salguero. direction and personally dictated by me on 04/30/2024. I have reviewed the chart and agree that the record accurately reflects my personal performance of the history, physical exam, assessment and plan. I have also personally directed, reviewed, and agreed with the chart.

## 2024-05-28 ENCOUNTER — APPOINTMENT (OUTPATIENT)
Dept: ORTHOPEDIC SURGERY | Facility: CLINIC | Age: 84
End: 2024-05-28
Payer: MEDICARE

## 2024-05-28 VITALS
TEMPERATURE: 96.6 F | HEART RATE: 90 BPM | SYSTOLIC BLOOD PRESSURE: 147 MMHG | HEIGHT: 66.5 IN | DIASTOLIC BLOOD PRESSURE: 76 MMHG | OXYGEN SATURATION: 97 % | WEIGHT: 158 LBS | BODY MASS INDEX: 25.09 KG/M2

## 2024-05-28 DIAGNOSIS — Z98.890 OTHER SPECIFIED POSTPROCEDURAL STATES: ICD-10-CM

## 2024-05-28 DIAGNOSIS — M54.50 LOW BACK PAIN, UNSPECIFIED: ICD-10-CM

## 2024-05-28 PROCEDURE — 72170 X-RAY EXAM OF PELVIS: CPT

## 2024-05-28 PROCEDURE — 72080 X-RAY EXAM THORACOLMB 2/> VW: CPT

## 2024-05-28 PROCEDURE — 99024 POSTOP FOLLOW-UP VISIT: CPT

## 2024-05-28 RX ORDER — HYDROCODONE BITARTRATE AND ACETAMINOPHEN 10; 325 MG/1; MG/1
10-325 TABLET ORAL
Qty: 60 | Refills: 0 | Status: ACTIVE | COMMUNITY
Start: 2024-05-28 | End: 1900-01-01

## 2024-05-28 RX ORDER — HYDROCODONE/ACETAMINOPHEN 5 MG-500MG
CAPSULE ORAL
Refills: 0 | Status: ACTIVE | COMMUNITY

## 2024-05-28 RX ORDER — OXYCODONE HYDROCHLORIDE 10 MG/1
10 TABLET, FILM COATED, EXTENDED RELEASE ORAL
Qty: 60 | Refills: 0 | Status: ACTIVE | COMMUNITY
Start: 2024-05-28 | End: 1900-01-01

## 2024-05-29 NOTE — HISTORY OF PRESENT ILLNESS
[Procedure: ___] : status post [unfilled] [___ Weeks Post Op] : [unfilled] weeks post op [2] : the patient reports pain that is 2/10 in severity [de-identified] : Komal Moses is here for follow up regarding her focal lumbar pelvic reconstruction.   [de-identified] : She does have some kyphotic deformity of her adjacent segment, including the uppermost instrumented vertebrae, as well as the adjacent vertebrae, despite cement and tethering.  [de-identified] : She is still having pain, six weeks post-operative. I reassured her that this is normal. Regarding her imaging, I'm optimistic this will stabilize. She is able to walk both with a walker and without. She will continue her regimen of extended release oxycodone, 10mg, hydrocodone, 10mg PRN, and follow up in six weeks time. I also discussed her option of a rolling walker and gave her the information regarding a nitro rolling walker. She'll follow up in six weeks.

## 2024-05-29 NOTE — END OF VISIT
[FreeTextEntry3] : I Gianna Hathaway, acting as scribe, attest that this documentation has been prepared under the direction and in the presence of Provider Robi Salguero MD.   I was physically present during the service to the patient and/or personally examined the patient and I was directly involved in the management plan and recommendations of the care provided to the patient.   I Dr. Salguero, reviewed the history, the physical exam, and plan as documented by the PA. The documentation recorded by the PA, in my presence, accurately reflects the service I personally performed, and the decisions made by me with my edits as appropriate.  Robi Salguero MD  Documented by Moon Dela Cruz acting as a scribe for Dr. Robi Salguero.    All medical record entries made by the Scribe were at my, Dr. Robi Salguero. direction and personally dictated by me on . I have reviewed the chart and agree that the record accurately reflects my personal performance of the history, physical exam, assessment and plan. I have also personally directed, reviewed, and agreed with the chart.

## 2024-06-18 ENCOUNTER — APPOINTMENT (OUTPATIENT)
Dept: ORTHOPEDIC SURGERY | Facility: CLINIC | Age: 84
End: 2024-06-18
Payer: MEDICARE

## 2024-06-18 DIAGNOSIS — S22.000A WEDGE COMPRESSION FRACTURE OF UNSPECIFIED THORACIC VERTEBRA, INITIAL ENCOUNTER FOR CLOSED FRACTURE: ICD-10-CM

## 2024-06-18 PROCEDURE — 72170 X-RAY EXAM OF PELVIS: CPT

## 2024-06-18 PROCEDURE — 72080 X-RAY EXAM THORACOLMB 2/> VW: CPT

## 2024-06-18 PROCEDURE — 99212 OFFICE O/P EST SF 10 MIN: CPT

## 2024-06-19 NOTE — HISTORY OF PRESENT ILLNESS
[de-identified] : 83F presents with complaint of Rt flank pain x 1.5-2 weeks that is atraumatic. She reports she woke up with it. She takes oxycontin q 12 hours and prn vicodin. She reports that only 1 hour after oxycontin she needs to take a vicodin 2/2 pain.

## 2024-06-19 NOTE — ASSESSMENT
[FreeTextEntry1] : 83F with history of large thoracolumbar fusion presents today with c/o atraumatic Rt flank pain x 1.5-2 weeks. On XR she has evidence of thoracic compression fracture proximal to her fusion.

## 2024-06-19 NOTE — PLAN
[TextEntry] : -TFESI Rt T8-9 with Dr. Clark - continued oxycontin and hydrocodone as prescribed - continue f/u with pain management   Patient expressed understanding and agreed with plan.

## 2024-07-16 ENCOUNTER — APPOINTMENT (OUTPATIENT)
Dept: ORTHOPEDIC SURGERY | Facility: CLINIC | Age: 84
End: 2024-07-16
Payer: MEDICARE

## 2024-07-16 DIAGNOSIS — Z98.890 OTHER SPECIFIED POSTPROCEDURAL STATES: ICD-10-CM

## 2024-07-16 DIAGNOSIS — R29.898 OTHER SYMPTOMS AND SIGNS INVOLVING THE MUSCULOSKELETAL SYSTEM: ICD-10-CM

## 2024-07-16 DIAGNOSIS — M54.50 LOW BACK PAIN, UNSPECIFIED: ICD-10-CM

## 2024-07-16 DIAGNOSIS — S22.000D WEDGE COMPRESSION FRACTURE OF UNSPECIFIED THORACIC VERTEBRA, SUBSEQUENT ENCOUNTER FOR FRACTURE WITH ROUTINE HEALING: ICD-10-CM

## 2024-07-16 PROCEDURE — 72080 X-RAY EXAM THORACOLMB 2/> VW: CPT

## 2024-07-16 PROCEDURE — 99212 OFFICE O/P EST SF 10 MIN: CPT

## 2024-07-17 ENCOUNTER — RESULT REVIEW (OUTPATIENT)
Age: 84
End: 2024-07-17

## 2024-07-17 DIAGNOSIS — M79.18 MYALGIA, OTHER SITE: ICD-10-CM

## 2024-09-10 ENCOUNTER — APPOINTMENT (OUTPATIENT)
Dept: ORTHOPEDIC SURGERY | Facility: CLINIC | Age: 84
End: 2024-09-10
Payer: MEDICARE

## 2024-09-10 VITALS
BODY MASS INDEX: 21.69 KG/M2 | HEART RATE: 80 BPM | DIASTOLIC BLOOD PRESSURE: 74 MMHG | WEIGHT: 135 LBS | SYSTOLIC BLOOD PRESSURE: 147 MMHG | HEIGHT: 66 IN | OXYGEN SATURATION: 98 %

## 2024-09-10 DIAGNOSIS — M54.50 LOW BACK PAIN, UNSPECIFIED: ICD-10-CM

## 2024-09-10 DIAGNOSIS — Z98.890 OTHER SPECIFIED POSTPROCEDURAL STATES: ICD-10-CM

## 2024-09-10 DIAGNOSIS — S22.000D WEDGE COMPRESSION FRACTURE OF UNSPECIFIED THORACIC VERTEBRA, SUBSEQUENT ENCOUNTER FOR FRACTURE WITH ROUTINE HEALING: ICD-10-CM

## 2024-09-10 PROCEDURE — 99212 OFFICE O/P EST SF 10 MIN: CPT

## 2024-09-10 PROCEDURE — 72080 X-RAY EXAM THORACOLMB 2/> VW: CPT

## 2024-09-11 NOTE — HISTORY OF PRESENT ILLNESS
[de-identified] : Komal Moses is here for follow-up following her thoracolumbar pelvic reconstruction. She's pleased with her surgical result. She has minimal pain. She has intermittent severe rib pain, which resolves quickly with half a hydrocodone pill. It's predominantly on the right side. She can also feel the prominent hardware proximally. She has lost 20 pounds in surgery. Her  recently  of a massive heart attack. She has hip abductor weakness, which is most likely due to the hip replacement and avulsion of her trochanteric musculature, as well as weakness from her chronic spinal disease, including stenosis and multiple reconstructions. 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 She did have spinal cord compression, so there is a component of residual spinal cord damage, including myomalasia and proprioceptive loss.

## 2024-09-11 NOTE — HISTORY OF PRESENT ILLNESS
[de-identified] : Komal Moses is here for follow-up following her thoracolumbar pelvic reconstruction. She's pleased with her surgical result. She has minimal pain. She has intermittent severe rib pain, which resolves quickly with half a hydrocodone pill. It's predominantly on the right side. She can also feel the prominent hardware proximally. She has lost 20 pounds in surgery. Her  recently  of a massive heart attack. She has hip abductor weakness, which is most likely due to the hip replacement and avulsion of her trochanteric musculature, as well as weakness from her chronic spinal disease, including stenosis and multiple reconstructions. 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 She did have spinal cord compression, so there is a component of residual spinal cord damage, including myomalasia and proprioceptive loss.

## 2024-09-11 NOTE — PLAN
[TextEntry] : She was given a new prescription for physiotherapy. They can do everything, including strengthening of her upper extremities, lower extremities, as well as work on her posture. They can email us or call us if they have any questions. She was given a new prescription, as well as an email address. She may stop with the bone stimulator. 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 She is not interested in further surgery at this time due to her positive symptoms. She'll follow up with us in six months time.

## 2024-09-11 NOTE — END OF VISIT
[FreeTextEntry3] : Documented by Kelly Montana acting as a scribe for Robi Salguero on 09/10/2024   All medical record entries made by the Scribe were at my, Dr. Robi Salguero direction and personally dictated by me on 09/10/2024 . I have reviewed the chart and agree that the record accurately reflects my personal performance of the history, physical exam, assessment and plan. I have also personally directed, reviewed, and agreed with the chart.

## 2024-09-11 NOTE — HISTORY OF PRESENT ILLNESS
[de-identified] : Komal Moses is here for follow-up following her thoracolumbar pelvic reconstruction. She's pleased with her surgical result. She has minimal pain. She has intermittent severe rib pain, which resolves quickly with half a hydrocodone pill. It's predominantly on the right side. She can also feel the prominent hardware proximally. She has lost 20 pounds in surgery. Her  recently  of a massive heart attack. She has hip abductor weakness, which is most likely due to the hip replacement and avulsion of her trochanteric musculature, as well as weakness from her chronic spinal disease, including stenosis and multiple reconstructions. 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 She did have spinal cord compression, so there is a component of residual spinal cord damage, including myomalasia and proprioceptive loss.

## 2024-09-11 NOTE — ADDENDUM
[FreeTextEntry1] : I, Kelly Montana (scribe) assisted in filling out this chart under the dictation of Robi Salguero on 09/10/2024

## 2024-11-05 ENCOUNTER — APPOINTMENT (OUTPATIENT)
Dept: ORTHOPEDIC SURGERY | Facility: CLINIC | Age: 84
End: 2024-11-05
Payer: MEDICARE

## 2024-11-05 VITALS
DIASTOLIC BLOOD PRESSURE: 78 MMHG | SYSTOLIC BLOOD PRESSURE: 150 MMHG | RESPIRATION RATE: 79 BRPM | OXYGEN SATURATION: 100 %

## 2024-11-05 DIAGNOSIS — M54.50 LOW BACK PAIN, UNSPECIFIED: ICD-10-CM

## 2024-11-05 DIAGNOSIS — M54.16 RADICULOPATHY, LUMBAR REGION: ICD-10-CM

## 2024-11-05 PROCEDURE — 99212 OFFICE O/P EST SF 10 MIN: CPT

## 2024-11-05 PROCEDURE — 72080 X-RAY EXAM THORACOLMB 2/> VW: CPT

## 2024-11-08 ENCOUNTER — INPATIENT (INPATIENT)
Facility: HOSPITAL | Age: 84
LOS: 6 days | Discharge: NURSING, REHAB OR LONG-TERM CARE | End: 2024-11-15
Payer: MEDICARE

## 2024-11-08 ENCOUNTER — RESULT REVIEW (OUTPATIENT)
Age: 84
End: 2024-11-08

## 2024-11-08 VITALS
HEART RATE: 82 BPM | DIASTOLIC BLOOD PRESSURE: 96 MMHG | RESPIRATION RATE: 16 BRPM | TEMPERATURE: 98 F | SYSTOLIC BLOOD PRESSURE: 159 MMHG | OXYGEN SATURATION: 98 %

## 2024-11-08 DIAGNOSIS — Z98.890 OTHER SPECIFIED POSTPROCEDURAL STATES: Chronic | ICD-10-CM

## 2024-11-08 DIAGNOSIS — Z98.49 CATARACT EXTRACTION STATUS, UNSPECIFIED EYE: Chronic | ICD-10-CM

## 2024-11-08 DIAGNOSIS — Z96.643 PRESENCE OF ARTIFICIAL HIP JOINT, BILATERAL: Chronic | ICD-10-CM

## 2024-11-08 DIAGNOSIS — D05.12 INTRADUCTAL CARCINOMA IN SITU OF LEFT BREAST: Chronic | ICD-10-CM

## 2024-11-08 LAB
ANION GAP SERPL CALC-SCNC: 13 MMOL/L — SIGNIFICANT CHANGE UP (ref 5–17)
APPEARANCE UR: CLEAR — SIGNIFICANT CHANGE UP
BASOPHILS # BLD AUTO: 0 K/UL — SIGNIFICANT CHANGE UP (ref 0–0.2)
BASOPHILS NFR BLD AUTO: 0 % — SIGNIFICANT CHANGE UP (ref 0–2)
BILIRUB UR-MCNC: NEGATIVE — SIGNIFICANT CHANGE UP
BLD GP AB SCN SERPL QL: POSITIVE — SIGNIFICANT CHANGE UP
BUN SERPL-MCNC: 28 MG/DL — HIGH (ref 7–23)
CALCIUM SERPL-MCNC: 10 MG/DL — SIGNIFICANT CHANGE UP (ref 8.4–10.5)
CHLORIDE SERPL-SCNC: 105 MMOL/L — SIGNIFICANT CHANGE UP (ref 96–108)
CO2 SERPL-SCNC: 20 MMOL/L — LOW (ref 22–31)
COLOR SPEC: YELLOW — SIGNIFICANT CHANGE UP
CREAT SERPL-MCNC: 1.03 MG/DL — SIGNIFICANT CHANGE UP (ref 0.5–1.3)
DIFF PNL FLD: NEGATIVE — SIGNIFICANT CHANGE UP
EGFR: 54 ML/MIN/1.73M2 — LOW
EOSINOPHIL # BLD AUTO: 0 K/UL — SIGNIFICANT CHANGE UP (ref 0–0.5)
EOSINOPHIL NFR BLD AUTO: 0 % — SIGNIFICANT CHANGE UP (ref 0–6)
GLUCOSE SERPL-MCNC: 141 MG/DL — HIGH (ref 70–99)
GLUCOSE UR QL: NEGATIVE MG/DL — SIGNIFICANT CHANGE UP
HCT VFR BLD CALC: 37.8 % — SIGNIFICANT CHANGE UP (ref 34.5–45)
HGB BLD-MCNC: 12.1 G/DL — SIGNIFICANT CHANGE UP (ref 11.5–15.5)
IMM GRANULOCYTES NFR BLD AUTO: 0.4 % — SIGNIFICANT CHANGE UP (ref 0–0.9)
KETONES UR-MCNC: NEGATIVE MG/DL — SIGNIFICANT CHANGE UP
LEUKOCYTE ESTERASE UR-ACNC: NEGATIVE — SIGNIFICANT CHANGE UP
LYMPHOCYTES # BLD AUTO: 0.53 K/UL — LOW (ref 1–3.3)
LYMPHOCYTES # BLD AUTO: 10.8 % — LOW (ref 13–44)
MCHC RBC-ENTMCNC: 30 PG — SIGNIFICANT CHANGE UP (ref 27–34)
MCHC RBC-ENTMCNC: 32 G/DL — SIGNIFICANT CHANGE UP (ref 32–36)
MCV RBC AUTO: 93.6 FL — SIGNIFICANT CHANGE UP (ref 80–100)
MONOCYTES # BLD AUTO: 0.03 K/UL — SIGNIFICANT CHANGE UP (ref 0–0.9)
MONOCYTES NFR BLD AUTO: 0.6 % — LOW (ref 2–14)
NEUTROPHILS # BLD AUTO: 4.31 K/UL — SIGNIFICANT CHANGE UP (ref 1.8–7.4)
NEUTROPHILS NFR BLD AUTO: 88.2 % — HIGH (ref 43–77)
NITRITE UR-MCNC: NEGATIVE — SIGNIFICANT CHANGE UP
NRBC # BLD: 0 /100 WBCS — SIGNIFICANT CHANGE UP (ref 0–0)
PH UR: 5.5 — SIGNIFICANT CHANGE UP (ref 5–8)
PLATELET # BLD AUTO: 167 K/UL — SIGNIFICANT CHANGE UP (ref 150–400)
POTASSIUM SERPL-MCNC: 4.7 MMOL/L — SIGNIFICANT CHANGE UP (ref 3.5–5.3)
POTASSIUM SERPL-SCNC: 4.7 MMOL/L — SIGNIFICANT CHANGE UP (ref 3.5–5.3)
PROT UR-MCNC: NEGATIVE MG/DL — SIGNIFICANT CHANGE UP
RBC # BLD: 4.04 M/UL — SIGNIFICANT CHANGE UP (ref 3.8–5.2)
RBC # FLD: 14.4 % — SIGNIFICANT CHANGE UP (ref 10.3–14.5)
RH IG SCN BLD-IMP: POSITIVE — SIGNIFICANT CHANGE UP
SODIUM SERPL-SCNC: 138 MMOL/L — SIGNIFICANT CHANGE UP (ref 135–145)
SP GR SPEC: >1.03 — HIGH (ref 1–1.03)
UROBILINOGEN FLD QL: 0.2 MG/DL — SIGNIFICANT CHANGE UP (ref 0.2–1)
WBC # BLD: 4.89 K/UL — SIGNIFICANT CHANGE UP (ref 3.8–10.5)
WBC # FLD AUTO: 4.89 K/UL — SIGNIFICANT CHANGE UP (ref 3.8–10.5)

## 2024-11-08 PROCEDURE — 99222 1ST HOSP IP/OBS MODERATE 55: CPT

## 2024-11-08 PROCEDURE — 71045 X-RAY EXAM CHEST 1 VIEW: CPT | Mod: 26

## 2024-11-08 PROCEDURE — 86077 PHYS BLOOD BANK SERV XMATCH: CPT

## 2024-11-08 PROCEDURE — 93306 TTE W/DOPPLER COMPLETE: CPT | Mod: 26

## 2024-11-08 RX ORDER — POLYETHYLENE GLYCOL 3350 17 G/17G
17 POWDER, FOR SOLUTION ORAL DAILY
Refills: 0 | Status: DISCONTINUED | OUTPATIENT
Start: 2024-11-08 | End: 2024-11-11

## 2024-11-08 RX ORDER — VALSARTAN 160 MG/1
160 TABLET ORAL
Refills: 0 | Status: DISCONTINUED | OUTPATIENT
Start: 2024-11-08 | End: 2024-11-08

## 2024-11-08 RX ORDER — SENNA 187 MG
2 TABLET ORAL AT BEDTIME
Refills: 0 | Status: DISCONTINUED | OUTPATIENT
Start: 2024-11-08 | End: 2024-11-11

## 2024-11-08 RX ORDER — DILTIAZEM HCL 5 MG/ML
120 VIAL (ML) INTRAVENOUS DAILY
Refills: 0 | Status: DISCONTINUED | OUTPATIENT
Start: 2024-11-08 | End: 2024-11-11

## 2024-11-08 RX ORDER — HYDROMORPHONE HCL/0.9% NACL/PF 6 MG/30 ML
0.5 PATIENT CONTROLLED ANALGESIA SYRINGE INTRAVENOUS EVERY 6 HOURS
Refills: 0 | Status: DISCONTINUED | OUTPATIENT
Start: 2024-11-08 | End: 2024-11-11

## 2024-11-08 RX ORDER — OXYCODONE HYDROCHLORIDE 30 MG/1
10 TABLET ORAL EVERY 6 HOURS
Refills: 0 | Status: DISCONTINUED | OUTPATIENT
Start: 2024-11-08 | End: 2024-11-11

## 2024-11-08 RX ORDER — HYDROXYCHLOROQUINE SULFATE 200 MG
200 TABLET ORAL
Refills: 0 | Status: DISCONTINUED | OUTPATIENT
Start: 2024-11-08 | End: 2024-11-11

## 2024-11-08 RX ORDER — DULOXETINE HYDROCHLORIDE 30 MG/1
30 CAPSULE, DELAYED RELEASE ORAL DAILY
Refills: 0 | Status: DISCONTINUED | OUTPATIENT
Start: 2024-11-08 | End: 2024-11-11

## 2024-11-08 RX ORDER — ALBUTEROL 90 MCG
2 AEROSOL (GRAM) INHALATION EVERY 6 HOURS
Refills: 0 | Status: DISCONTINUED | OUTPATIENT
Start: 2024-11-08 | End: 2024-11-11

## 2024-11-08 RX ORDER — METHOCARBAMOL 500 MG/1
500 TABLET ORAL EVERY 8 HOURS
Refills: 0 | Status: DISCONTINUED | OUTPATIENT
Start: 2024-11-08 | End: 2024-11-11

## 2024-11-08 RX ORDER — CHLORHEXIDINE GLUCONATE 40 MG/ML
1 SOLUTION TOPICAL EVERY 12 HOURS
Refills: 0 | Status: COMPLETED | OUTPATIENT
Start: 2024-11-08 | End: 2024-11-08

## 2024-11-08 RX ORDER — VALSARTAN 160 MG/1
80 TABLET ORAL
Refills: 0 | Status: DISCONTINUED | OUTPATIENT
Start: 2024-11-08 | End: 2024-11-10

## 2024-11-08 RX ORDER — PANTOPRAZOLE SODIUM 40 MG/1
40 TABLET, DELAYED RELEASE ORAL
Refills: 0 | Status: DISCONTINUED | OUTPATIENT
Start: 2024-11-08 | End: 2024-11-11

## 2024-11-08 RX ORDER — OXYCODONE HYDROCHLORIDE 30 MG/1
5 TABLET ORAL EVERY 6 HOURS
Refills: 0 | Status: DISCONTINUED | OUTPATIENT
Start: 2024-11-08 | End: 2024-11-11

## 2024-11-08 RX ADMIN — DULOXETINE HYDROCHLORIDE 30 MILLIGRAM(S): 30 CAPSULE, DELAYED RELEASE ORAL at 17:16

## 2024-11-08 RX ADMIN — CHLORHEXIDINE GLUCONATE 1 APPLICATION(S): 40 SOLUTION TOPICAL at 06:39

## 2024-11-08 RX ADMIN — PANTOPRAZOLE SODIUM 40 MILLIGRAM(S): 40 TABLET, DELAYED RELEASE ORAL at 05:26

## 2024-11-08 RX ADMIN — METHOCARBAMOL 500 MILLIGRAM(S): 500 TABLET ORAL at 22:22

## 2024-11-08 RX ADMIN — Medication 200 MILLIGRAM(S): at 18:56

## 2024-11-08 RX ADMIN — VALSARTAN 80 MILLIGRAM(S): 160 TABLET ORAL at 07:02

## 2024-11-08 RX ADMIN — Medication 50 MILLILITER(S): at 02:42

## 2024-11-08 RX ADMIN — CHLORHEXIDINE GLUCONATE 1 APPLICATION(S): 40 SOLUTION TOPICAL at 17:17

## 2024-11-08 RX ADMIN — VALSARTAN 80 MILLIGRAM(S): 160 TABLET ORAL at 17:16

## 2024-11-08 RX ADMIN — METHOCARBAMOL 500 MILLIGRAM(S): 500 TABLET ORAL at 05:26

## 2024-11-08 RX ADMIN — Medication 80 MILLIGRAM(S): at 22:22

## 2024-11-08 RX ADMIN — Medication 120 MILLIGRAM(S): at 07:25

## 2024-11-08 NOTE — H&P ADULT - HISTORY OF PRESENT ILLNESS
84F s/p T9-Pelvis PSF c/b dural tear (4/2024) presents with increasing LBP with bilateral weaknesss upon ambulation for the past 5 days. Since the surgery, she had been free of back pain and ambulating well with cane, continuing PT. However, since 5 days ago, has been complaining of bilateral leg weakness when ambulating. Denies any falls/injuries/trauma. No urinary or bowel incontinence.    Presented to the ED at [facility], where MRI L showed bulging disc compressing spinal cord and was told by the ED that it is emergency and could become paralyzed if not treated immediately. Hence, was transported here to [facility] under Dr. Salguero.

## 2024-11-08 NOTE — H&P ADULT - HIV OFFER
"  Subjective:     History was provided by the patient and mother.  Orly William is a 8 y.o. male here for ADHD evaluation    HPI: Orly has a lifelong history of increased motor activity with additional behaviors that include inattention. Orly is reported to have a pattern of school difficulties.    Patient has always had trouble and sitting still but now that he is older / further in school it has begun to affect his school performance. Uncle had ADHD    Patient is currently in 1st grade at [employer].     Past Medical History   I have reviewed patient's past medical history and it is pertinent for:  There are no problems to display for this patient.       A comprehensive review of symptoms was completed and negative except as noted above.          Objective:    /69 (BP Location: Left arm, Patient Position: Sitting, BP Method: Small (Automatic))   Pulse 78   Ht 4' 2" (1.27 m)   Wt 26.2 kg (57 lb 13.9 oz)   BMI 16.27 kg/m²   Physical Exam  Constitutional:       General: He is active.   Neurological:      Mental Status: He is alert.           Assessment:   Encounter for well child visit with abnormal findings    Behavior concern  -     Nursing communication    Mouth breathing  -     Ambulatory referral/consult to Pediatric ENT; Future; Expected date: 01/16/2024    Snoring  -     Ambulatory referral/consult to Pediatric ENT; Future; Expected date: 01/16/2024       Plan:   1.  Will distribute [employer] forms to pt family/teachers. Discussed with mom that getting a diagnosis of ADHD may be helpful in attaining accommodations and if meeting diagnostic criteria, can consider medication management    " Opt out

## 2024-11-08 NOTE — H&P ADULT - ASSESSMENT
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  - Pain control  - Given 5/5 motor and sensation intact with no urinary or bowel incontinence, not concerning for cord compression or cauda equina  - Has CD for MRI C/T spine from outpatient; pending read  - Pending final plan from Dr. 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  - Pain control  - Given 5/5 motor and sensation intact with no urinary or bowel incontinence, not concerning for cauda equina  - Has CD for MRI C/T/L spine from outpatient- showing PJK at T8-9 with disc bulge at T8-9 compressing on the cord  - Pending final plan from Dr. Salguero

## 2024-11-08 NOTE — PROGRESS NOTE ADULT - SUBJECTIVE AND OBJECTIVE BOX
Ortho Note    Patient seen and examined at bedside. Pt reports she has no pain at rest but has been feeling muscle weakness and difficulty with ambulation for the past 5 days. She reports her prior surgery in April 2024 had an uncomplicated post operative course.   Denies CP, SOB, N/V, new numbness/tingling     Vital Signs Last 24 Hrs  T(C): 36.6 (11-08-24 @ 08:56), Max: 36.6 (11-08-24 @ 08:56)  T(F): 97.8 (11-08-24 @ 08:56), Max: 97.8 (11-08-24 @ 08:56)  HR: 81 (11-08-24 @ 08:56) (81 - 81)  BP: 160/84 (11-08-24 @ 08:56) (150/84 - 161/80)  BP(mean): --  RR: 16 (11-08-24 @ 08:56) (16 - 16)  SpO2: 99% (11-08-24 @ 08:56) (99% - 99%)  I&O's Summary      General: Pt Alert and oriented, NAD  Pulses: 2+ DP pulses palpable bilaterally, skin wwp, cap refill brisk, no calf tenderness bilaterally  Sensation: SILT to L2-S1 dermatomes bilaterally  Motor:  Quad/Hamstrings/EHL/FHL/TA/GS 5/5 bilaterally                            12.1   4.89  )-----------( 167      ( 08 Nov 2024 05:30 )             37.8     11-08    138  |  105  |  28[H]  ----------------------------<  141[H]  4.7   |  20[L]  |  1.03    Ca    10.0      08 Nov 2024 05:30        A/P: 83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 Plan for Extension of fusion T3-prior and T8-T10 laminectomy on Monday 11/11.   - Stable, medicine consulted for pre-op optimization. Echo ordered, will f/u results  - Pain Control  - OOB/IS  - Bowel Regimen  - DVT ppx: SCDs  - PT, WBS: WBAT  - Dispo: OR on Monday 11/11     Ortho Pager [phone]

## 2024-11-08 NOTE — CONSULT NOTE ADULT - SUBJECTIVE AND OBJECTIVE BOX
See below for orthopedic HPI  "84F s/p T9-Pelvis PSF c/b dural tear (4/2024) presents with increasing LBP with bilateral weaknesss upon ambulation for the past 5 days. Since the surgery, she had been free of back pain and ambulating well with cane, continuing PT. However, since 5 days ago, has been complaining of bilateral leg weakness when ambulating. Denies any falls/injuries/trauma. No urinary or bowel incontinence.    Presented to the ED at [facility], where MRI L showed bulging disc compressing spinal cord and was told by the ED that it is emergency and could become paralyzed if not treated immediately. Hence, was transported here to [facility] under Dr. Salguero.      Review of Systems:  Other Review of Systems: All other review of systems negative, except as noted in HPI      Allergies and Intolerances:        Allergies:  	penicillin: Drug, Unknown  	venlafaxine: Drug, Unknown    Home Medications:   * Patient Currently Takes Medications as of 19-Apr-2024 14:30 documented in Structured Notes  · 	enoxaparin 40 mg/0.4 mL injectable solution: 40 milligram(s) injectable once a day for DVT prophylaxis while at rehab.  · 	pantoprazole 40 mg oral delayed release tablet: 1 tab(s) orally once a day (before a meal)  · 	methocarbamol 500 mg oral tablet: 1 tab(s) orally every 8 hours  · 	polyethylene glycol 3350 oral powder for reconstitution: 17 gram(s) orally once a day  · 	bisacodyl 10 mg rectal suppository: 1 suppository(ies) rectal once a day As needed Constipation  · 	senna leaf extract oral tablet: 2 tab(s) orally once a day (at bedtime)  · 	acetaminophen 500 mg oral tablet: 2 tab(s) orally every 8 hours  · 	DULoxetine 60 mg oral delayed release capsule: 1 cap(s) orally once a day  · 	DilTIAZem (Eqv-Cardizem CD) 120 mg/24 hours oral capsule, extended release: 1 cap(s) orally 2 times a day  · 	aspirin 81 mg oral capsule: 1 cap(s) orally once a day can resume Saturday 4/20  · 	Albuterol (Eqv-ProAir HFA) 90 mcg/inh inhalation aerosol: 2 puff(s) inhaled every 6 hours as needed for PRN wheezing  · 	hydroxychloroquine 200 mg oral tablet: 1 tab(s) orally 2 times a day  · 	Lipitor 80 mg oral tablet: 1 tab(s) orally once a day  · 	valsartan 160 mg oral tablet: 1 tab(s) orally 2 times a day  · 	DULoxetine 30 mg oral delayed release capsule: 1 cap(s) orally once a day    .    Patient History:   Social History:  · Substance use	No    Tobacco Screening:  · Core Measure Site	No    "    Vital Signs Last 24 Hrs  T(C): 36.6 (08 Nov 2024 08:56), Max: 36.9 (08 Nov 2024 00:15)  T(F): 97.8 (08 Nov 2024 08:56), Max: 98.4 (08 Nov 2024 00:15)  HR: 81 (08 Nov 2024 08:56) (81 - 84)  BP: 160/84 (08 Nov 2024 08:56) (150/84 - 161/80)  BP(mean): --  RR: 16 (08 Nov 2024 08:56) (16 - 18)  SpO2: 99% (08 Nov 2024 08:56) (97% - 99%)    Parameters below as of 08 Nov 2024 08:56  Patient On (Oxygen Delivery Method): room air    Physical exam  General: nad, sitting up in bed  heent: ncat, mmm  cards: rrr, normal s1s2, no mrg  pulm: ctab, no wheeze, crackles, rales or rhonchi  ab: soft, ntnd, normoactive bowel sounds  ext: wwp, no edema, asymmetry or tenderness  neuro: speech fluent, no facial asymmetry    Additional history- she states that she has chronic shortness of breath, but is able to go to the gym and exert herself without issue.  She has seen a pulmonologist about the issue, and then follows with a cardiologist.  Recently saw the cardiologist for her breathing about 3 weeks ago, and that visit was reassuring and no additional testing ordered.  At the hospital she was at before transfer, troponins were negative, CTA negative for PE, dopplers negative for DVT, EKG nonischemic.  She has never had a heart attack or stents placed.

## 2024-11-08 NOTE — CONSULT NOTE ADULT - ASSESSMENT
84F with PMH of arthritis, HTN, CAD, CKD3, erosive osteoarthritis, asthma and anxiety presenting as transfer from OSH for ortho evaluation due to concern for cord compression.     #Pre-operative clearance  - able to exercise without dyspnea, but has somewhat chronic shortness of breath  - recommend TTE, as last TTE was earlier this year  - EKG NSR, normal ME interval and QTC, no KATHLEEN  - troponin at outside hospital negative  - no PE on CTA  - dopplers negative    #CAD  - no history of MI or stents  - EKG nonischemic  - continue statin     #HTN  - hold losartan prior to OR    #Asthma  - clear lungs, no acute exacerbation - albuterol inhaler PRN    #Erosive OA  - continue home dose of plaquenil    75 minutes spent on this encounter, including face to face with patient, care coordination and documentation.  Plan discussed with orthopedic team.

## 2024-11-08 NOTE — PATIENT PROFILE ADULT - FALL HARM RISK - RISK INTERVENTIONS

## 2024-11-08 NOTE — H&P ADULT - NSHPPHYSICALEXAM_GEN_ALL_CORE
VS: stable, reviewed per nursing documentation  General: No acute distress, resting comfortably  Neuro: Alert, oriented x 3  Psych: Normal mood & affect  HEENT: normocephalic, atraumatic   Neck: trachea midline  Respiratory: nonlabored on room air   CV: no cyanosis, no peripheral edema   GI: nontender, nondistended   Skin: POsterior spine incision healed, with no erythema, drainage, dehiscence.  MSK: atraumatic with exception of below  RLE LLE    -Motor: Quadriceps/TA/GS/EHL/FHL 5/5 bilateral lower extremities     -Sensation:  intact to light touch bilateral lower extremities    -Pulses: 2+ DP/PT pulses bilaterally, symmetric, extremities warm and    well perfused, capillary refill brisk

## 2024-11-08 NOTE — PATIENT PROFILE ADULT - FUNCTIONAL ASSESSMENT - BASIC MOBILITY 6.
3-calculated by average/Not able to assess (calculate score using [facility] averaging method)

## 2024-11-09 LAB
ANION GAP SERPL CALC-SCNC: 9 MMOL/L — SIGNIFICANT CHANGE UP (ref 5–17)
BUN SERPL-MCNC: 38 MG/DL — HIGH (ref 7–23)
CALCIUM SERPL-MCNC: 9 MG/DL — SIGNIFICANT CHANGE UP (ref 8.4–10.5)
CHLORIDE SERPL-SCNC: 108 MMOL/L — SIGNIFICANT CHANGE UP (ref 96–108)
CO2 SERPL-SCNC: 23 MMOL/L — SIGNIFICANT CHANGE UP (ref 22–31)
CREAT SERPL-MCNC: 1.26 MG/DL — SIGNIFICANT CHANGE UP (ref 0.5–1.3)
EGFR: 43 ML/MIN/1.73M2 — LOW
GLUCOSE SERPL-MCNC: 126 MG/DL — HIGH (ref 70–99)
HCT VFR BLD CALC: 34.4 % — LOW (ref 34.5–45)
HGB BLD-MCNC: 10.5 G/DL — LOW (ref 11.5–15.5)
MCHC RBC-ENTMCNC: 30 PG — SIGNIFICANT CHANGE UP (ref 27–34)
MCHC RBC-ENTMCNC: 30.5 G/DL — LOW (ref 32–36)
MCV RBC AUTO: 98.3 FL — SIGNIFICANT CHANGE UP (ref 80–100)
NRBC # BLD: 0 /100 WBCS — SIGNIFICANT CHANGE UP (ref 0–0)
PLATELET # BLD AUTO: 152 K/UL — SIGNIFICANT CHANGE UP (ref 150–400)
POTASSIUM SERPL-MCNC: 4.9 MMOL/L — SIGNIFICANT CHANGE UP (ref 3.5–5.3)
POTASSIUM SERPL-SCNC: 4.9 MMOL/L — SIGNIFICANT CHANGE UP (ref 3.5–5.3)
RBC # BLD: 3.5 M/UL — LOW (ref 3.8–5.2)
RBC # FLD: 14.6 % — HIGH (ref 10.3–14.5)
SODIUM SERPL-SCNC: 140 MMOL/L — SIGNIFICANT CHANGE UP (ref 135–145)
WBC # BLD: 8.03 K/UL — SIGNIFICANT CHANGE UP (ref 3.8–10.5)
WBC # FLD AUTO: 8.03 K/UL — SIGNIFICANT CHANGE UP (ref 3.8–10.5)

## 2024-11-09 PROCEDURE — 99233 SBSQ HOSP IP/OBS HIGH 50: CPT

## 2024-11-09 RX ORDER — TRAZODONE HYDROCHLORIDE 100 MG/1
25 TABLET ORAL AT BEDTIME
Refills: 0 | Status: DISCONTINUED | OUTPATIENT
Start: 2024-11-09 | End: 2024-11-11

## 2024-11-09 RX ORDER — TRAZODONE HYDROCHLORIDE 100 MG/1
25 TABLET ORAL ONCE
Refills: 0 | Status: COMPLETED | OUTPATIENT
Start: 2024-11-09 | End: 2024-11-09

## 2024-11-09 RX ADMIN — METHOCARBAMOL 500 MILLIGRAM(S): 500 TABLET ORAL at 14:38

## 2024-11-09 RX ADMIN — METHOCARBAMOL 500 MILLIGRAM(S): 500 TABLET ORAL at 06:53

## 2024-11-09 RX ADMIN — Medication 120 MILLIGRAM(S): at 06:53

## 2024-11-09 RX ADMIN — POLYETHYLENE GLYCOL 3350 17 GRAM(S): 17 POWDER, FOR SOLUTION ORAL at 12:05

## 2024-11-09 RX ADMIN — METHOCARBAMOL 500 MILLIGRAM(S): 500 TABLET ORAL at 21:08

## 2024-11-09 RX ADMIN — DULOXETINE HYDROCHLORIDE 30 MILLIGRAM(S): 30 CAPSULE, DELAYED RELEASE ORAL at 12:05

## 2024-11-09 RX ADMIN — PANTOPRAZOLE SODIUM 40 MILLIGRAM(S): 40 TABLET, DELAYED RELEASE ORAL at 06:55

## 2024-11-09 RX ADMIN — Medication 200 MILLIGRAM(S): at 18:27

## 2024-11-09 RX ADMIN — TRAZODONE HYDROCHLORIDE 25 MILLIGRAM(S): 100 TABLET ORAL at 00:44

## 2024-11-09 RX ADMIN — TRAZODONE HYDROCHLORIDE 25 MILLIGRAM(S): 100 TABLET ORAL at 21:08

## 2024-11-09 RX ADMIN — Medication 200 MILLIGRAM(S): at 06:54

## 2024-11-09 RX ADMIN — VALSARTAN 80 MILLIGRAM(S): 160 TABLET ORAL at 06:54

## 2024-11-09 RX ADMIN — Medication 80 MILLIGRAM(S): at 21:08

## 2024-11-09 NOTE — PROGRESS NOTE ADULT - SUBJECTIVE AND OBJECTIVE BOX
24HR EVENTS:     SUBJECTIVE: Pt seen and examined on morning rounds. Pain well controlled. Patient denies CP/SOB/N/V. Urinating without issue.      Vital Signs Last 24 Hrs  T(C): 36.8 (09 Nov 2024 05:10), Max: 36.9 (08 Nov 2024 17:00)  T(F): 98.2 (09 Nov 2024 05:10), Max: 98.5 (08 Nov 2024 17:00)  HR: 71 (09 Nov 2024 05:10) (71 - 81)  BP: 130/69 (09 Nov 2024 05:10) (130/69 - 160/84)  BP(mean): --  RR: 16 (09 Nov 2024 05:10) (16 - 18)  SpO2: 98% (09 Nov 2024 05:10) (95% - 99%)    Parameters below as of 09 Nov 2024 05:10  Patient On (Oxygen Delivery Method): room air        Physical Exam:  General: NAD, resting comfortably in bed    RLE:  SILT L2-S1, symmetric  Motor 5/5 L2-S1, symmetric  Pulses 2+    LLE:  SILT L2-S1, symmetric  Motor 5/5 L2-S1, symmetric  Pulses 2+      Assessment/Plan:  84yF s/p T9-Pelvis PSF (4/2024) here with worsening weakness for 5 days, 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 Plan for extension of fusion T3 to prior, lami T8-T10 on Monday 11/11  - Weight Bearing Status: WBAT  - Pain control  - DVT PPx: SCDs  - F/u AM labs  - Dispo: OR on 11/11    Jacob Lopez, PGY-1  Orthopedic Surgery

## 2024-11-09 NOTE — PROGRESS NOTE ADULT - SUBJECTIVE AND OBJECTIVE BOX
JANES TYLER 84y Female    INTERVAL EVENTS:   no acute events during interval/overnight.    SUBJECTIVE:  Patient seen and examined at bedside.    Rest of 12 point review of systems is negative except as stated above.    OBJECTIVE:  Vital Signs Last 24 Hrs  T(C): 36.7 (09 Nov 2024 09:14), Max: 36.9 (08 Nov 2024 17:00)  T(F): 98.1 (09 Nov 2024 09:14), Max: 98.5 (08 Nov 2024 17:00)  HR: 73 (09 Nov 2024 09:14) (71 - 76)  BP: 134/63 (09 Nov 2024 09:14) (130/69 - 157/73)  BP(mean): --  RR: 17 (09 Nov 2024 09:14) (16 - 18)  SpO2: 98% (09 Nov 2024 09:14) (95% - 98%)    Parameters below as of 09 Nov 2024 09:14  Patient On (Oxygen Delivery Method): room air        PHYSICAL EXAM:  General: NAD. Speaking in full sentences. Resting in bed.   HEENT: NC/AT; PERRL, clear conjunctiva  Neck: supple  Respiratory: CTA b/l, no retractions or accessory muscle use. No increased work of breathing.   Cardiovascular: +S1/S2; RRR  Abdomen: soft, NT/ND; +BS x4  Extremities: 2+ peripheral pulses b/l; no LE edema  Skin: normal color and turgor; no rash; warm and well perfused  Neurological: AAOx3. No FND noted.  Psychiatry: Mood and Affect appropriate.    LABS:                        10.5   8.03  )-----------( 152      ( 09 Nov 2024 05:30 )             34.4     11-09    140  |  108  |  38[H]  ----------------------------<  126[H]  4.9   |  23  |  1.26    Ca    9.0      09 Nov 2024 05:30          CAPILLARY BLOOD GLUCOSE        Urinalysis Basic - ( 09 Nov 2024 05:30 )    Color: x / Appearance: x / SG: x / pH: x  Gluc: 126 mg/dL / Ketone: x  / Bili: x / Urobili: x   Blood: x / Protein: x / Nitrite: x   Leuk Esterase: x / RBC: x / WBC x   Sq Epi: x / Non Sq Epi: x / Bacteria: x        MICRODATA:      RADIOLOGY/OTHER STUDIES: reviewed.      MEDICATIONS:  albuterol    90 MICROgram(s) HFA Inhaler 2 Puff(s) Inhalation every 6 hours PRN  atorvastatin 80 milliGRAM(s) Oral at bedtime  bisacodyl Suppository 10 milliGRAM(s) Rectal daily PRN  diltiazem    milliGRAM(s) Oral daily  DULoxetine 30 milliGRAM(s) Oral daily  HYDROmorphone  Injectable 0.5 milliGRAM(s) IV Push every 6 hours PRN  hydroxychloroquine 200 milliGRAM(s) Oral two times a day  lactated ringers. 1000 milliLiter(s) IV Continuous <Continuous>  methocarbamol 500 milliGRAM(s) Oral every 8 hours  oxyCODONE    IR 10 milliGRAM(s) Oral every 6 hours PRN  oxyCODONE    IR 5 milliGRAM(s) Oral every 6 hours PRN  pantoprazole    Tablet 40 milliGRAM(s) Oral before breakfast  polyethylene glycol 3350 17 Gram(s) Oral daily  senna 2 Tablet(s) Oral at bedtime  traZODone 25 milliGRAM(s) Oral at bedtime PRN  valsartan 80 milliGRAM(s) Oral two times a day    venlafaxine (Unknown)  penicillin (Unknown)   JANES TYLER 84y Female    INTERVAL EVENTS:   Echo:   1. Normal left and right ventricular size and systolic function.   2. Severely dilated left atrium.   3. No significant valvular disease.   4. No evidence of pulmonary hypertension.   5. No pericardial effusion.   6. No prior echo is available for comparison.    SUBJECTIVE:  Patient seen and examined at bedside.  Very anxious. Reports intermittent shortness of breath but not at the moment. can ambulate significant distances without sob. says it happens spontaneously sometimes while sitting and talking. Has been under a lot of stress and  recently passed away.   + back pain.   Rest of 12 point review of systems is negative except as stated above.    OBJECTIVE:  Vital Signs Last 24 Hrs  T(C): 36.7 (09 Nov 2024 09:14), Max: 36.9 (08 Nov 2024 17:00)  T(F): 98.1 (09 Nov 2024 09:14), Max: 98.5 (08 Nov 2024 17:00)  HR: 73 (09 Nov 2024 09:14) (71 - 76)  BP: 134/63 (09 Nov 2024 09:14) (130/69 - 157/73)  BP(mean): --  RR: 17 (09 Nov 2024 09:14) (16 - 18)  SpO2: 98% (09 Nov 2024 09:14) (95% - 98%)    Parameters below as of 09 Nov 2024 09:14  Patient On (Oxygen Delivery Method): room air        PHYSICAL EXAM:  General: NAD. Speaking in full sentences. Sitting up in bed.  HEENT: NC/AT; PERRL, clear conjunctiva  Neck: supple  Respiratory: CTA b/l, no retractions or accessory muscle use. No increased work of breathing.   Cardiovascular: +S1/S2; RRR  Abdomen: soft, NT/ND; +BS x4  Extremities: 2+ peripheral pulses b/l; no LE edema  Neurological: AAOx3. No FND noted.  Psychiatry: Mood and Affect appropriate.    LABS:                        10.5   8.03  )-----------( 152      ( 09 Nov 2024 05:30 )             34.4     11-09    140  |  108  |  38[H]  ----------------------------<  126[H]  4.9   |  23  |  1.26    Ca    9.0      09 Nov 2024 05:30          CAPILLARY BLOOD GLUCOSE        Urinalysis Basic - ( 09 Nov 2024 05:30 )    Color: x / Appearance: x / SG: x / pH: x  Gluc: 126 mg/dL / Ketone: x  / Bili: x / Urobili: x   Blood: x / Protein: x / Nitrite: x   Leuk Esterase: x / RBC: x / WBC x   Sq Epi: x / Non Sq Epi: x / Bacteria: x      RADIOLOGY/OTHER STUDIES: reviewed.      MEDICATIONS:  albuterol    90 MICROgram(s) HFA Inhaler 2 Puff(s) Inhalation every 6 hours PRN  atorvastatin 80 milliGRAM(s) Oral at bedtime  bisacodyl Suppository 10 milliGRAM(s) Rectal daily PRN  diltiazem    milliGRAM(s) Oral daily  DULoxetine 30 milliGRAM(s) Oral daily  HYDROmorphone  Injectable 0.5 milliGRAM(s) IV Push every 6 hours PRN  hydroxychloroquine 200 milliGRAM(s) Oral two times a day  lactated ringers. 1000 milliLiter(s) IV Continuous <Continuous>  methocarbamol 500 milliGRAM(s) Oral every 8 hours  oxyCODONE    IR 10 milliGRAM(s) Oral every 6 hours PRN  oxyCODONE    IR 5 milliGRAM(s) Oral every 6 hours PRN  pantoprazole    Tablet 40 milliGRAM(s) Oral before breakfast  polyethylene glycol 3350 17 Gram(s) Oral daily  senna 2 Tablet(s) Oral at bedtime  traZODone 25 milliGRAM(s) Oral at bedtime PRN  valsartan 80 milliGRAM(s) Oral two times a day    venlafaxine (Unknown)  penicillin (Unknown)

## 2024-11-10 DIAGNOSIS — I25.10 ATHEROSCLEROTIC HEART DISEASE OF NATIVE CORONARY ARTERY WITHOUT ANGINA PECTORIS: ICD-10-CM

## 2024-11-10 DIAGNOSIS — I51.7 CARDIOMEGALY: ICD-10-CM

## 2024-11-10 DIAGNOSIS — I10 ESSENTIAL (PRIMARY) HYPERTENSION: ICD-10-CM

## 2024-11-10 DIAGNOSIS — J45.909 UNSPECIFIED ASTHMA, UNCOMPLICATED: ICD-10-CM

## 2024-11-10 LAB
ANION GAP SERPL CALC-SCNC: 12 MMOL/L — SIGNIFICANT CHANGE UP (ref 5–17)
BLD GP AB SCN SERPL QL: POSITIVE — SIGNIFICANT CHANGE UP
BUN SERPL-MCNC: 34 MG/DL — HIGH (ref 7–23)
CALCIUM SERPL-MCNC: 9 MG/DL — SIGNIFICANT CHANGE UP (ref 8.4–10.5)
CHLORIDE SERPL-SCNC: 107 MMOL/L — SIGNIFICANT CHANGE UP (ref 96–108)
CO2 SERPL-SCNC: 23 MMOL/L — SIGNIFICANT CHANGE UP (ref 22–31)
CREAT SERPL-MCNC: 1.17 MG/DL — SIGNIFICANT CHANGE UP (ref 0.5–1.3)
EGFR: 47 ML/MIN/1.73M2 — LOW
GLUCOSE SERPL-MCNC: 83 MG/DL — SIGNIFICANT CHANGE UP (ref 70–99)
HCT VFR BLD CALC: 38 % — SIGNIFICANT CHANGE UP (ref 34.5–45)
HGB BLD-MCNC: 11.7 G/DL — SIGNIFICANT CHANGE UP (ref 11.5–15.5)
MCHC RBC-ENTMCNC: 28.7 PG — SIGNIFICANT CHANGE UP (ref 27–34)
MCHC RBC-ENTMCNC: 30.8 G/DL — LOW (ref 32–36)
MCV RBC AUTO: 93.4 FL — SIGNIFICANT CHANGE UP (ref 80–100)
NRBC # BLD: 0 /100 WBCS — SIGNIFICANT CHANGE UP (ref 0–0)
PLATELET # BLD AUTO: 195 K/UL — SIGNIFICANT CHANGE UP (ref 150–400)
POTASSIUM SERPL-MCNC: 4.3 MMOL/L — SIGNIFICANT CHANGE UP (ref 3.5–5.3)
POTASSIUM SERPL-SCNC: 4.3 MMOL/L — SIGNIFICANT CHANGE UP (ref 3.5–5.3)
RBC # BLD: 4.07 M/UL — SIGNIFICANT CHANGE UP (ref 3.8–5.2)
RBC # FLD: 14.7 % — HIGH (ref 10.3–14.5)
RH IG SCN BLD-IMP: POSITIVE — SIGNIFICANT CHANGE UP
SODIUM SERPL-SCNC: 142 MMOL/L — SIGNIFICANT CHANGE UP (ref 135–145)
WBC # BLD: 6.81 K/UL — SIGNIFICANT CHANGE UP (ref 3.8–10.5)
WBC # FLD AUTO: 6.81 K/UL — SIGNIFICANT CHANGE UP (ref 3.8–10.5)

## 2024-11-10 PROCEDURE — 99233 SBSQ HOSP IP/OBS HIGH 50: CPT

## 2024-11-10 RX ORDER — ACETAMINOPHEN 500 MG
1000 TABLET ORAL EVERY 8 HOURS
Refills: 0 | Status: DISCONTINUED | OUTPATIENT
Start: 2024-11-10 | End: 2024-11-11

## 2024-11-10 RX ADMIN — Medication 200 MILLIGRAM(S): at 05:50

## 2024-11-10 RX ADMIN — METHOCARBAMOL 500 MILLIGRAM(S): 500 TABLET ORAL at 23:33

## 2024-11-10 RX ADMIN — Medication 200 MILLIGRAM(S): at 17:54

## 2024-11-10 RX ADMIN — METHOCARBAMOL 500 MILLIGRAM(S): 500 TABLET ORAL at 13:18

## 2024-11-10 RX ADMIN — TRAZODONE HYDROCHLORIDE 25 MILLIGRAM(S): 100 TABLET ORAL at 23:34

## 2024-11-10 RX ADMIN — Medication 80 MILLIGRAM(S): at 23:34

## 2024-11-10 RX ADMIN — Medication 120 MILLIGRAM(S): at 05:50

## 2024-11-10 RX ADMIN — DULOXETINE HYDROCHLORIDE 30 MILLIGRAM(S): 30 CAPSULE, DELAYED RELEASE ORAL at 12:40

## 2024-11-10 RX ADMIN — Medication 1000 MILLIGRAM(S): at 23:34

## 2024-11-10 RX ADMIN — METHOCARBAMOL 500 MILLIGRAM(S): 500 TABLET ORAL at 05:50

## 2024-11-10 RX ADMIN — Medication 2 TABLET(S): at 23:34

## 2024-11-10 RX ADMIN — VALSARTAN 80 MILLIGRAM(S): 160 TABLET ORAL at 17:54

## 2024-11-10 RX ADMIN — PANTOPRAZOLE SODIUM 40 MILLIGRAM(S): 40 TABLET, DELAYED RELEASE ORAL at 05:50

## 2024-11-10 RX ADMIN — VALSARTAN 80 MILLIGRAM(S): 160 TABLET ORAL at 06:39

## 2024-11-10 NOTE — PROGRESS NOTE ADULT - SUBJECTIVE AND OBJECTIVE BOX
Ortho Note    Pt comfortable without complaints, pain controlled  Denies CP, SOB, N/V, numbness/tingling     Vital Signs Last 24 Hrs  T(C): 36.4 (11-10-24 @ 05:10), Max: 36.4 (11-10-24 @ 05:10)  T(F): 97.5 (11-10-24 @ 05:10), Max: 97.5 (11-10-24 @ 05:10)  HR: 84 (11-10-24 @ 05:10) (84 - 84)  BP: 150/75 (11-10-24 @ 05:10) (150/75 - 150/75)  BP(mean): --  RR: 17 (11-10-24 @ 05:10) (17 - 17)  SpO2: 98% (11-10-24 @ 05:10) (98% - 98%)  I&O's Summary    09 Nov 2024 07:01  -  10 Nov 2024 07:00  --------------------------------------------------------  IN: 720 mL / OUT: 950 mL / NET: -230 mL        General: Pt Alert and oriented, NAD  Bilat LE     Sensation: SILT L2-S1    Motor: SILT    L2 (hip flexion)  5/5     L3 (knee extension)  5/5     L4 (ankle dorsiflexion)  5/5     L5 (long toe extension) 5/5     S1 (ankle plantar flexion) 5/5                           11.7   6.81  )-----------( 195      ( 10 Nov 2024 06:37 )             38.0     11-10    142  |  107  |  34[H]  ----------------------------<  83  4.3   |  23  |  1.17    Ca    9.0      10 Nov 2024 06:37        A/P: 84yF s/p T9-Pelvis PSF (4/2024) now with worsening weakness. MRI -  PJK and disc bulge at T8-9 on 11-08. Plan for extension of fusion T3 to prior, lami T8-T10 on Monday 11/11  - Weight Bearing Status: WBAT  - Pain control  - DVT PPx: SCDs  - Med clearance   - Anesthesia clearance   -?Pre-op labs and imaging?-?CXR, EKG,  CBC, BMP, T&S/ABO, PT/PTT/INR  - NPO MN prior to OR   -?Analgesia PRN?per primary   Dispo: OR on 11/11      Ortho Pager [phone]

## 2024-11-10 NOTE — PROGRESS NOTE ADULT - SUBJECTIVE AND OBJECTIVE BOX
JANES TYLER 84y Female    INTERVAL EVENTS:   waiting to speak with her surgeon about details of surgery.    SUBJECTIVE:  Patient seen and examined at bedside.  Very anxious about OR.   No episodes of SOB at the moment.   + back pain.   Rest of 12 point review of systems is negative except as stated above.    OBJECTIVE:  Vital Signs Last 24 Hrs  T(C): 36.7 (09 Nov 2024 09:14), Max: 36.9 (08 Nov 2024 17:00)  T(F): 98.1 (09 Nov 2024 09:14), Max: 98.5 (08 Nov 2024 17:00)  HR: 73 (09 Nov 2024 09:14) (71 - 76)  BP: 134/63 (09 Nov 2024 09:14) (130/69 - 157/73)  BP(mean): --  RR: 17 (09 Nov 2024 09:14) (16 - 18)  SpO2: 98% (09 Nov 2024 09:14) (95% - 98%)    Parameters below as of 09 Nov 2024 09:14  Patient On (Oxygen Delivery Method): room air        PHYSICAL EXAM:  General: NAD. Speaking in full sentences. Sitting up in bed.  HEENT: NC/AT; PERRL, clear conjunctiva  Neck: supple  Respiratory: CTA b/l, no retractions or accessory muscle use. No increased work of breathing.   Cardiovascular: +S1/S2; RRR  Abdomen: soft, NT/ND; +BS x4  Extremities: 2+ peripheral pulses b/l; no LE edema  Neurological: AAOx3. No FND noted.  Psychiatry: Mood and Affect appropriate.    LABS:                        11.7   6.81  )-----------( 195      ( 10 Nov 2024 06:37 )             38.0     11-10    142  |  107  |  34[H]  ----------------------------<  83  4.3   |  23  |  1.17    Ca    9.0      10 Nov 2024 06:37        Urinalysis Basic - ( 10 Nov 2024 06:37 )    Color: x / Appearance: x / SG: x / pH: x  Gluc: 83 mg/dL / Ketone: x  / Bili: x / Urobili: x   Blood: x / Protein: x / Nitrite: x   Leuk Esterase: x / RBC: x / WBC x   Sq Epi: x / Non Sq Epi: x / Bacteria: x        RADIOLOGY & ADDITIONAL TESTS: Reviewed.    MEDICATIONS:  MEDICATIONS  (STANDING):  acetaminophen     Tablet .. 1000 milliGRAM(s) Oral every 8 hours  atorvastatin 80 milliGRAM(s) Oral at bedtime  diltiazem    milliGRAM(s) Oral daily  DULoxetine 30 milliGRAM(s) Oral daily  hydroxychloroquine 200 milliGRAM(s) Oral two times a day  lactated ringers. 1000 milliLiter(s) (50 mL/Hr) IV Continuous <Continuous>  methocarbamol 500 milliGRAM(s) Oral every 8 hours  pantoprazole    Tablet 40 milliGRAM(s) Oral before breakfast  polyethylene glycol 3350 17 Gram(s) Oral daily  senna 2 Tablet(s) Oral at bedtime    MEDICATIONS  (PRN):  albuterol    90 MICROgram(s) HFA Inhaler 2 Puff(s) Inhalation every 6 hours PRN PRN  bisacodyl Suppository 10 milliGRAM(s) Rectal daily PRN Constipation  HYDROmorphone  Injectable 0.5 milliGRAM(s) IV Push every 6 hours PRN Breakthrough pain  oxyCODONE    IR 10 milliGRAM(s) Oral every 6 hours PRN Severe Pain (7 - 10)  oxyCODONE    IR 5 milliGRAM(s) Oral every 6 hours PRN Moderate Pain (4 - 6)  traZODone 25 milliGRAM(s) Oral at bedtime PRN insomnia      ALLERGIES:  Allergies    venlafaxine (Unknown)  penicillin (Unknown)    Intolerances

## 2024-11-11 ENCOUNTER — TRANSCRIPTION ENCOUNTER (OUTPATIENT)
Age: 84
End: 2024-11-11

## 2024-11-11 ENCOUNTER — APPOINTMENT (OUTPATIENT)
Dept: ORTHOPEDIC SURGERY | Facility: HOSPITAL | Age: 84
End: 2024-11-11
Payer: MEDICARE

## 2024-11-11 DIAGNOSIS — M51.24 OTHER INTERVERTEBRAL DISC DISPLACEMENT, THORACIC REGION: ICD-10-CM

## 2024-11-11 LAB
ANION GAP SERPL CALC-SCNC: 9 MMOL/L — SIGNIFICANT CHANGE UP (ref 5–17)
BUN SERPL-MCNC: 30 MG/DL — HIGH (ref 7–23)
CALCIUM SERPL-MCNC: 8.8 MG/DL — SIGNIFICANT CHANGE UP (ref 8.4–10.5)
CHLORIDE SERPL-SCNC: 107 MMOL/L — SIGNIFICANT CHANGE UP (ref 96–108)
CO2 SERPL-SCNC: 26 MMOL/L — SIGNIFICANT CHANGE UP (ref 22–31)
CREAT SERPL-MCNC: 1.11 MG/DL — SIGNIFICANT CHANGE UP (ref 0.5–1.3)
EGFR: 50 ML/MIN/1.73M2 — LOW
GLUCOSE SERPL-MCNC: 95 MG/DL — SIGNIFICANT CHANGE UP (ref 70–99)
HCT VFR BLD CALC: 34 % — LOW (ref 34.5–45)
HGB BLD-MCNC: 10.6 G/DL — LOW (ref 11.5–15.5)
MCHC RBC-ENTMCNC: 30.4 PG — SIGNIFICANT CHANGE UP (ref 27–34)
MCHC RBC-ENTMCNC: 31.2 G/DL — LOW (ref 32–36)
MCV RBC AUTO: 97.4 FL — SIGNIFICANT CHANGE UP (ref 80–100)
NRBC # BLD: 0 /100 WBCS — SIGNIFICANT CHANGE UP (ref 0–0)
PLATELET # BLD AUTO: 157 K/UL — SIGNIFICANT CHANGE UP (ref 150–400)
POTASSIUM SERPL-MCNC: 5 MMOL/L — SIGNIFICANT CHANGE UP (ref 3.5–5.3)
POTASSIUM SERPL-SCNC: 5 MMOL/L — SIGNIFICANT CHANGE UP (ref 3.5–5.3)
RBC # BLD: 3.49 M/UL — LOW (ref 3.8–5.2)
RBC # FLD: 14.9 % — HIGH (ref 10.3–14.5)
SODIUM SERPL-SCNC: 142 MMOL/L — SIGNIFICANT CHANGE UP (ref 135–145)
WBC # BLD: 5.48 K/UL — SIGNIFICANT CHANGE UP (ref 3.8–10.5)
WBC # FLD AUTO: 5.48 K/UL — SIGNIFICANT CHANGE UP (ref 3.8–10.5)

## 2024-11-11 PROCEDURE — 99233 SBSQ HOSP IP/OBS HIGH 50: CPT

## 2024-11-11 PROCEDURE — 22852 REMOVE SPINE FIXATION DEVICE: CPT | Mod: 59

## 2024-11-11 PROCEDURE — 63046 LAM FACETEC & FORAMOT THRC: CPT | Mod: 59

## 2024-11-11 PROCEDURE — 22842 INSERT SPINE FIXATION DEVICE: CPT | Mod: 59

## 2024-11-11 PROCEDURE — 22830 EXPLORATION OF SPINAL FUSION: CPT | Mod: AS,59

## 2024-11-11 PROCEDURE — 22614 ARTHRD PST TQ 1NTRSPC EA ADD: CPT

## 2024-11-11 PROCEDURE — 22610 ARTHRD PST TQ 1NTRSPC THRC: CPT | Mod: AS

## 2024-11-11 PROCEDURE — 20936 SP BONE AGRFT LOCAL ADD-ON: CPT

## 2024-11-11 PROCEDURE — 22610 ARTHRD PST TQ 1NTRSPC THRC: CPT

## 2024-11-11 PROCEDURE — 22842 INSERT SPINE FIXATION DEVICE: CPT | Mod: AS

## 2024-11-11 PROCEDURE — 22830 EXPLORATION OF SPINAL FUSION: CPT | Mod: 59

## 2024-11-11 PROCEDURE — 63046 LAM FACETEC & FORAMOT THRC: CPT | Mod: AS

## 2024-11-11 PROCEDURE — 22614 ARTHRD PST TQ 1NTRSPC EA ADD: CPT | Mod: AS

## 2024-11-11 DEVICE — SURGIFOAM PAD 8CM X 12.5CM X 10MM (100): Type: IMPLANTABLE DEVICE | Site: BILATERAL | Status: FUNCTIONAL

## 2024-11-11 DEVICE — IMPLANTABLE DEVICE: Type: IMPLANTABLE DEVICE | Site: BILATERAL | Status: FUNCTIONAL

## 2024-11-11 DEVICE — SCREW VITAL 6.5X40MM: Type: IMPLANTABLE DEVICE | Site: BILATERAL | Status: FUNCTIONAL

## 2024-11-11 DEVICE — CLOSURE TOP STD 5.5-6.0: Type: IMPLANTABLE DEVICE | Site: BILATERAL | Status: FUNCTIONAL

## 2024-11-11 DEVICE — SURGIFLO HEMOSTATIC MATRIX KIT: Type: IMPLANTABLE DEVICE | Site: BILATERAL | Status: FUNCTIONAL

## 2024-11-11 DEVICE — GRAFT BONE INFUSE KIT LG: Type: IMPLANTABLE DEVICE | Site: BILATERAL | Status: FUNCTIONAL

## 2024-11-11 DEVICE — SCREW 5.5X35MM: Type: IMPLANTABLE DEVICE | Site: BILATERAL | Status: FUNCTIONAL

## 2024-11-11 DEVICE — SCREW 5.5X40MM: Type: IMPLANTABLE DEVICE | Site: BILATERAL | Status: FUNCTIONAL

## 2024-11-11 RX ORDER — POLYETHYLENE GLYCOL 3350 17 G/17G
17 POWDER, FOR SOLUTION ORAL DAILY
Refills: 0 | Status: DISCONTINUED | OUTPATIENT
Start: 2024-11-11 | End: 2024-11-15

## 2024-11-11 RX ORDER — ONDANSETRON HYDROCHLORIDE 2 MG/ML
4 INJECTION, SOLUTION INTRAMUSCULAR; INTRAVENOUS EVERY 6 HOURS
Refills: 0 | Status: DISCONTINUED | OUTPATIENT
Start: 2024-11-11 | End: 2024-11-15

## 2024-11-11 RX ORDER — HYDROMORPHONE HCL/0.9% NACL/PF 6 MG/30 ML
0.5 PATIENT CONTROLLED ANALGESIA SYRINGE INTRAVENOUS
Refills: 0 | Status: DISCONTINUED | OUTPATIENT
Start: 2024-11-11 | End: 2024-11-15

## 2024-11-11 RX ORDER — HYDROMORPHONE HCL/0.9% NACL/PF 6 MG/30 ML
0.5 PATIENT CONTROLLED ANALGESIA SYRINGE INTRAVENOUS EVERY 4 HOURS
Refills: 0 | Status: DISCONTINUED | OUTPATIENT
Start: 2024-11-11 | End: 2024-11-15

## 2024-11-11 RX ORDER — TRAZODONE HYDROCHLORIDE 100 MG/1
100 TABLET ORAL AT BEDTIME
Refills: 0 | Status: DISCONTINUED | OUTPATIENT
Start: 2024-11-11 | End: 2024-11-12

## 2024-11-11 RX ORDER — CHLORHEXIDINE GLUCONATE 40 MG/ML
1 SOLUTION TOPICAL EVERY 12 HOURS
Refills: 0 | Status: DISCONTINUED | OUTPATIENT
Start: 2024-11-11 | End: 2024-11-11

## 2024-11-11 RX ORDER — PREGABALIN 150 MG/1
50 CAPSULE ORAL THREE TIMES A DAY
Refills: 0 | Status: DISCONTINUED | OUTPATIENT
Start: 2024-11-11 | End: 2024-11-15

## 2024-11-11 RX ORDER — SENNA 187 MG
2 TABLET ORAL AT BEDTIME
Refills: 0 | Status: DISCONTINUED | OUTPATIENT
Start: 2024-11-11 | End: 2024-11-15

## 2024-11-11 RX ORDER — OXYCODONE HYDROCHLORIDE 30 MG/1
5 TABLET ORAL EVERY 4 HOURS
Refills: 0 | Status: DISCONTINUED | OUTPATIENT
Start: 2024-11-11 | End: 2024-11-15

## 2024-11-11 RX ORDER — CEFAZOLIN SODIUM 1 G
2000 VIAL (EA) INJECTION EVERY 8 HOURS
Refills: 0 | Status: COMPLETED | OUTPATIENT
Start: 2024-11-11 | End: 2024-11-12

## 2024-11-11 RX ORDER — PANTOPRAZOLE SODIUM 40 MG/1
40 TABLET, DELAYED RELEASE ORAL
Refills: 0 | Status: DISCONTINUED | OUTPATIENT
Start: 2024-11-11 | End: 2024-11-15

## 2024-11-11 RX ORDER — METHOCARBAMOL 500 MG/1
500 TABLET ORAL EVERY 8 HOURS
Refills: 0 | Status: DISCONTINUED | OUTPATIENT
Start: 2024-11-11 | End: 2024-11-15

## 2024-11-11 RX ORDER — POVIDONE-IODINE 0.07 MG/ML
1 SOLUTION TOPICAL ONCE
Refills: 0 | Status: COMPLETED | OUTPATIENT
Start: 2024-11-11 | End: 2024-11-11

## 2024-11-11 RX ORDER — ACETAMINOPHEN 500 MG
1000 TABLET ORAL EVERY 8 HOURS
Refills: 0 | Status: DISCONTINUED | OUTPATIENT
Start: 2024-11-11 | End: 2024-11-11

## 2024-11-11 RX ORDER — ACETAMINOPHEN 500 MG
1000 TABLET ORAL EVERY 8 HOURS
Refills: 0 | Status: DISCONTINUED | OUTPATIENT
Start: 2024-11-11 | End: 2024-11-15

## 2024-11-11 RX ADMIN — CHLORHEXIDINE GLUCONATE 1 APPLICATION(S): 40 SOLUTION TOPICAL at 08:30

## 2024-11-11 RX ADMIN — Medication 1000 MILLIGRAM(S): at 06:32

## 2024-11-11 RX ADMIN — Medication 2 TABLET(S): at 22:36

## 2024-11-11 RX ADMIN — POLYETHYLENE GLYCOL 3350 17 GRAM(S): 17 POWDER, FOR SOLUTION ORAL at 22:37

## 2024-11-11 RX ADMIN — Medication 120 MILLIGRAM(S): at 06:31

## 2024-11-11 RX ADMIN — METHOCARBAMOL 500 MILLIGRAM(S): 500 TABLET ORAL at 06:31

## 2024-11-11 RX ADMIN — PREGABALIN 50 MILLIGRAM(S): 150 CAPSULE ORAL at 22:36

## 2024-11-11 RX ADMIN — Medication 200 MILLIGRAM(S): at 06:31

## 2024-11-11 RX ADMIN — Medication 100 MILLIGRAM(S): at 19:08

## 2024-11-11 RX ADMIN — POVIDONE-IODINE 1 APPLICATION(S): 0.07 SOLUTION TOPICAL at 09:58

## 2024-11-11 RX ADMIN — Medication 50 MILLILITER(S): at 08:20

## 2024-11-11 RX ADMIN — METHOCARBAMOL 500 MILLIGRAM(S): 500 TABLET ORAL at 22:36

## 2024-11-11 RX ADMIN — PANTOPRAZOLE SODIUM 40 MILLIGRAM(S): 40 TABLET, DELAYED RELEASE ORAL at 06:31

## 2024-11-11 RX ADMIN — Medication 1000 MILLIGRAM(S): at 22:36

## 2024-11-11 RX ADMIN — Medication 100 MILLILITER(S): at 19:02

## 2024-11-11 NOTE — PROGRESS NOTE ADULT - SUBJECTIVE AND OBJECTIVE BOX
Patient is a 84y old  Female who presents with a chief complaint of LBP s/p prior spinal surgery (11 Nov 2024 09:13).    Patient seen and examined today. She reports no significant back pain at rest. She is hoping to go to the OR soon for her planned spinal surgery which she reports Dr. Salguero of orthopedics came by this morning and discussed with her in detail. She denies chest pain, dyspnea, palpitations, dizziness, nausea, fever. She reports being an active individual limited only by her back pain. She is still undergoing PT for her previous surgery.     Allergies    venlafaxine (Unknown)  penicillin (Unknown)    Last Bowel Movement: 10-Nov-2024 (11-11-24 @ 09:24)    MEDICATIONS  (STANDING):  acetaminophen     Tablet .. 1000 milliGRAM(s) Oral every 8 hours  atorvastatin 80 milliGRAM(s) Oral at bedtime  chlorhexidine 2% Cloths 1 Application(s) Topical every 12 hours  diltiazem    milliGRAM(s) Oral daily  DULoxetine 30 milliGRAM(s) Oral daily  hydroxychloroquine 200 milliGRAM(s) Oral two times a day  lactated ringers. 1000 milliLiter(s) (50 mL/Hr) IV Continuous <Continuous>  methocarbamol 500 milliGRAM(s) Oral every 8 hours  pantoprazole    Tablet 40 milliGRAM(s) Oral before breakfast  polyethylene glycol 3350 17 Gram(s) Oral daily  senna 2 Tablet(s) Oral at bedtime    MEDICATIONS  (PRN):  albuterol    90 MICROgram(s) HFA Inhaler 2 Puff(s) Inhalation every 6 hours PRN PRN  bisacodyl Suppository 10 milliGRAM(s) Rectal daily PRN Constipation  HYDROmorphone  Injectable 0.5 milliGRAM(s) IV Push every 6 hours PRN Breakthrough pain  oxyCODONE    IR 10 milliGRAM(s) Oral every 6 hours PRN Severe Pain (7 - 10)  oxyCODONE    IR 5 milliGRAM(s) Oral every 6 hours PRN Moderate Pain (4 - 6)  traZODone 25 milliGRAM(s) Oral at bedtime PRN insomnia      Vital Signs Last 24 Hrs  T(C): 36.8 (11-11-24 @ 09:31), Max: 36.8 (11-10-24 @ 20:41)  T(F): 98.2 (11-11-24 @ 09:31), Max: 98.3 (11-10-24 @ 20:41)  HR: 73 (11-11-24 @ 09:31) (73 - 79)  BP: 128/72 (11-11-24 @ 09:31) (128/72 - 146/68)  BP(mean): 91 (11-11-24 @ 09:17) (87 - 91)  RR: 16 (11-11-24 @ 09:31) (16 - 18)  SpO2: 96% (11-11-24 @ 09:31) (96% - 96%)    I&O's Summary    10 Nov 2024 07:01  -  11 Nov 2024 07:00  --------------------------------------------------------  IN: 0 mL / OUT: 200 mL / NET: -200 mL    11 Nov 2024 07:01  -  11 Nov 2024 11:18  --------------------------------------------------------  IN: 150 mL / OUT: 0 mL / NET: 150 mL      Oxygen Saturation Index= Unable to calculate   [Based on FiO2 = Unknown, SpO2 = 96(11/11/2024 09:31), MAP = Unknown]    PHYSICAL EXAM:  GENERAL: NAD  HEAD:  Atraumatic, Normocephalic  EYES: EOMI, conjunctiva and sclera clear  ENMT: Moist mucous membranes  NECK: Supple, No JVD  NERVOUS SYSTEM:  Alert & Oriented X3, Good concentration; Moves extremities  CHEST/LUNG: Clear to auscultation bilaterally  HEART: Regular rate and rhythm; Normal S1 and S2  ABDOMEN: Soft, Nontender, Nondistended; Bowel sounds present  EXTREMITIES:  2+ Peripheral Pulses, No clubbing, cyanosis, or edema  PSYCH: Normal mood and affect    Consultant(s) Notes Reviewed:  [x ] YES  [ ] NO  Care Discussed with Consultants/Other Providers [ x] YES  [ ] NO    Investigations:                        10.6   5.48  )-----------( 157      ( 11 Nov 2024 05:30 )             34.0     11-11    142  |  107  |  30[H]  ----------------------------<  95  5.0   |  26  |  1.11    Ca    8.8      11 Nov 2024 05:30

## 2024-11-11 NOTE — PHYSICAL THERAPY INITIAL EVALUATION ADULT - PERTINENT HX OF CURRENT PROBLEM, REHAB EVAL
Patient is an 84 year old female s/p T9-Pelvis PSF c/b dural tear (4/2024) presents with increasing LBP with bilateral weaknesss upon ambulation for the past 5 days. Since the surgery, she had been free of back pain and ambulating well with cane, continuing PT. However, since 5 days ago, has been complaining of bilateral leg weakness when ambulating. 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

## 2024-11-11 NOTE — BRIEF OPERATIVE NOTE - NSICDXBRIEFPROCEDURE_GEN_ALL_CORE_FT
PROCEDURES:  Fusion, posterior spinal column, thoracic, posterior approach, using instrumentation 11-Nov-2024 15:21:17  Gianna Hathaway

## 2024-11-11 NOTE — PHYSICAL THERAPY INITIAL EVALUATION ADULT - ADDITIONAL COMMENTS
Patient lives in private home with 2 KATHLEEN. PTA patient was ambulating with SC and going to outpatient PT. Patient has walk in shower with shower chair. Patient reports family member will be able to stay with her after d/c

## 2024-11-11 NOTE — PHYSICAL THERAPY INITIAL EVALUATION ADULT - IMPAIRED TRANSFERS: SIT/STAND, REHAB EVAL
EXAM DESCRIPTION:  CHEST PA/LAT



COMPLETED DATE/TIME:  12/29/2017 1:20 pm



REASON FOR STUDY:  pain



COMPARISON:  Two-view chest 11/23/2011



EXAM PARAMETERS:  NUMBER OF VIEWS: two views

TECHNIQUE: Digital Frontal and Lateral radiographic views of the chest acquired.

RADIATION DOSE: NA

LIMITATIONS: none



FINDINGS:  Patient had laparoscopic surgery yesterday.  There is trace amount of right subdiaphragmat
ic free air marked with an arrow.  This is appropriate for post laparoscopy change.  Findings discuss
ed with Dr. Gomez.

LUNGS AND PLEURA: No opacities, masses or pneumothorax. No pleural effusion.

MEDIASTINUM AND HILAR STRUCTURES: No masses or contour abnormalities.

HEART AND VASCULAR STRUCTURES: Heart normal size.  No evidence for failure.

BONES: No acute findings.

HARDWARE: None in the chest.

OTHER: No other significant finding.



IMPRESSION:  Trace right subdiaphragmatic free air from laparoscopic procedure yesterday.

No acute infiltrates.  No pleural effusion or pneumothorax



COMMENT:   Pertinent findings on the imaging study reported as a CRITICAL RESULT to BEKA TAMAYO DO at13:28 on 12/29/2017.

Category of Critical Result: Subdiaphragmatic free air



TECHNICAL DOCUMENTATION:  JOB ID:  [id number]

 2011 [employer]- All Rights Reserved
pain/decreased strength

## 2024-11-11 NOTE — PROGRESS NOTE ADULT - SUBJECTIVE AND OBJECTIVE BOX
24HR EVENTS:     SUBJECTIVE: Pt seen and examined on morning rounds. Pain well controlled. Patient denies CP/SOB/N/V.      Vital Signs Last 24 Hrs  T(C): 36.8 (11 Nov 2024 08:45), Max: 36.8 (10 Nov 2024 20:41)  T(F): 98.2 (11 Nov 2024 08:45), Max: 98.3 (10 Nov 2024 20:41)  HR: 73 (11 Nov 2024 08:45) (73 - 79)  BP: 128/72 (11 Nov 2024 08:45) (128/72 - 146/68)  BP(mean): 91 (11 Nov 2024 08:45) (87 - 91)  RR: 16 (11 Nov 2024 08:45) (16 - 18)  SpO2: 96% (11 Nov 2024 08:45) (96% - 96%)    Parameters below as of 11 Nov 2024 08:45  Patient On (Oxygen Delivery Method): room air        Physical Exam:  General: NAD, resting comfortably in bed    RLE:  SILT L2-S1, symmetric  Motor 5/5 L2-S1, symmetric  Pulses 2+    LLE:  SILT L2-S1, symmetric  Motor 5/5 L2-S1, symmetric  Pulses 2+      Assessment/Plan:  84yF s/p s/p T9-Pelvis PSF (4/2024) Dr. TONO Salguero here with worsening weakness for 5 days, 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 Plan for extension of fusion T3 to prior, lami T8-T10 on 11/11  - Weight Bearing Status: WBAT  - Pain control  - DVT PPx: SCDs  - F/u AM labs  - Dispo: OR  - NPO    Jacob Lopez, PGY-1  Orthopedic Surgery

## 2024-11-11 NOTE — PHYSICAL THERAPY INITIAL EVALUATION ADULT - GAIT DEVIATIONS NOTED, PT EVAL
patient with fairly steady gait, no LOB, verbal cues for maintaining spinal precautions with turning/decreased nichole/decreased step length

## 2024-11-11 NOTE — PRE-ANESTHESIA EVALUATION ADULT - NSANTHPMHFT_GEN_ALL_CORE
83 yo F PMH arthritis, HTN, CAD, CKD3, erosive osteoarthritis, asthma and anxiety, who presented as a transfer from OSH for ortho evaluation due to concern for cord compression, for revision spinal fusion.

## 2024-11-11 NOTE — PROGRESS NOTE ADULT - SUBJECTIVE AND OBJECTIVE BOX
Orthopedics Post Op Check    Procedure: T3-T9 extension of fusion   Surgeon: Dr Salguero     Pt comfortable, without complaints. Pain well controlled in PACU. Pt is pleased with how she is feeling.   Denies CP, SOB, N/V, numbness/tingling     Vital Signs Last 24 Hrs  T(C): 36.1 (11 Nov 2024 15:16), Max: 36.8 (10 Nov 2024 20:41)  T(F): 97 (11 Nov 2024 15:16), Max: 98.3 (10 Nov 2024 20:41)  HR: 76 (11 Nov 2024 16:16) (73 - 82)  BP: 125/62 (11 Nov 2024 15:46) (125/62 - 154/68)  BP(mean): 89 (11 Nov 2024 15:46) (86 - 98)  RR: 20 (11 Nov 2024 16:16) (14 - 20)  SpO2: 95% (11 Nov 2024 16:16) (95% - 99%)    Parameters below as of 11 Nov 2024 16:16  Patient On (Oxygen Delivery Method): room air      AVSS, NAD  General: Pt Alert and oriented, comfortable  Dressing C/D/I- gauze/paper tape   HV x 1 in place   Sensation intact and equal BLE   Motor ehl/ta/gs/fhl 5/5 BLE   Pulses dp palpable, skin warm and well perfused  Cap refill brisk BLE                           10.6   5.48  )-----------( 157      ( 11 Nov 2024 05:30 )             34.0   11-11    142  |  107  |  30[H]  ----------------------------<  95  5.0   |  26  |  1.11    Ca    8.8      11 Nov 2024 05:30    A/P: 84yFemale POD#0 s/p extension of fusion T3-T9   - Stable. Monitor labs and VS and drain output.   - Pain Control  - DVT ppx: SCDs  - Rosa-op abx: Ancef   - PT, WBS: WBAT   - F/U AM Labs

## 2024-11-11 NOTE — PHYSICAL THERAPY INITIAL EVALUATION ADULT - GENERAL OBSERVATIONS, REHAB EVAL
KAVON Nova aware of intent to eval. Patient received semi-supine in NAD with +heplock +spinal dressing clean/dry/intact +hemovac x 1

## 2024-11-12 ENCOUNTER — APPOINTMENT (OUTPATIENT)
Dept: ORTHOPEDIC SURGERY | Facility: CLINIC | Age: 84
End: 2024-11-12

## 2024-11-12 ENCOUNTER — TRANSCRIPTION ENCOUNTER (OUTPATIENT)
Age: 84
End: 2024-11-12

## 2024-11-12 LAB
ANION GAP SERPL CALC-SCNC: 10 MMOL/L — SIGNIFICANT CHANGE UP (ref 5–17)
BASOPHILS # BLD AUTO: 0 K/UL — SIGNIFICANT CHANGE UP (ref 0–0.2)
BASOPHILS NFR BLD AUTO: 0 % — SIGNIFICANT CHANGE UP (ref 0–2)
BUN SERPL-MCNC: 24 MG/DL — HIGH (ref 7–23)
CALCIUM SERPL-MCNC: 8.5 MG/DL — SIGNIFICANT CHANGE UP (ref 8.4–10.5)
CHLORIDE SERPL-SCNC: 109 MMOL/L — HIGH (ref 96–108)
CO2 SERPL-SCNC: 23 MMOL/L — SIGNIFICANT CHANGE UP (ref 22–31)
CREAT SERPL-MCNC: 1.14 MG/DL — SIGNIFICANT CHANGE UP (ref 0.5–1.3)
EGFR: 48 ML/MIN/1.73M2 — LOW
EOSINOPHIL # BLD AUTO: 0 K/UL — SIGNIFICANT CHANGE UP (ref 0–0.5)
EOSINOPHIL NFR BLD AUTO: 0 % — SIGNIFICANT CHANGE UP (ref 0–6)
GLUCOSE SERPL-MCNC: 143 MG/DL — HIGH (ref 70–99)
HCT VFR BLD CALC: 28.8 % — LOW (ref 34.5–45)
HGB BLD-MCNC: 9.1 G/DL — LOW (ref 11.5–15.5)
LYMPHOCYTES # BLD AUTO: 0.24 K/UL — LOW (ref 1–3.3)
LYMPHOCYTES # BLD AUTO: 2.6 % — LOW (ref 13–44)
MCHC RBC-ENTMCNC: 30 PG — SIGNIFICANT CHANGE UP (ref 27–34)
MCHC RBC-ENTMCNC: 31.6 G/DL — LOW (ref 32–36)
MCV RBC AUTO: 95 FL — SIGNIFICANT CHANGE UP (ref 80–100)
MONOCYTES # BLD AUTO: 0.24 K/UL — SIGNIFICANT CHANGE UP (ref 0–0.9)
MONOCYTES NFR BLD AUTO: 2.6 % — SIGNIFICANT CHANGE UP (ref 2–14)
NEUTROPHILS # BLD AUTO: 8.76 K/UL — HIGH (ref 1.8–7.4)
NEUTROPHILS NFR BLD AUTO: 94.8 % — HIGH (ref 43–77)
PLATELET # BLD AUTO: 161 K/UL — SIGNIFICANT CHANGE UP (ref 150–400)
POTASSIUM SERPL-MCNC: 4.7 MMOL/L — SIGNIFICANT CHANGE UP (ref 3.5–5.3)
POTASSIUM SERPL-SCNC: 4.7 MMOL/L — SIGNIFICANT CHANGE UP (ref 3.5–5.3)
RBC # BLD: 3.03 M/UL — LOW (ref 3.8–5.2)
RBC # FLD: 14.5 % — SIGNIFICANT CHANGE UP (ref 10.3–14.5)
SODIUM SERPL-SCNC: 142 MMOL/L — SIGNIFICANT CHANGE UP (ref 135–145)
WBC # BLD: 9.24 K/UL — SIGNIFICANT CHANGE UP (ref 3.8–10.5)
WBC # FLD AUTO: 9.24 K/UL — SIGNIFICANT CHANGE UP (ref 3.8–10.5)

## 2024-11-12 PROCEDURE — 99233 SBSQ HOSP IP/OBS HIGH 50: CPT

## 2024-11-12 RX ORDER — TRAZODONE HYDROCHLORIDE 100 MG/1
25 TABLET ORAL AT BEDTIME
Refills: 0 | Status: DISCONTINUED | OUTPATIENT
Start: 2024-11-12 | End: 2024-11-15

## 2024-11-12 RX ORDER — HYDROXYCHLOROQUINE SULFATE 200 MG
200 TABLET ORAL
Refills: 0 | Status: DISCONTINUED | OUTPATIENT
Start: 2024-11-12 | End: 2024-11-15

## 2024-11-12 RX ORDER — LOSARTAN POTASSIUM 25 MG/1
50 TABLET ORAL DAILY
Refills: 0 | Status: DISCONTINUED | OUTPATIENT
Start: 2024-11-12 | End: 2024-11-15

## 2024-11-12 RX ORDER — DILTIAZEM HCL 5 MG/ML
120 VIAL (ML) INTRAVENOUS DAILY
Refills: 0 | Status: DISCONTINUED | OUTPATIENT
Start: 2024-11-12 | End: 2024-11-12

## 2024-11-12 RX ORDER — DILTIAZEM HCL 5 MG/ML
120 VIAL (ML) INTRAVENOUS DAILY
Refills: 0 | Status: DISCONTINUED | OUTPATIENT
Start: 2024-11-12 | End: 2024-11-15

## 2024-11-12 RX ORDER — DULOXETINE HYDROCHLORIDE 30 MG/1
60 CAPSULE, DELAYED RELEASE ORAL DAILY
Refills: 0 | Status: DISCONTINUED | OUTPATIENT
Start: 2024-11-12 | End: 2024-11-15

## 2024-11-12 RX ADMIN — PREGABALIN 50 MILLIGRAM(S): 150 CAPSULE ORAL at 13:30

## 2024-11-12 RX ADMIN — Medication 1000 MILLIGRAM(S): at 06:37

## 2024-11-12 RX ADMIN — Medication 1000 MILLIGRAM(S): at 21:18

## 2024-11-12 RX ADMIN — PANTOPRAZOLE SODIUM 40 MILLIGRAM(S): 40 TABLET, DELAYED RELEASE ORAL at 06:37

## 2024-11-12 RX ADMIN — DULOXETINE HYDROCHLORIDE 60 MILLIGRAM(S): 30 CAPSULE, DELAYED RELEASE ORAL at 16:50

## 2024-11-12 RX ADMIN — PREGABALIN 50 MILLIGRAM(S): 150 CAPSULE ORAL at 06:37

## 2024-11-12 RX ADMIN — ONDANSETRON HYDROCHLORIDE 4 MILLIGRAM(S): 2 INJECTION, SOLUTION INTRAMUSCULAR; INTRAVENOUS at 03:17

## 2024-11-12 RX ADMIN — PREGABALIN 50 MILLIGRAM(S): 150 CAPSULE ORAL at 21:17

## 2024-11-12 RX ADMIN — Medication 120 MILLIGRAM(S): at 16:50

## 2024-11-12 RX ADMIN — LOSARTAN POTASSIUM 50 MILLIGRAM(S): 25 TABLET ORAL at 16:50

## 2024-11-12 RX ADMIN — Medication 1000 MILLIGRAM(S): at 13:29

## 2024-11-12 RX ADMIN — METHOCARBAMOL 500 MILLIGRAM(S): 500 TABLET ORAL at 06:37

## 2024-11-12 RX ADMIN — Medication 200 MILLIGRAM(S): at 18:24

## 2024-11-12 RX ADMIN — Medication 100 MILLIGRAM(S): at 04:24

## 2024-11-12 NOTE — PROGRESS NOTE ADULT - SUBJECTIVE AND OBJECTIVE BOX
24HR EVENTS:     SUBJECTIVE: Pt seen and examined on morning rounds. Pain well controlled. Patient denies CP/SOB/N/V. Urinating without issue.      Vital Signs Last 24 Hrs  T(C): 36.9 (12 Nov 2024 05:32), Max: 37.4 (12 Nov 2024 00:07)  T(F): 98.5 (12 Nov 2024 05:32), Max: 99.4 (12 Nov 2024 00:07)  HR: 80 (12 Nov 2024 05:32) (73 - 95)  BP: 162/74 (12 Nov 2024 05:32) (119/57 - 162/74)  BP(mean): 93 (11 Nov 2024 16:46) (82 - 98)  RR: 17 (12 Nov 2024 05:32) (14 - 20)  SpO2: 98% (12 Nov 2024 05:32) (95% - 99%)    Parameters below as of 12 Nov 2024 05:32  Patient On (Oxygen Delivery Method): room air        Physical Exam:  General: NAD, resting comfortably in bed    RLE:  SILT L2-S1, symmetric  Motor 5/5 L2-S1, symmetric  Pulses 2+    LLE:  SILT L2-S1, symmetric  Motor 5/5 L2-S1, symmetric  Pulses 2+    HV: 130/240    Assessment/Plan:  84yF s/p OR T4-9 extension of PSF, laminectomy T8-9, exploration of fusion, WINIFRED by Dr. TONO Salugero on 11-11  - Weight Bearing Status: WBAT  - Pain control  - DVT PPx: SCDs  - PT rec: HPT  - F/u AM labs  - Dispo: HPT pending PT, drain    Jacob Lopez, PGY-1  Orthopedic Surgery

## 2024-11-12 NOTE — OCCUPATIONAL THERAPY INITIAL EVALUATION ADULT - DIAGNOSIS, OT EVAL
Pt s/p T4-9 extension of PSF, laminectomy T8-9, exploration of fusion, WINIFRED 11/11. Pt with no new neurological or physical deficits at this time. Pt able to perform all ADLs and functional mobility independently and safely. No further acute OT needs, pt will be d/c from OT at this time.

## 2024-11-12 NOTE — PROGRESS NOTE ADULT - SUBJECTIVE AND OBJECTIVE BOX
Ortho Note    Subjective:  Pt comfortable without complaints, pain controlled with current pain medication regimen   Denies CP, SOB, N/V,   patient continues to report numbness and tingling to bilateral feet (L4-S1).   Reviewed plan of care with patient at bedside  Patient reports she lives alone     Vital Signs Last 24 Hrs  T(C): 37.1 (11-12-24 @ 10:15), Max: 38.1 (11-12-24 @ 09:15)  T(F): 98.7 (11-12-24 @ 10:15), Max: 100.5 (11-12-24 @ 09:15)  HR: 73 (11-12-24 @ 10:15) (73 - 106)  BP: 160/69 (11-12-24 @ 10:15) (160/69 - 161/69)  BP(mean): 100 (11-12-24 @ 10:15) (100 - 100)  RR: 16 (11-12-24 @ 10:15) (16 - 17)  SpO2: 99% (11-12-24 @ 10:15) (98% - 99%)  AVSS    Objective:    Physical Exam:  General: Pt Alert and oriented, NAD  Lumbar DSG C/D/I  Pulses: +2 dorsal pedal pulses, bilateral LE  Sensation: patient continues to report numbness and tingling to bilateral feet (L4-S1). similar to preop.   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 Plan for extension of fusion T3 to prior, lami T8-T10 on Monday 11/11  - x1 temp 100.5 oral, denies fever chills, sob, diarrhea, urinary frequency repeat temp 98.7 will continue to trend   - Pain Control-  tylenol 1000mg PO Q8h, Dilaudid 0.5mg Q4h prn breakthrough pain, methocarbamol 500mg PO Q8h, Oxycodone 5mg PO Q4h prn severe pain, lyrica 50mg PO TID,   - DVT ppx: scds  - PT, WBS: WBAT  - appreciate medicine recs  - bowel regimen, IS use, PPI   - Dispo- home versus [facility] pending pt progression, medical clearance.     Ortho Pager [phone]

## 2024-11-12 NOTE — OCCUPATIONAL THERAPY INITIAL EVALUATION ADULT - PERTINENT HX OF CURRENT PROBLEM, REHAB EVAL
Patient is an 84 year old female s/p T9-Pelvis PSF c/b dural tear (4/2024) presents with increasing LBP with bilateral weaknesss upon ambulation for the past 5 days. Since the surgery, she had been free of back pain and ambulating well with cane, continuing PT. However, since 5 days ago, has been complaining of bilateral leg weakness when ambulating. 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 Pt s/p T4-9 extension of PSF, laminectomy T8-9, exploration of fusion, WINIFRED 11/11.

## 2024-11-12 NOTE — DISCHARGE NOTE PROVIDER - CARE PROVIDER_API CALL
Robi Salguero  Orthopaedic Surgery  [address]  [place] [address]  Phone: [phone]  Fax: [phone]  Follow Up Time: 2 weeks

## 2024-11-12 NOTE — OCCUPATIONAL THERAPY INITIAL EVALUATION ADULT - ADDITIONAL COMMENTS
Pt lives in a house with 2 KATHLEEN +HR alone. Pt was indep PTA with ADLs and functional mobilty, pt son lives nearby to assist with home management. Pt ambulated with a straight cane. Pt has a walk in shower with a shower chair and grab bars.

## 2024-11-12 NOTE — OCCUPATIONAL THERAPY INITIAL EVALUATION ADULT - GENERAL OBSERVATIONS, REHAB EVAL
Pt received semi-supine in bed +heplock +hemovac, in NAD and agreeable to OT. Cleared by KAVON Nova to see pt.

## 2024-11-12 NOTE — DISCHARGE NOTE PROVIDER - NSDCFUADDINST_GEN_ALL_CORE_FT
ACTIVITY:   - No extreme bending, extending, turning, twisting, or straining. No strenuous activity, heavy lifting, driving or returning to work until cleared by your surgeon.     DRESSING/SHOWERING:    (PRINEO – mesh with glue)   -May shower post-op day 1, pat dry afterwards. Dressing is water-resistant. Do not soak in bathtubs. Do not need to cover with another dressing. Do not remove mesh dressing – it will be taken care of in your follow up appointment. Do not apply ointments, creams or oils to incision area.     (STAPLES/SUTURES/STERI-STRIPS)   -Change dressing daily until post-op day 5. Sponge bathe until post-op day 5 then may take full shower. Once dressing removed keep incision clean and dry. Do not pick at your incision. Do not apply creams, ointments or oils to your incision until cleared by your surgeon. Do not soak your incision in sitting water (ie tubs, pools, lakes, etc.) until cleared by your surgeon.      MEDICATION/ANTICOAGULATION:   - You have been prescribed medications for pain:     - Tylenol (Acetaminophen) for mild to moderate pain. Do not exceed 3,000mg daily.     - For more severe pain, you may continue to take the Tylenol with the addition of narcotic pain medication. Take this medication as prescribed. Do not take more than prescribed. Note that this medication may cause drowsiness or dizziness. Do not operate machinery. This medication may cause constipation.   - If you have been prescribed a muscle relaxer, take this medication as needed for muscle spasm. Follow instructions on bottle.   - Try to have regular bowel movements. Take stool softener or laxative if necessary. You may wish to take Miralax daily until you have regular bowel movements.    - Do not take antiinflammatories (Aleve, Advil, Naproxen, Ibuprofen, etc.) until cleared by your surgeon. Tylenol is not an anti-inflammatory and okay to take (see above).   - If you have a pain management physician, please follow-up with them postoperatively.    - If you experience any negative side effects of your medications, please call your surgeon's office to discuss.     FOLLOW UP:   - Call to schedule an appt with Dr. Salgeuro for follow up.    - Please follow-up with your primary care physician or any other specialist you see postoperatively, if needed.    - Contact your doctor or go to the emergency room if you experience: fever greater than 101.5, chills, chest pain, difficulty breathing, redness or excessive drainage around the incision, other concerns.   ACTIVITY:   - No extreme bending, extending, turning, twisting, or straining. No strenuous activity, heavy lifting, driving or returning to work until cleared by your surgeon.     DRESSING/SHOWERING:    (SUTURES/STERI-STRIPS)   -Change dressing daily as needed until post-op day 5. Sponge bathe until post-op day 5 then may take full shower. Once dressing removed keep incision clean and dry. Do not pick at your incision. Do not apply creams, ointments or oils to your incision until cleared by your surgeon. Do not soak your incision in sitting water (ie tubs, pools, lakes, etc.) until cleared by your surgeon.      MEDICATION/ANTICOAGULATION:   - You have been prescribed medications for pain:     - Tylenol (Acetaminophen) for mild to moderate pain. Do not exceed 3,000mg daily.     - For more severe pain, you may continue to take the Tylenol with the addition of narcotic pain medication. Take this medication as prescribed. Do not take more than prescribed. Note that this medication may cause drowsiness or dizziness. Do not operate machinery. This medication may cause constipation.   - If you have been prescribed a muscle relaxer, take this medication as needed for muscle spasm. Follow instructions on bottle.   - Try to have regular bowel movements. Take stool softener or laxative if necessary. You may wish to take Miralax daily until you have regular bowel movements.    - Do not take antiinflammatories (Aleve, Advil, Naproxen, Ibuprofen, etc.) until cleared by your surgeon. Tylenol is not an anti-inflammatory and okay to take (see above).   - If you have a pain management physician, please follow-up with them postoperatively.    - If you experience any negative side effects of your medications, please call your surgeon's office to discuss.     FOLLOW UP:   - Call to schedule an appt with Dr. Salguero for follow up.    - Please follow-up with your primary care physician or any other specialist you see postoperatively, if needed.    - Contact your doctor or go to the emergency room if you experience: fever greater than 101.5, chills, chest pain, difficulty breathing, redness or excessive drainage around the incision, other concerns.   ACTIVITY:   - No extreme bending, extending, turning, twisting, or straining. No strenuous activity, heavy lifting, driving or returning to work until cleared by your surgeon.     DRESSING/SHOWERING:    (SUTURES/STERI-STRIPS)   -Change dressing daily as needed until post-op day 5. Sponge bathe until post-op day 5 then may take full shower. Once dressing removed keep incision clean and dry. Do not pick at your incision. Do not apply creams, ointments or oils to your incision until cleared by your surgeon. Do not soak your incision in sitting water (ie tubs, pools, lakes, etc.) until cleared by your surgeon.     MEDICATION/ANTICOAGULATION:   - You have been prescribed medications for pain:     - Tylenol (Acetaminophen) for mild to moderate pain. Do not exceed 3,000mg daily.     - For more severe pain, you may continue to take the Tylenol with the addition of narcotic pain medication. Take this medication as prescribed. Do not take more than prescribed. Note that this medication may cause drowsiness or dizziness. Do not operate machinery. This medication may cause constipation.   - If you have been prescribed a muscle relaxer, take this medication as needed for muscle spasm. Follow instructions on bottle.   - Try to have regular bowel movements. Take stool softener or laxative if necessary. You may wish to take Miralax daily until you have regular bowel movements.    - Do not take antiinflammatories (Aleve, Advil, Naproxen, Ibuprofen, etc.) until cleared by your surgeon. Tylenol is not an anti-inflammatory and okay to take (see above).   - If you have a pain management physician, please follow-up with them postoperatively.    - If you experience any negative side effects of your medications, please call your surgeon's office to discuss.     FOLLOW UP:   - Call to schedule an appt with Dr. Salguero for follow up.    - Please follow-up with your primary care physician or any other specialist you see postoperatively, if needed.    - Contact your doctor or go to the emergency room if you experience: fever greater than 101.5, chills, chest pain, difficulty breathing, redness or excessive drainage around the incision, other concerns.   DC instructions

## 2024-11-12 NOTE — DISCHARGE NOTE PROVIDER - NSDCCPTREATMENT_GEN_ALL_CORE_FT
PRINCIPAL PROCEDURE  Procedure: Fusion, posterior spinal column, thoracic, posterior approach, using instrumentation  Findings and Treatment: T3-T9

## 2024-11-12 NOTE — PROGRESS NOTE ADULT - SUBJECTIVE AND OBJECTIVE BOX
Patient is a 84y old  Female who presents with a chief complaint of LBP s/p prior spinal surgery (12 Nov 2024 06:56).    Patient seen and examined today. She reports no significant post op pain. She moved her bowels today. She denies chest pain, dyspnea, dizziness, sore throat, cough, chills, nausea, diarrhea, headache, body aches.     Allergies    venlafaxine (Unknown)  penicillin (Unknown)    Last Bowel Movement: 10-Nov-2024 (11-12-24 @ 03:26)    MEDICATIONS  (STANDING):  acetaminophen     Tablet .. 1000 milliGRAM(s) Oral every 8 hours  lactated ringers. 1000 milliLiter(s) (100 mL/Hr) IV Continuous <Continuous>  methocarbamol 500 milliGRAM(s) Oral every 8 hours  ondansetron   Disintegrating Tablet 4 milliGRAM(s) Oral every 6 hours  pantoprazole    Tablet 40 milliGRAM(s) Oral before breakfast  polyethylene glycol 3350 17 Gram(s) Oral daily  pregabalin 50 milliGRAM(s) Oral three times a day  senna 2 Tablet(s) Oral at bedtime    MEDICATIONS  (PRN):  HYDROmorphone  Injectable 0.5 milliGRAM(s) IV Push every 4 hours PRN For breakthrough pain  HYDROmorphone  Injectable 0.5 milliGRAM(s) IV Push every 15 minutes PRN For breakthrough pain  oxyCODONE    IR 5 milliGRAM(s) Oral every 4 hours PRN Severe Pain (7 - 10)  traZODone 100 milliGRAM(s) Oral at bedtime PRN insomnia    Vital Signs Last 24 Hrs  T(C): 38.1 (11-12-24 @ 09:15), Max: 38.1 (11-12-24 @ 09:15)  T(F): 100.5 (11-12-24 @ 09:15), Max: 100.5 (11-12-24 @ 09:15)  HR: 106 (11-12-24 @ 09:15) (74 - 106)  BP: 161/69 (11-12-24 @ 09:15) (119/57 - 162/74)  BP(mean): 100 (11-12-24 @ 09:15) (82 - 100)  RR: 17 (11-12-24 @ 09:15) (14 - 20)  SpO2: 98% (11-12-24 @ 09:15) (95% - 99%)    I&O's Summary    11 Nov 2024 07:01  -  12 Nov 2024 07:00  --------------------------------------------------------  IN: 450 mL / OUT: 1250 mL / NET: -800 mL      Oxygen Saturation Index= Unable to calculate   [Based on FiO2 = Unknown, SpO2 = 98(11/12/2024 09:15), MAP = Unknown]    PHYSICAL EXAM:  GENERAL: NAD  HEAD:  Atraumatic, Normocephalic  EYES: EOMI, conjunctiva and sclera clear  ENMT: Moist mucous membranes  NECK: Supple, No JVD  NERVOUS SYSTEM:  Alert & Oriented X3, Moves extremities  CHEST/LUNG: Clear to auscultation bilaterally  HEART: 88 bpm manually take and regular rhythm; Normal S1 and S2  ABDOMEN: Soft, Nontender, Nondistended; Bowel sounds present  EXTREMITIES:  back dressing c/d/i with drain in place; 2+ Peripheral Pulses  PSYCH: Normal mood and affect    Consultant(s) Notes Reviewed:  [x ] YES  [ ] NO  Care Discussed with Consultants/Other Providers [ x] YES  [ ] NO      Investigations:                        9.1    9.24  )-----------( 161      ( 12 Nov 2024 05:30 )             28.8     11-12    142  |  109[H]  |  24[H]  ----------------------------<  143[H]  4.7   |  23  |  1.14    Ca    8.5      12 Nov 2024 05:30

## 2024-11-12 NOTE — DISCHARGE NOTE PROVIDER - NSDCCPCAREPLAN_GEN_ALL_CORE_FT
PRINCIPAL DISCHARGE DIAGNOSIS  Diagnosis: Thoracic disc herniation  Assessment and Plan of Treatment:

## 2024-11-12 NOTE — DISCHARGE NOTE PROVIDER - HOSPITAL COURSE
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 Plan for extension of fusion T3 to prior, lami T8-T10 on Monday 11/11  Rosa-op Antibiotics  Pain control  DVT prophylaxis  OOB/Physical Therapy  Consultants: Medicine comanagement  11/8: Transferred from [facility] with low back pain  11/9: YIESL  11/10: Preopped for OR  11/11: OR for extension of fusion to T3, laminectomy T8-T10  11/12: YISEL  11/13: Acute rehab denied   11/14: Drain discontinued when output appropriately low Admitted to Orthopedic service from [facility]   Surgery: 84y Female s/p T9-Pelvis PSF (4/2024) here with worsening weakness for 5 days, 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 Plan for extension of fusion T3 to prior, lami T8-T10 on Monday 11/11  Rosa-op Antibiotics  Pain control  DVT prophylaxis  OOB/Physical Therapy  Consultants: Medicine comanagement  11/8: Transferred from [facility] with low back pain and MRI   11/9: YISEL  11/10: Optimized for OR  11/11: OR for extension of fusion to T3, laminectomy T8-T10  11/12: YISEL  11/13: Acute rehab denied   11/14: Drain discontinued when output appropriately low   11/15: Medically optimized for discharge to rehab

## 2024-11-12 NOTE — DISCHARGE NOTE PROVIDER - NSDCMRMEDTOKEN_GEN_ALL_CORE_FT
acetaminophen 500 mg oral tablet: 2 tab(s) orally every 8 hours  Albuterol (Eqv-ProAir HFA) 90 mcg/inh inhalation aerosol: 2 puff(s) inhaled every 6 hours as needed for PRN wheezing  aspirin 81 mg oral capsule: 1 cap(s) orally once a day can resume Saturday 4/20  bisacodyl 10 mg rectal suppository: 1 suppository(ies) rectal once a day As needed Constipation  DilTIAZem (Eqv-Cardizem CD) 120 mg/24 hours oral capsule, extended release: 1 cap(s) orally 2 times a day  DULoxetine 30 mg oral delayed release capsule: 1 cap(s) orally once a day  DULoxetine 60 mg oral delayed release capsule: 1 cap(s) orally once a day  enoxaparin 40 mg/0.4 mL injectable solution: 40 milligram(s) injectable once a day for DVT prophylaxis while at rehab.  hydroxychloroquine 200 mg oral tablet: 1 tab(s) orally 2 times a day  Lipitor 80 mg oral tablet: 1 tab(s) orally once a day  methocarbamol 500 mg oral tablet: 1 tab(s) orally every 8 hours  pantoprazole 40 mg oral delayed release tablet: 1 tab(s) orally once a day (before a meal)  polyethylene glycol 3350 oral powder for reconstitution: 17 gram(s) orally once a day  senna leaf extract oral tablet: 2 tab(s) orally once a day (at bedtime)  valsartan 160 mg oral tablet: 1 tab(s) orally 2 times a day   acetaminophen 500 mg oral tablet: 2 tab(s) orally every 8 hours  Albuterol (Eqv-ProAir HFA) 90 mcg/inh inhalation aerosol: 2 puff(s) inhaled every 6 hours as needed for PRN wheezing  aspirin 81 mg oral capsule: 1 cap(s) orally once a day  DilTIAZem (Eqv-Cardizem CD) 120 mg/24 hours oral capsule, extended release: 1 cap(s) orally 2 times a day  DULoxetine 30 mg oral delayed release capsule: 1 cap(s) orally once a day  DULoxetine 60 mg oral delayed release capsule: 1 cap(s) orally once a day  enoxaparin 40 mg/0.4 mL injectable solution: 40 milligram(s) injectable once a day for DVT prophylaxis while at rehab.  hydroxychloroquine 200 mg oral tablet: 1 tab(s) orally 2 times a day  Lipitor 80 mg oral tablet: 1 tab(s) orally once a day  methocarbamol 500 mg oral tablet: 1 tab(s) orally every 8 hours  oxyCODONE 5 mg oral tablet: 0.5 tab(s) orally every 4 hours as needed for  severe pain  pantoprazole 40 mg oral delayed release tablet: 1 tab(s) orally once a day (before a meal)  polyethylene glycol 3350 oral powder for reconstitution: 17 gram(s) orally once a day  pregabalin 50 mg oral capsule: 1 cap(s) orally 3 times a day  senna leaf extract oral tablet: 2 tab(s) orally once a day (at bedtime)  valsartan 160 mg oral tablet: 1 tab(s) orally 2 times a day

## 2024-11-13 LAB
ANION GAP SERPL CALC-SCNC: 10 MMOL/L — SIGNIFICANT CHANGE UP (ref 5–17)
BASOPHILS # BLD AUTO: 0.03 K/UL — SIGNIFICANT CHANGE UP (ref 0–0.2)
BASOPHILS NFR BLD AUTO: 0.3 % — SIGNIFICANT CHANGE UP (ref 0–2)
BUN SERPL-MCNC: 28 MG/DL — HIGH (ref 7–23)
CALCIUM SERPL-MCNC: 8.4 MG/DL — SIGNIFICANT CHANGE UP (ref 8.4–10.5)
CHLORIDE SERPL-SCNC: 105 MMOL/L — SIGNIFICANT CHANGE UP (ref 96–108)
CO2 SERPL-SCNC: 20 MMOL/L — LOW (ref 22–31)
CREAT SERPL-MCNC: 1.11 MG/DL — SIGNIFICANT CHANGE UP (ref 0.5–1.3)
EGFR: 50 ML/MIN/1.73M2 — LOW
EOSINOPHIL # BLD AUTO: 0.1 K/UL — SIGNIFICANT CHANGE UP (ref 0–0.5)
EOSINOPHIL NFR BLD AUTO: 1 % — SIGNIFICANT CHANGE UP (ref 0–6)
FERRITIN SERPL-MCNC: 82 NG/ML — SIGNIFICANT CHANGE UP (ref 13–330)
GLUCOSE SERPL-MCNC: 127 MG/DL — HIGH (ref 70–99)
HCT VFR BLD CALC: 26.6 % — LOW (ref 34.5–45)
HGB BLD-MCNC: 8.6 G/DL — LOW (ref 11.5–15.5)
IMM GRANULOCYTES NFR BLD AUTO: 0.5 % — SIGNIFICANT CHANGE UP (ref 0–0.9)
IRON SATN MFR SERPL: 15 UG/DL — LOW (ref 30–160)
IRON SATN MFR SERPL: 6 % — LOW (ref 14–50)
LYMPHOCYTES # BLD AUTO: 0.74 K/UL — LOW (ref 1–3.3)
LYMPHOCYTES # BLD AUTO: 7.5 % — LOW (ref 13–44)
MCHC RBC-ENTMCNC: 29.8 PG — SIGNIFICANT CHANGE UP (ref 27–34)
MCHC RBC-ENTMCNC: 32.3 G/DL — SIGNIFICANT CHANGE UP (ref 32–36)
MCV RBC AUTO: 92 FL — SIGNIFICANT CHANGE UP (ref 80–100)
MONOCYTES # BLD AUTO: 1.16 K/UL — HIGH (ref 0–0.9)
MONOCYTES NFR BLD AUTO: 11.7 % — SIGNIFICANT CHANGE UP (ref 2–14)
NEUTROPHILS # BLD AUTO: 7.8 K/UL — HIGH (ref 1.8–7.4)
NEUTROPHILS NFR BLD AUTO: 79 % — HIGH (ref 43–77)
NRBC # BLD: 0 /100 WBCS — SIGNIFICANT CHANGE UP (ref 0–0)
PLATELET # BLD AUTO: 152 K/UL — SIGNIFICANT CHANGE UP (ref 150–400)
POTASSIUM SERPL-MCNC: 3.9 MMOL/L — SIGNIFICANT CHANGE UP (ref 3.5–5.3)
POTASSIUM SERPL-SCNC: 3.9 MMOL/L — SIGNIFICANT CHANGE UP (ref 3.5–5.3)
RBC # BLD: 2.89 M/UL — LOW (ref 3.8–5.2)
RBC # BLD: 2.89 M/UL — LOW (ref 3.8–5.2)
RBC # FLD: 14.7 % — HIGH (ref 10.3–14.5)
RETICS #: 64.4 K/UL — SIGNIFICANT CHANGE UP (ref 25–125)
RETICS/RBC NFR: 2.2 % — SIGNIFICANT CHANGE UP (ref 0.5–2.5)
SODIUM SERPL-SCNC: 135 MMOL/L — SIGNIFICANT CHANGE UP (ref 135–145)
TIBC SERPL-MCNC: 250 UG/DL — SIGNIFICANT CHANGE UP (ref 220–430)
UIBC SERPL-MCNC: 235 UG/DL — SIGNIFICANT CHANGE UP (ref 110–370)
VIT B12 SERPL-MCNC: 659 PG/ML — SIGNIFICANT CHANGE UP (ref 232–1245)
WBC # BLD: 9.88 K/UL — SIGNIFICANT CHANGE UP (ref 3.8–10.5)
WBC # FLD AUTO: 9.88 K/UL — SIGNIFICANT CHANGE UP (ref 3.8–10.5)

## 2024-11-13 PROCEDURE — 99233 SBSQ HOSP IP/OBS HIGH 50: CPT

## 2024-11-13 RX ADMIN — DULOXETINE HYDROCHLORIDE 60 MILLIGRAM(S): 30 CAPSULE, DELAYED RELEASE ORAL at 13:22

## 2024-11-13 RX ADMIN — Medication 200 MILLIGRAM(S): at 19:07

## 2024-11-13 RX ADMIN — Medication 120 MILLIGRAM(S): at 05:38

## 2024-11-13 RX ADMIN — LOSARTAN POTASSIUM 50 MILLIGRAM(S): 25 TABLET ORAL at 05:38

## 2024-11-13 RX ADMIN — PANTOPRAZOLE SODIUM 40 MILLIGRAM(S): 40 TABLET, DELAYED RELEASE ORAL at 05:38

## 2024-11-13 RX ADMIN — Medication 1000 MILLIGRAM(S): at 15:48

## 2024-11-13 RX ADMIN — Medication 200 MILLIGRAM(S): at 05:38

## 2024-11-13 RX ADMIN — Medication 1000 MILLIGRAM(S): at 05:38

## 2024-11-13 RX ADMIN — Medication 1000 MILLIGRAM(S): at 21:20

## 2024-11-13 RX ADMIN — Medication 1000 MILLIGRAM(S): at 14:12

## 2024-11-13 NOTE — DIETITIAN INITIAL EVALUATION ADULT - PERTINENT MEDS FT
MEDICATIONS  (STANDING):  acetaminophen     Tablet .. 1000 milliGRAM(s) Oral every 8 hours  diltiazem    milliGRAM(s) Oral daily  DULoxetine 60 milliGRAM(s) Oral daily  hydroxychloroquine 200 milliGRAM(s) Oral two times a day  lactated ringers. 1000 milliLiter(s) (100 mL/Hr) IV Continuous <Continuous>  losartan 50 milliGRAM(s) Oral daily  methocarbamol 500 milliGRAM(s) Oral every 8 hours  ondansetron   Disintegrating Tablet 4 milliGRAM(s) Oral every 6 hours  pantoprazole    Tablet 40 milliGRAM(s) Oral before breakfast  polyethylene glycol 3350 17 Gram(s) Oral daily  pregabalin 50 milliGRAM(s) Oral three times a day  senna 2 Tablet(s) Oral at bedtime    MEDICATIONS  (PRN):  HYDROmorphone  Injectable 0.5 milliGRAM(s) IV Push every 4 hours PRN For breakthrough pain  HYDROmorphone  Injectable 0.5 milliGRAM(s) IV Push every 15 minutes PRN For breakthrough pain  oxyCODONE    IR 5 milliGRAM(s) Oral every 4 hours PRN Severe Pain (7 - 10)  traZODone 25 milliGRAM(s) Oral at bedtime PRN insomnia

## 2024-11-13 NOTE — DIETITIAN INITIAL EVALUATION ADULT - OTHER CALCULATIONS
Pt is within % ideal body weight, thus actual body weight used for all calculations. Needs adjusted for advanced age and clinical condition, postoperative demands.

## 2024-11-13 NOTE — DIETITIAN INITIAL EVALUATION ADULT - OTHER INFO
This is an 84 year old female, status post T9-Pelvis PSF c/b dural tear (2024) presents with increasing LBP with bilateral weaknesss upon ambulation for the past 5 days. Since the surgery, she had been free of back pain and ambulating well with cane, continuing PT. However, since 5 days ago, has been complaining of bilateral leg weakness when ambulating. Pt is now status post fusion, posterior spinal column, thoracic, posterior approach, using instrumentation 24.    Pt seen in room for nutrition assessment. Pt reported fair to  low appetite PTA and improving appetite during hospital stay. As per diet recall PTA: pt endorsed she was cooking less over the past few months since her   in August; pt stated she was ordering takeout food, cooking less, eating a little less related to decreased appetite. Pt is currently on regular diet, tolerating well overall, pt stated she enjoys the food, noted with ~65-75% PO intakes overall. No cultural, Mosque, or ethnic food preferences noted. No known food allergies. No noted significant/major wt changes, reports wt stability at current wt. Dosing wt: 141 pounds, Ideal body weight: 130 pounds, pt is ~108% of ideal body weight. No noted GI upset such as nausea, vomiting, diarrhea, constipation, last BM on 24. No noted edema. Skin: bruised (ecchymosis); surgical incisions to the back; no pressure ulcers. Tai: 18. No issues chewing or swallowing noted. No noted pain. Labs reviewed: elevated BUN (28), low eGFR (50); RD to continue to monitor trends. Nutritionally pertinent medications/supplements: IV fluids, zofran, protonix, senna, MIRALAX. Observed pt with no overt signs of muscle or fat wasting. Based on ASPEN guidelines, pt does not meet criteria for malnutrition at this time. Pt amenable to education; RD provided education in regards to the importance of adequate macro and micronutrients, as well as hydration to support ADLs, maintain energy levels and overall functional/nutritional status. General healthful education provided. Nutrient-dense foods promoted. RD discussed pt's elevated nutrient needs related to postoperative demands, emphasizing the role that protein plays in the healing process. Pt was receptive and verbalized understanding. No additional nutrition-related concerns. Will continue to follow. Additional nutrition recommendations below to follow.

## 2024-11-13 NOTE — DIETITIAN INITIAL EVALUATION ADULT - PERTINENT LABORATORY DATA
11-13    135  |  105  |  28[H]  ----------------------------<  127[H]  3.9   |  20[L]  |  1.11    Ca    8.4      13 Nov 2024 05:30

## 2024-11-13 NOTE — DIETITIAN INITIAL EVALUATION ADULT - BODY MASS INDEX
Pt arrived to the ED with c/o suicidal ideation. Pt states that he did crack and meth before he came. PT states that he was planning to jump off a casino in [place] to stop the voices. Pt has hx of schizophrenia and bipolar. Pt states that he has been without his meds since April.    22.7

## 2024-11-13 NOTE — PROGRESS NOTE ADULT - SUBJECTIVE AND OBJECTIVE BOX
24HR EVENTS:     SUBJECTIVE: Pt seen and examined on morning rounds. Pain well controlled. Patient denies CP/SOB/N/V. Feeling mildly dizzy this AM. Urinating without issue.      Vital Signs Last 24 Hrs  T(C): 36.8 (13 Nov 2024 05:04), Max: 38.1 (12 Nov 2024 09:15)  T(F): 98.2 (13 Nov 2024 05:04), Max: 100.5 (12 Nov 2024 09:15)  HR: 93 (13 Nov 2024 05:04) (73 - 106)  BP: 142/69 (13 Nov 2024 05:04) (142/69 - 173/72)  BP(mean): 106 (12 Nov 2024 18:11) (100 - 106)  RR: 18 (13 Nov 2024 05:04) (14 - 18)  SpO2: 99% (13 Nov 2024 05:04) (96% - 99%)    Parameters below as of 13 Nov 2024 05:04  Patient On (Oxygen Delivery Method): room air        Physical Exam:  General: NAD, resting comfortably in bed    RLE:  SILT L2-S1, symmetric  Motor 5/5 L2-S1, symmetric  Pulses 2+    LLE:  SILT L2-S1, symmetric  Motor 5/5 L2-S1, symmetric  Pulses 2+    HV: 25/155    Assessment/Plan:  84yF s/p OR T4-9 extension of PSF, laminectomy T8-9, exploration of fusion, WINIFRED by Dr. TONO Salguero on 11-11  - Weight Bearing Status: WBAT  - Pain control  - DVT PPx: SCDs  - PT rec: HPT  - F/u AM labs  - Dispo: HPT pending PT, drain, family want [facility], pt wants home    Jacob Lopez, PGY-1  Orthopedic Surgery

## 2024-11-13 NOTE — DIETITIAN INITIAL EVALUATION ADULT - ADD RECOMMEND
1. Continue with current diet order (regular diet)  2. Encourage pt to meet nutritional needs as able  3. Monitor PO intakes, trend weights (weekly), monitor skin integrity, monitor labs (electrolytes, CMP), monitor GI function  4. Encourage adherence to diet education (reinforce as able)   5. Pain and bowel regimen per team  6. Will continue to assess/honor preferences as able   7. Align nutrition interventions with goals of care at all times

## 2024-11-13 NOTE — PROGRESS NOTE ADULT - SUBJECTIVE AND OBJECTIVE BOX
Ortho Note    Subjective:  Pt comfortable without complaints, pain controlled with current pain medication regimen   Denies CP, SOB, N/V, numbness/tingling   Reviewed plan of care with patient at bedside    Vital Signs Last 24 Hrs  T(C): 36.6 (11-13-24 @ 10:03), Max: 36.6 (11-13-24 @ 10:03)  T(F): 97.8 (11-13-24 @ 10:03), Max: 97.8 (11-13-24 @ 10:03)  HR: 85 (11-13-24 @ 10:03) (85 - 85)  BP: 119/62 (11-13-24 @ 10:03) (119/62 - 119/62)  BP(mean): --  RR: 16 (11-13-24 @ 10:03) (16 - 16)  SpO2: 97% (11-13-24 @ 10:03) (97% - 97%)  AVSS    Objective:      Physical Exam:  General: Pt Alert and oriented, NAD  Lumbar DSG C/D/I  Pulses: +2 dorsal pedal pulses, bilateral LE  Sensation: patient continues to report numbness and tingling to bilateral feet (L4-S1). similar to preop.   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 Plan for extension of fusion T3 to prior, lami T8-T10 on Monday 11/11  - afebrile, wbcs 9.88  - Pain Control-  tylenol 1000mg PO Q8h, Dilaudid 0.5mg Q4h prn breakthrough pain, methocarbamol 500mg PO Q8h, Oxycodone 5mg PO Q4h prn severe pain, lyrica 50mg PO TID,   - DVT ppx: scds  - PT, WBS: WBAT  - appreciate medicine recs  - bowel regimen, IS use, PPI   - Dispo- home versus [facility] pending pt progression, medical clearance.     Ortho Pager [phone]

## 2024-11-13 NOTE — PROGRESS NOTE ADULT - SUBJECTIVE AND OBJECTIVE BOX
Patient is a 84y old  Female who presents with a chief complaint of LBP s/p prior spinal surgery (13 Nov 2024 06:27).     Patient seen and examined today. She reports having had a bowel movement yesterday. She denies chest pain, dyspnea, dysuria, dizziness, vomiting, abdominal pain, sore throat, cough, chills.     Allergies    venlafaxine (Unknown)  penicillin (Unknown)    Last Bowel Movement: 12-Nov-2024 (11-12-24 @ 20:40)    MEDICATIONS  (STANDING):  acetaminophen     Tablet .. 1000 milliGRAM(s) Oral every 8 hours  diltiazem    milliGRAM(s) Oral daily  DULoxetine 60 milliGRAM(s) Oral daily  hydroxychloroquine 200 milliGRAM(s) Oral two times a day  lactated ringers. 1000 milliLiter(s) (100 mL/Hr) IV Continuous <Continuous>  losartan 50 milliGRAM(s) Oral daily  methocarbamol 500 milliGRAM(s) Oral every 8 hours  ondansetron   Disintegrating Tablet 4 milliGRAM(s) Oral every 6 hours  pantoprazole    Tablet 40 milliGRAM(s) Oral before breakfast  polyethylene glycol 3350 17 Gram(s) Oral daily  pregabalin 50 milliGRAM(s) Oral three times a day  senna 2 Tablet(s) Oral at bedtime    MEDICATIONS  (PRN):  HYDROmorphone  Injectable 0.5 milliGRAM(s) IV Push every 4 hours PRN For breakthrough pain  HYDROmorphone  Injectable 0.5 milliGRAM(s) IV Push every 15 minutes PRN For breakthrough pain  oxyCODONE    IR 5 milliGRAM(s) Oral every 4 hours PRN Severe Pain (7 - 10)  traZODone 25 milliGRAM(s) Oral at bedtime PRN insomnia    Vital Signs Last 24 Hrs  T(C): 36.6 (11-13-24 @ 10:03), Max: 37.4 (11-12-24 @ 20:13)  T(F): 97.8 (11-13-24 @ 10:03), Max: 99.3 (11-12-24 @ 20:13)  HR: 85 (11-13-24 @ 10:03) (85 - 105)  BP: 119/62 (11-13-24 @ 10:03) (119/62 - 173/72)  BP(mean): 106 (11-12-24 @ 18:11) (106 - 106)  RR: 16 (11-13-24 @ 10:03) (14 - 18)  SpO2: 97% (11-13-24 @ 10:03) (96% - 99%)      I&O's Summary    12 Nov 2024 07:01  -  13 Nov 2024 07:00  --------------------------------------------------------  IN: 0 mL / OUT: 155 mL / NET: -155 mL    Oxygen Saturation Index= Unable to calculate   [Based on FiO2 = Unknown, SpO2 = 97(11/13/2024 10:03), MAP = Unknown]    PHYSICAL EXAM:  GENERAL: NAD  HEAD:  Atraumatic, Normocephalic  EYES: EOMI, conjunctiva and sclera clear  ENMT: Moist mucous membranes, no pharyngeal exudates  NECK: Supple, No JVD  NERVOUS SYSTEM:  Alert & Oriented X3, Moves extremities  CHEST/LUNG: Clear to auscultation bilaterally  HEART: normal rate and regular rhythm; Normal S1 and S2  ABDOMEN: Soft, Nontender, Nondistended; Bowel sounds present  EXTREMITIES:  back dressing c/d/i with drain in place; 2+ Peripheral Pulses  PSYCH: Normal mood and affect    Consultant(s) Notes Reviewed:  [x ] YES  [ ] NO  Care Discussed with Consultants/Other Providers [ x] YES  [ ] NO    Investigations:                        8.6    9.88  )-----------( 152      ( 13 Nov 2024 05:30 )             26.6     11-13    135  |  105  |  28[H]  ----------------------------<  127[H]  3.9   |  20[L]  |  1.11    Ca    8.4      13 Nov 2024 05:30

## 2024-11-14 LAB
ANION GAP SERPL CALC-SCNC: 10 MMOL/L — SIGNIFICANT CHANGE UP (ref 5–17)
BASOPHILS # BLD AUTO: 0.03 K/UL — SIGNIFICANT CHANGE UP (ref 0–0.2)
BASOPHILS NFR BLD AUTO: 0.3 % — SIGNIFICANT CHANGE UP (ref 0–2)
BUN SERPL-MCNC: 23 MG/DL — SIGNIFICANT CHANGE UP (ref 7–23)
CALCIUM SERPL-MCNC: 8.8 MG/DL — SIGNIFICANT CHANGE UP (ref 8.4–10.5)
CHLORIDE SERPL-SCNC: 103 MMOL/L — SIGNIFICANT CHANGE UP (ref 96–108)
CO2 SERPL-SCNC: 22 MMOL/L — SIGNIFICANT CHANGE UP (ref 22–31)
CREAT SERPL-MCNC: 1.08 MG/DL — SIGNIFICANT CHANGE UP (ref 0.5–1.3)
EGFR: 51 ML/MIN/1.73M2 — LOW
EOSINOPHIL # BLD AUTO: 0.25 K/UL — SIGNIFICANT CHANGE UP (ref 0–0.5)
EOSINOPHIL NFR BLD AUTO: 2.7 % — SIGNIFICANT CHANGE UP (ref 0–6)
GLUCOSE SERPL-MCNC: 96 MG/DL — SIGNIFICANT CHANGE UP (ref 70–99)
HCT VFR BLD CALC: 27.5 % — LOW (ref 34.5–45)
HGB BLD-MCNC: 8.6 G/DL — LOW (ref 11.5–15.5)
IMM GRANULOCYTES NFR BLD AUTO: 1 % — HIGH (ref 0–0.9)
LYMPHOCYTES # BLD AUTO: 0.71 K/UL — LOW (ref 1–3.3)
LYMPHOCYTES # BLD AUTO: 7.7 % — LOW (ref 13–44)
MCHC RBC-ENTMCNC: 29.3 PG — SIGNIFICANT CHANGE UP (ref 27–34)
MCHC RBC-ENTMCNC: 31.3 G/DL — LOW (ref 32–36)
MCV RBC AUTO: 93.5 FL — SIGNIFICANT CHANGE UP (ref 80–100)
MONOCYTES # BLD AUTO: 1.08 K/UL — HIGH (ref 0–0.9)
MONOCYTES NFR BLD AUTO: 11.8 % — SIGNIFICANT CHANGE UP (ref 2–14)
NEUTROPHILS # BLD AUTO: 7.03 K/UL — SIGNIFICANT CHANGE UP (ref 1.8–7.4)
NEUTROPHILS NFR BLD AUTO: 76.5 % — SIGNIFICANT CHANGE UP (ref 43–77)
NRBC # BLD: 0 /100 WBCS — SIGNIFICANT CHANGE UP (ref 0–0)
PLATELET # BLD AUTO: 160 K/UL — SIGNIFICANT CHANGE UP (ref 150–400)
POTASSIUM SERPL-MCNC: 4.2 MMOL/L — SIGNIFICANT CHANGE UP (ref 3.5–5.3)
POTASSIUM SERPL-SCNC: 4.2 MMOL/L — SIGNIFICANT CHANGE UP (ref 3.5–5.3)
RBC # BLD: 2.94 M/UL — LOW (ref 3.8–5.2)
RBC # FLD: 14.5 % — SIGNIFICANT CHANGE UP (ref 10.3–14.5)
SODIUM SERPL-SCNC: 135 MMOL/L — SIGNIFICANT CHANGE UP (ref 135–145)
WBC # BLD: 9.19 K/UL — SIGNIFICANT CHANGE UP (ref 3.8–10.5)
WBC # FLD AUTO: 9.19 K/UL — SIGNIFICANT CHANGE UP (ref 3.8–10.5)

## 2024-11-14 PROCEDURE — 99233 SBSQ HOSP IP/OBS HIGH 50: CPT

## 2024-11-14 RX ORDER — ENOXAPARIN SODIUM 40MG/0.4ML
40 SYRINGE (ML) SUBCUTANEOUS EVERY 24 HOURS
Refills: 0 | Status: DISCONTINUED | OUTPATIENT
Start: 2024-11-14 | End: 2024-11-15

## 2024-11-14 RX ADMIN — Medication 1000 MILLIGRAM(S): at 05:33

## 2024-11-14 RX ADMIN — OXYCODONE HYDROCHLORIDE 5 MILLIGRAM(S): 30 TABLET ORAL at 22:47

## 2024-11-14 RX ADMIN — Medication 40 MILLIGRAM(S): at 21:15

## 2024-11-14 RX ADMIN — Medication 1000 MILLIGRAM(S): at 15:49

## 2024-11-14 RX ADMIN — PANTOPRAZOLE SODIUM 40 MILLIGRAM(S): 40 TABLET, DELAYED RELEASE ORAL at 05:33

## 2024-11-14 RX ADMIN — Medication 1000 MILLIGRAM(S): at 21:14

## 2024-11-14 RX ADMIN — METHOCARBAMOL 500 MILLIGRAM(S): 500 TABLET ORAL at 19:00

## 2024-11-14 RX ADMIN — Medication 200 MILLIGRAM(S): at 05:33

## 2024-11-14 RX ADMIN — Medication 120 MILLIGRAM(S): at 05:33

## 2024-11-14 RX ADMIN — LOSARTAN POTASSIUM 50 MILLIGRAM(S): 25 TABLET ORAL at 05:34

## 2024-11-14 RX ADMIN — Medication 1000 MILLIGRAM(S): at 14:59

## 2024-11-14 RX ADMIN — Medication 200 MILLIGRAM(S): at 19:01

## 2024-11-14 RX ADMIN — DULOXETINE HYDROCHLORIDE 60 MILLIGRAM(S): 30 CAPSULE, DELAYED RELEASE ORAL at 13:21

## 2024-11-14 NOTE — PROGRESS NOTE ADULT - SUBJECTIVE AND OBJECTIVE BOX
Ortho Note    Pt seen and examined with son and daughter in law at the bedside. Pt reports soreness of the back mildly improved with tylenol. Tolerating PO diet, voiding freely. + BM yesterday.  Patient prefers to go to acute rehab, however, denied. Plan to apply for [facility] today.   Denies CP, SOB, N/V, new numbness/tingling, headache, dizziness.     Vital Signs Last 24 Hrs  T(C): 36.4 (11-14-24 @ 10:01), Max: 36.4 (11-14-24 @ 10:01)  T(F): 97.6 (11-14-24 @ 10:01), Max: 97.6 (11-14-24 @ 10:01)  HR: 78 (11-14-24 @ 10:01) (78 - 78)  BP: 120/65 (11-14-24 @ 10:01) (120/65 - 120/65)  BP(mean): --  RR: 16 (11-14-24 @ 10:01) (16 - 16)  SpO2: 96% (11-14-24 @ 10:01) (96% - 96%)  I&O's Summary    13 Nov 2024 07:01  -  14 Nov 2024 07:00  --------------------------------------------------------  IN: 0 mL / OUT: 80 mL / NET: -80 mL        General: Pt Alert and oriented, NAD  DSG C/D/I- Abd, paper tape, HV x 1  Pulses: 2+ DP bilaterally, brisk cap refill  Sensation: SILT distally bilateral lower extremities  Motor:   EHL/FHL/TA/GS: 5/5 bilaterally                          8.6    9.19  )-----------( 160      ( 14 Nov 2024 05:30 )             27.5     11-14    135  |  103  |  23  ----------------------------<  96  4.2   |  22  |  1.08    Ca    8.8      14 Nov 2024 05:30        A/P: 84yFemale POD#3 s/p extension of fusion to T3, laminectomy T8-T10 on Monday 11/11 with Dr. Salguero  - Stable, labs and vitals reviewed, Hgb stable  - Appreciate medicine recs  - Pain Control  - Continue to monitor drain output: HV 70cc overnight  - DVT ppx: SCDs, Lovenox to start tonight  - PT, WBS: PT re eval today,   - Dispo: [facility]    Ortho Pager [phone]

## 2024-11-14 NOTE — PROGRESS NOTE ADULT - SUBJECTIVE AND OBJECTIVE BOX
Patient is a 84y old  Female who presents with a chief complaint of LBP s/p prior spinal surgery (14 Nov 2024 09:59),    Patient seen and examined today. She denies worsening of back pain. She denies chest pain, dyspnea, abdominal pain, vomiting, fever.     Allergies    venlafaxine (Unknown)  penicillin (Unknown)    Last Bowel Movement: 12-Nov-2024 (11-13-24 @ 20:47)    MEDICATIONS  (STANDING):  acetaminophen     Tablet .. 1000 milliGRAM(s) Oral every 8 hours  diltiazem    milliGRAM(s) Oral daily  DULoxetine 60 milliGRAM(s) Oral daily  hydroxychloroquine 200 milliGRAM(s) Oral two times a day  lactated ringers. 1000 milliLiter(s) (100 mL/Hr) IV Continuous <Continuous>  losartan 50 milliGRAM(s) Oral daily  methocarbamol 500 milliGRAM(s) Oral every 8 hours  ondansetron   Disintegrating Tablet 4 milliGRAM(s) Oral every 6 hours  pantoprazole    Tablet 40 milliGRAM(s) Oral before breakfast  polyethylene glycol 3350 17 Gram(s) Oral daily  pregabalin 50 milliGRAM(s) Oral three times a day  senna 2 Tablet(s) Oral at bedtime    MEDICATIONS  (PRN):  HYDROmorphone  Injectable 0.5 milliGRAM(s) IV Push every 4 hours PRN For breakthrough pain  HYDROmorphone  Injectable 0.5 milliGRAM(s) IV Push every 15 minutes PRN For breakthrough pain  oxyCODONE    IR 5 milliGRAM(s) Oral every 4 hours PRN Severe Pain (7 - 10)  traZODone 25 milliGRAM(s) Oral at bedtime PRN insomnia      Vital Signs Last 24 Hrs  T(C): 36.4 (11-14-24 @ 10:01), Max: 36.9 (11-13-24 @ 15:44)  T(F): 97.6 (11-14-24 @ 10:01), Max: 98.4 (11-13-24 @ 15:44)  HR: 78 (11-14-24 @ 10:01) (78 - 89)  BP: 120/65 (11-14-24 @ 10:01) (120/65 - 148/68)  BP(mean): --  RR: 16 (11-14-24 @ 10:01) (16 - 18)  SpO2: 96% (11-14-24 @ 10:01) (96% - 98%)      I&O's Summary    13 Nov 2024 07:01  -  14 Nov 2024 07:00  --------------------------------------------------------  IN: 0 mL / OUT: 80 mL / NET: -80 mL      Oxygen Saturation Index= Unable to calculate   [Based on FiO2 = Unknown, SpO2 = 96(11/14/2024 10:01), MAP = Unknown]    PHYSICAL EXAM:  GENERAL: NAD  HEAD:  Atraumatic, Normocephalic  EYES: EOMI, conjunctiva and sclera clear  ENMT: Moist mucous membranes  NECK: Supple, No JVD  NERVOUS SYSTEM:  Alert & Oriented X3, Moves extremities  CHEST/LUNG: Clear to auscultation bilaterally  HEART: normal rate and regular rhythm; Normal S1 and S2  ABDOMEN: Soft, Nontender, Nondistended; Bowel sounds present  EXTREMITIES:  back dressing c/d/i with drain in place; 2+ Peripheral Pulses  PSYCH: Normal mood and affect    Consultant(s) Notes Reviewed:  [x ] YES  [ ] NO  Care Discussed with Consultants/Other Providers [ x] YES  [ ] NO    Investigations:                        8.6    9.19  )-----------( 160      ( 14 Nov 2024 05:30 )             27.5     11-14    135  |  103  |  23  ----------------------------<  96  4.2   |  22  |  1.08    Ca    8.8      14 Nov 2024 05:30

## 2024-11-14 NOTE — PROGRESS NOTE ADULT - SUBJECTIVE AND OBJECTIVE BOX
24HR EVENTS:     SUBJECTIVE: Pt seen and examined on morning rounds. Pain well controlled. Patient denies CP/SOB/N/V. Not dizzy this AM. Urinating without issue.      Vital Signs Last 24 Hrs  T(C): 36.6 (14 Nov 2024 05:55), Max: 36.9 (13 Nov 2024 15:44)  T(F): 97.9 (14 Nov 2024 05:55), Max: 98.4 (13 Nov 2024 15:44)  HR: 78 (14 Nov 2024 05:55) (78 - 89)  BP: 137/69 (14 Nov 2024 05:55) (119/62 - 148/68)  BP(mean): --  RR: 17 (14 Nov 2024 05:55) (16 - 18)  SpO2: 97% (14 Nov 2024 05:55) (97% - 98%)    Parameters below as of 14 Nov 2024 05:55  Patient On (Oxygen Delivery Method): room air        Physical Exam:  General: NAD, resting comfortably in bed    RLE:  SILT L2-S1, symmetric  Motor 5/5 L2-S1, symmetric  Pulses 2+    LLE:  SILT L2-S1, symmetric  Motor 5/5 L2-S1, symmetric  Pulses 2+    HV: 70/80    Assessment/Plan:  84yF s/p OR T4-9 extension of PSF, laminectomy T8-9, exploration of fusion, WINIFRED by Dr. TONO Salguero on 11-11  - Weight Bearing Status: WBAT  - Pain control  - DVT PPx: SCDs  - PT rec: [facility]  - F/u AM labs  - Dispo: [facility] Lopez, PGY-1  Orthopedic Surgery

## 2024-11-15 ENCOUNTER — TRANSCRIPTION ENCOUNTER (OUTPATIENT)
Age: 84
End: 2024-11-15

## 2024-11-15 VITALS
HEART RATE: 93 BPM | RESPIRATION RATE: 18 BRPM | DIASTOLIC BLOOD PRESSURE: 70 MMHG | SYSTOLIC BLOOD PRESSURE: 134 MMHG | OXYGEN SATURATION: 100 %

## 2024-11-15 LAB
ANION GAP SERPL CALC-SCNC: 8 MMOL/L — SIGNIFICANT CHANGE UP (ref 5–17)
BUN SERPL-MCNC: 22 MG/DL — SIGNIFICANT CHANGE UP (ref 7–23)
CALCIUM SERPL-MCNC: 9 MG/DL — SIGNIFICANT CHANGE UP (ref 8.4–10.5)
CHLORIDE SERPL-SCNC: 107 MMOL/L — SIGNIFICANT CHANGE UP (ref 96–108)
CO2 SERPL-SCNC: 24 MMOL/L — SIGNIFICANT CHANGE UP (ref 22–31)
CREAT SERPL-MCNC: 1.08 MG/DL — SIGNIFICANT CHANGE UP (ref 0.5–1.3)
EGFR: 51 ML/MIN/1.73M2 — LOW
GLUCOSE SERPL-MCNC: 103 MG/DL — HIGH (ref 70–99)
HCT VFR BLD CALC: 27.2 % — LOW (ref 34.5–45)
HGB BLD-MCNC: 8.3 G/DL — LOW (ref 11.5–15.5)
MCHC RBC-ENTMCNC: 28.8 PG — SIGNIFICANT CHANGE UP (ref 27–34)
MCHC RBC-ENTMCNC: 30.5 G/DL — LOW (ref 32–36)
MCV RBC AUTO: 94.4 FL — SIGNIFICANT CHANGE UP (ref 80–100)
NRBC # BLD: 0 /100 WBCS — SIGNIFICANT CHANGE UP (ref 0–0)
PLATELET # BLD AUTO: 180 K/UL — SIGNIFICANT CHANGE UP (ref 150–400)
POTASSIUM SERPL-MCNC: 4.9 MMOL/L — SIGNIFICANT CHANGE UP (ref 3.5–5.3)
POTASSIUM SERPL-SCNC: 4.9 MMOL/L — SIGNIFICANT CHANGE UP (ref 3.5–5.3)
RBC # BLD: 2.88 M/UL — LOW (ref 3.8–5.2)
RBC # FLD: 14.4 % — SIGNIFICANT CHANGE UP (ref 10.3–14.5)
SODIUM SERPL-SCNC: 139 MMOL/L — SIGNIFICANT CHANGE UP (ref 135–145)
WBC # BLD: 7.49 K/UL — SIGNIFICANT CHANGE UP (ref 3.8–10.5)
WBC # FLD AUTO: 7.49 K/UL — SIGNIFICANT CHANGE UP (ref 3.8–10.5)

## 2024-11-15 PROCEDURE — C1713: CPT

## 2024-11-15 PROCEDURE — 85045 AUTOMATED RETICULOCYTE COUNT: CPT

## 2024-11-15 PROCEDURE — 97165 OT EVAL LOW COMPLEX 30 MIN: CPT

## 2024-11-15 PROCEDURE — 71045 X-RAY EXAM CHEST 1 VIEW: CPT

## 2024-11-15 PROCEDURE — 86880 COOMBS TEST DIRECT: CPT

## 2024-11-15 PROCEDURE — 86901 BLOOD TYPING SEROLOGIC RH(D): CPT

## 2024-11-15 PROCEDURE — 86900 BLOOD TYPING SEROLOGIC ABO: CPT

## 2024-11-15 PROCEDURE — C1889: CPT

## 2024-11-15 PROCEDURE — 80048 BASIC METABOLIC PNL TOTAL CA: CPT

## 2024-11-15 PROCEDURE — 86850 RBC ANTIBODY SCREEN: CPT

## 2024-11-15 PROCEDURE — 82947 ASSAY GLUCOSE BLOOD QUANT: CPT

## 2024-11-15 PROCEDURE — 82728 ASSAY OF FERRITIN: CPT

## 2024-11-15 PROCEDURE — 86922 COMPATIBILITY TEST ANTIGLOB: CPT

## 2024-11-15 PROCEDURE — 82607 VITAMIN B-12: CPT

## 2024-11-15 PROCEDURE — 83540 ASSAY OF IRON: CPT

## 2024-11-15 PROCEDURE — 83550 IRON BINDING TEST: CPT

## 2024-11-15 PROCEDURE — 36415 COLL VENOUS BLD VENIPUNCTURE: CPT

## 2024-11-15 PROCEDURE — 76000 FLUOROSCOPY <1 HR PHYS/QHP: CPT

## 2024-11-15 PROCEDURE — 82805 BLOOD GASES W/O2 SATURATION: CPT

## 2024-11-15 PROCEDURE — 97161 PT EVAL LOW COMPLEX 20 MIN: CPT

## 2024-11-15 PROCEDURE — 84295 ASSAY OF SERUM SODIUM: CPT

## 2024-11-15 PROCEDURE — 93306 TTE W/DOPPLER COMPLETE: CPT

## 2024-11-15 PROCEDURE — 86905 BLOOD TYPING RBC ANTIGENS: CPT

## 2024-11-15 PROCEDURE — 97116 GAIT TRAINING THERAPY: CPT

## 2024-11-15 PROCEDURE — 86870 RBC ANTIBODY IDENTIFICATION: CPT

## 2024-11-15 PROCEDURE — 99233 SBSQ HOSP IP/OBS HIGH 50: CPT

## 2024-11-15 PROCEDURE — 84132 ASSAY OF SERUM POTASSIUM: CPT

## 2024-11-15 PROCEDURE — 81003 URINALYSIS AUTO W/O SCOPE: CPT

## 2024-11-15 PROCEDURE — 97164 PT RE-EVAL EST PLAN CARE: CPT

## 2024-11-15 PROCEDURE — 85025 COMPLETE CBC W/AUTO DIFF WBC: CPT

## 2024-11-15 PROCEDURE — 82330 ASSAY OF CALCIUM: CPT

## 2024-11-15 PROCEDURE — 85027 COMPLETE CBC AUTOMATED: CPT

## 2024-11-15 RX ORDER — PREGABALIN 150 MG/1
1 CAPSULE ORAL
Qty: 0 | Refills: 0 | DISCHARGE
Start: 2024-11-15

## 2024-11-15 RX ORDER — METHOCARBAMOL 500 MG/1
1 TABLET ORAL
Qty: 0 | Refills: 0 | DISCHARGE
Start: 2024-11-15

## 2024-11-15 RX ORDER — HYDROXYCHLOROQUINE SULFATE 200 MG
1 TABLET ORAL
Qty: 0 | Refills: 0 | DISCHARGE
Start: 2024-11-15

## 2024-11-15 RX ORDER — OXYCODONE HYDROCHLORIDE 30 MG/1
2.5 TABLET ORAL EVERY 4 HOURS
Refills: 0 | Status: DISCONTINUED | OUTPATIENT
Start: 2024-11-15 | End: 2024-11-15

## 2024-11-15 RX ORDER — POLYETHYLENE GLYCOL 3350 17 G/17G
17 POWDER, FOR SOLUTION ORAL
Qty: 0 | Refills: 0 | DISCHARGE
Start: 2024-11-15

## 2024-11-15 RX ORDER — SENNA 187 MG
2 TABLET ORAL
Qty: 0 | Refills: 0 | DISCHARGE
Start: 2024-11-15

## 2024-11-15 RX ORDER — LIDOCAINE HYDROCHLORIDE 40 MG/ML
1 SOLUTION TOPICAL ONCE
Refills: 0 | Status: COMPLETED | OUTPATIENT
Start: 2024-11-15 | End: 2024-11-15

## 2024-11-15 RX ORDER — OXYCODONE HYDROCHLORIDE 30 MG/1
0.5 TABLET ORAL
Qty: 0 | Refills: 0 | DISCHARGE
Start: 2024-11-15

## 2024-11-15 RX ORDER — ASPIRIN/MAG CARB/ALUMINUM AMIN 325 MG
81 TABLET ORAL DAILY
Refills: 0 | Status: DISCONTINUED | OUTPATIENT
Start: 2024-11-16 | End: 2024-11-15

## 2024-11-15 RX ORDER — PANTOPRAZOLE SODIUM 40 MG/1
1 TABLET, DELAYED RELEASE ORAL
Qty: 0 | Refills: 0 | DISCHARGE
Start: 2024-11-15

## 2024-11-15 RX ORDER — KETOROLAC TROMETHAMINE 30 MG/ML
15 INJECTION INTRAMUSCULAR; INTRAVENOUS ONCE
Refills: 0 | Status: DISCONTINUED | OUTPATIENT
Start: 2024-11-15 | End: 2024-11-15

## 2024-11-15 RX ADMIN — LIDOCAINE HYDROCHLORIDE 1 PATCH: 40 SOLUTION TOPICAL at 17:53

## 2024-11-15 RX ADMIN — DULOXETINE HYDROCHLORIDE 60 MILLIGRAM(S): 30 CAPSULE, DELAYED RELEASE ORAL at 11:28

## 2024-11-15 RX ADMIN — Medication 1000 MILLIGRAM(S): at 13:46

## 2024-11-15 RX ADMIN — Medication 200 MILLIGRAM(S): at 17:54

## 2024-11-15 RX ADMIN — METHOCARBAMOL 500 MILLIGRAM(S): 500 TABLET ORAL at 21:32

## 2024-11-15 RX ADMIN — PANTOPRAZOLE SODIUM 40 MILLIGRAM(S): 40 TABLET, DELAYED RELEASE ORAL at 06:07

## 2024-11-15 RX ADMIN — LOSARTAN POTASSIUM 50 MILLIGRAM(S): 25 TABLET ORAL at 06:06

## 2024-11-15 RX ADMIN — PREGABALIN 50 MILLIGRAM(S): 150 CAPSULE ORAL at 06:07

## 2024-11-15 RX ADMIN — METHOCARBAMOL 500 MILLIGRAM(S): 500 TABLET ORAL at 06:07

## 2024-11-15 RX ADMIN — Medication 40 MILLIGRAM(S): at 21:32

## 2024-11-15 RX ADMIN — Medication 120 MILLIGRAM(S): at 06:07

## 2024-11-15 RX ADMIN — METHOCARBAMOL 500 MILLIGRAM(S): 500 TABLET ORAL at 13:46

## 2024-11-15 RX ADMIN — Medication 1000 MILLIGRAM(S): at 21:32

## 2024-11-15 RX ADMIN — Medication 1000 MILLIGRAM(S): at 06:07

## 2024-11-15 RX ADMIN — Medication 200 MILLIGRAM(S): at 06:07

## 2024-11-15 RX ADMIN — PREGABALIN 50 MILLIGRAM(S): 150 CAPSULE ORAL at 21:32

## 2024-11-15 RX ADMIN — KETOROLAC TROMETHAMINE 15 MILLIGRAM(S): 30 INJECTION INTRAMUSCULAR; INTRAVENOUS at 17:58

## 2024-11-15 NOTE — DISCHARGE NOTE NURSING/CASE MANAGEMENT/SOCIAL WORK - PATIENT PORTAL LINK FT
You can access the FollowMyHealth Patient Portal offered by [facility] by registering at the following website: [email or web address]. By joining [employer]’s FollowMyHealth portal, you will also be able to view your health information using other applications (apps) compatible with our system.

## 2024-11-15 NOTE — DISCHARGE NOTE NURSING/CASE MANAGEMENT/SOCIAL WORK - NSDCPEFALRISK_GEN_ALL_CORE
For information on Fall & Injury Prevention, visit: [email or web address] OR  [email or web address] OR  https://www.cdc.gov/steadi/patient.html

## 2024-11-15 NOTE — PROGRESS NOTE ADULT - SUBJECTIVE AND OBJECTIVE BOX
Patient is a 84y old  Female who presents with a chief complaint of LBP s/p prior spinal surgery (15 Nov 2024 06:30).    Patient seen and examined today. She reports no significant worsening of her back pain. She denies headache, chest pain, blurring of vision, new focal weakness, bleeding symptoms, fever, dyspnea.     Allergies    venlafaxine (Unknown)  penicillin (Unknown)    Last Bowel Movement: 14-Nov-2024 (11-14-24 @ 20:44)    MEDICATIONS  (STANDING):  acetaminophen     Tablet .. 1000 milliGRAM(s) Oral every 8 hours  diltiazem    milliGRAM(s) Oral daily  DULoxetine 60 milliGRAM(s) Oral daily  enoxaparin Injectable 40 milliGRAM(s) SubCutaneous every 24 hours  hydroxychloroquine 200 milliGRAM(s) Oral two times a day  lactated ringers. 1000 milliLiter(s) (100 mL/Hr) IV Continuous <Continuous>  losartan 50 milliGRAM(s) Oral daily  methocarbamol 500 milliGRAM(s) Oral every 8 hours  ondansetron   Disintegrating Tablet 4 milliGRAM(s) Oral every 6 hours  pantoprazole    Tablet 40 milliGRAM(s) Oral before breakfast  polyethylene glycol 3350 17 Gram(s) Oral daily  pregabalin 50 milliGRAM(s) Oral three times a day  senna 2 Tablet(s) Oral at bedtime    MEDICATIONS  (PRN):  HYDROmorphone  Injectable 0.5 milliGRAM(s) IV Push every 4 hours PRN For breakthrough pain  HYDROmorphone  Injectable 0.5 milliGRAM(s) IV Push every 15 minutes PRN For breakthrough pain  oxyCODONE    IR 2.5 milliGRAM(s) Oral every 4 hours PRN Severe Pain (7 - 10)  traZODone 25 milliGRAM(s) Oral at bedtime PRN insomnia    Vital Signs Last 24 Hrs  T(C): 36.8 (11-15-24 @ 06:00), Max: 37.3 (11-14-24 @ 16:33)  T(F): 98.2 (11-15-24 @ 06:00), Max: 99.1 (11-14-24 @ 16:33)  HR: 60 (11-15-24 @ 06:00) (60 - 100)  BP: 154/67 (11-15-24 @ 06:00) (98/62 - 154/67)  BP(mean): --  RR: 16 (11-15-24 @ 06:00) (16 - 16)  SpO2: 97% (11-15-24 @ 06:00) (96% - 97%)    I&O's Summary    14 Nov 2024 07:01  -  15 Nov 2024 07:00  --------------------------------------------------------  IN: 0 mL / OUT: 50 mL / NET: -50 mL      Oxygen Saturation Index= Unable to calculate   [Based on FiO2 = Unknown, SpO2 = 97(11/15/2024 06:00), MAP = Unknown]    PHYSICAL EXAM:  GENERAL: NAD  HEAD:  Atraumatic, Normocephalic  EYES: EOMI, PERRL, conjunctiva and sclera clear  ENMT: Moist mucous membranes  NECK: Supple, No JVD  NERVOUS SYSTEM:  Alert & Oriented X3, Moves extremities  CHEST/LUNG: Clear to auscultation bilaterally  HEART: normal rate and regular rhythm; Normal S1 and S2  ABDOMEN: Soft, Nontender, Nondistended; Bowel sounds present  EXTREMITIES:  back dressing c/d/i; 2+ Peripheral Pulses  PSYCH: Normal mood and affect    Consultant(s) Notes Reviewed:  [x ] YES  [ ] NO  Care Discussed with Consultants/Other Providers [ x] YES  [ ] NO      Investigations:                        8.3    7.49  )-----------( 180      ( 15 Nov 2024 07:18 )             27.2     11-15    139  |  107  |  22  ----------------------------<  103[H]  4.9   |  24  |  1.08    Ca    9.0      15 Nov 2024 07:18

## 2024-11-15 NOTE — DISCHARGE NOTE NURSING/CASE MANAGEMENT/SOCIAL WORK - NSDPFAC_GEN_ALL_CORE
[facility], [address], [place] [address], P: [phone]

## 2024-11-15 NOTE — PROGRESS NOTE ADULT - REASON FOR ADMISSION
LBP s/p prior spinal surgery

## 2024-11-15 NOTE — PROGRESS NOTE ADULT - SUBJECTIVE AND OBJECTIVE BOX
Ortho Note    Patient seen and examined at bedside. Pt comfortable without complaints, pain controlled. States she took an oxycodone overnight and is still feeling a little bit tired from it. States she is amenable to trying a lower dose of oxycodone. Voiding freely, LBM yesterday.   Denies CP, SOB, N/V, new numbness/tingling     Vital Signs Last 24 Hrs  T(C): --  T(F): --  HR: --  BP: --  BP(mean): --  RR: --  SpO2: --  I&O's Summary    14 Nov 2024 07:01  -  15 Nov 2024 07:00  --------------------------------------------------------  IN: 0 mL / OUT: 50 mL / NET: -50 mL        General: Pt Alert and oriented, NAD  DSG C/D/I- Abdominal pad / paper tape  Pulses: 2+ DP pulses palpable bilaterally, skin wwp, cap refill brisk, no calf tenderness bilaterally  Sensation: SILT to L2-S1 dermatomes bilaterally  Motor:  EHL/FHL/TA/GS 5/5 bilaterally                            8.3    7.49  )-----------( 180      ( 15 Nov 2024 07:18 )             27.2     11-15    139  |  107  |  22  ----------------------------<  103[H]  4.9   |  24  |  1.08    Ca    9.0      15 Nov 2024 07:18        A/P: 85yo Female POD#4 s/p extension of fusion to T3, laminectomy T8-T10   - Stable, appreciate medical comanagement  - Pain Control  - OOB/IS  - Bowel Regimen  - DVT ppx: LVX, SCDs  - PT, WBS: WBAT  - Dispo: [facility]LYN, authorization for [facility] started today    Ortho Pager [phone]

## 2024-11-15 NOTE — DISCHARGE NOTE NURSING/CASE MANAGEMENT/SOCIAL WORK - FINANCIAL ASSISTANCE
[facility] provides services at a reduced cost to those who are determined to be eligible through [facility]’s financial assistance program. Information regarding [facility]’s financial assistance program can be found by going to [email or web address] or by calling [phone].

## 2024-11-15 NOTE — PROGRESS NOTE ADULT - ASSESSMENT
Ms. Komal Moses is an 84/F with history of HTN, CAD, HLD, CKD stage 3, erosive osteoarthritis, asthma and anxiety presenting as transfer from OSH for ortho evaluation due to concern for cord compression and planned for extension of fusion T3 to prior, lami T8-T10. She is now s/p Fusion, posterior spinal column, thoracic, posterior approach, using instrumentation 11-Nov-2024.    Recommendations:    #Thoracic cord compression  #Thoracic disc herniation  #Post op state  - management per orthopedics  - Provide adequate analgesia, Incentive spirometry, mobilize with fall precautions, bowel regimen, DVT prophylaxis  - PT/OT    # LA enlargement  - TTE showed left atrial dilation but normal EF and no significant valvular disease  - no h/o Afib  - appears euvolemic  - outpatient follow up with cardiology  - monitor orthostatic VS     #CAD  - chest pain free  - no history of MI or stents  - continue statin     #HTN  - BP improved  - provide adequate analgesia  - on diltiazem 120 mg/24 hr oral cap twice a day and Valsartan 160 mg daily at home    #HLD  - on Atorvastatin 80 mg QHS    #Asthma, not in exacerbation  - albuterol inhaler PRN    #Erosive OA  - continue home dose of Hydroxychloroquine 200 mg BID     #Chronic anemia from iron deficiency  - Tsat 6%  - consider ferrous sulfate 325 mg every other day with bowel regimen  - monitor Hgb and transfuse for Hgb < 7    #CKD stage 3  - Cr stable  - avoid nephrotoxins  - monitor renal function and electrolytes      Feel free to reach out for any questions. Recommendations discussed with primary team.         
Ms. Komal Moses is an 84/F with history of HTN, CAD, HLD, CKD stage 3, erosive osteoarthritis, asthma and anxiety presenting as transfer from OSH for ortho evaluation due to concern for cord compression and planned for extension of fusion T3 to prior, lami T8-T10. She is now s/p Fusion, posterior spinal column, thoracic, posterior approach, using instrumentation 11-Nov-2024.    Recommendations:    #Thoracic cord compression  #Thoracic disc herniation  #Post op state  - management per orthopedics  - Provide adequate analgesia, Incentive spirometry, mobilize with fall precautions, bowel regimen, DVT prophylaxis  - PT/OT    #Low grade fever, resolved  - continue incentive spirometer  - denies symptoms of infection  - Tmax 99.3F, no leucocytosis  - incentive spirometry, monitor temp and WBC trend, can send RVP and do CXR and UA if fever recurs    # LA enlargement  - TTE showed left atrial dilation but normal EF and no significant valvular disease  - no h/o Afib  - appears euvolemic  - outpatient follow up with cardiology  - monitor orthostatic VS     #CAD  - chest pain free  - no history of MI or stents  - continue statin     #HTN  - BP elevated  - provide adequate analgesia  - on diltiazem 120 mg/24 hr oral cap twice a day and Valsartan 160 mg daily at home    #HLD  - on Atorvastatin 80 mg QHS    #Asthma, not in exacerbation  - albuterol inhaler PRN    #Erosive OA  - continue home dose of Hydroxychloroquine 200 mg BID     #Chronic anemia from iron deficiency  - Tsat 6%  - consider ferrous sulfate 325 mg every other day with bowel regimen  - monitor Hgb and transfuse for Hgb < 7    #CKD stage 3  - Cr stable  - avoid nephrotoxins  - monitor renal function and electrolytes      Feel free to reach out for any questions. Recommendations discussed with primary team.         
Ms. Komal Moses is an 84/F with history of HTN, CAD, CKD stage 3, erosive osteoarthritis, asthma and anxiety presenting as transfer from OSH for ortho evaluation due to concern for cord compression and planned for extension of fusion T3 to prior, lami T8-T10.     Recommendations:    #Thoracic cord compression  - planned for exploration of fusion, PSF T3-T9  #Pre-operative risk assessment (initially evaluated 11/8 by Dr. Regalado)  - perioperative risk stratification done by Dr. Berrios 11/10/24 and patient deemed "low to intermediate risk given age and LA enlargement for intermediate risk surgery"  - management per orthopedics    # LA enlargement  - TTE showed left atrial dilation but normal EF and no significant valvular disease  - no h/o Afib  - outpatient follow up with cardiology    #CAD  - chest pain free  - no history of MI or stents  - continue statin     #HTN  - can hold losartan prior to OR and resume once cleared to resume    #Asthma, not in exacerbation  - albuterol inhaler PRN    #Erosive OA  - continue home dose of plaquenil    #Chronic anemia  - consider sending iron, TIBC, ferritin, B12, retic count    #CKD stage 3  - avoid nephrotoxins  - monitor renal function and electrolytes      Feel free to reach out for any questions. Recommendations discussed with primary team.         
Ms. Komal Moses is an 84/F with history of HTN, CAD, HLD, CKD stage 3, erosive osteoarthritis, asthma and anxiety presenting as transfer from OSH for ortho evaluation due to concern for cord compression and planned for extension of fusion T3 to prior, lami T8-T10. She is now s/p Fusion, posterior spinal column, thoracic, posterior approach, using instrumentation 11-Nov-2024.    Recommendations:    #Thoracic cord compression  #Thoracic disc herniation  #Post op state  - management per orthopedics  - Provide adequate analgesia, Incentive spirometry, mobilize with fall precautions, bowel regimen, DVT prophylaxis  - PT/OT    #Low grade fever   - denies symptoms of infection  - Tmax 100.5F  - WBC 9  - incentive spirometry, monitor temp and WBC trend, can send RVP and do CXR and UA if fever persists    # LA enlargement  - TTE showed left atrial dilation but normal EF and no significant valvular disease  - no h/o Afib  - appears euvolemic  - outpatient follow up with cardiology  - monitor orthostatic VS     #CAD  - chest pain free  - no history of MI or stents  - continue statin     #HTN  - BP elevated  - provide adequate analgesia  - per med list in the EHR, she takes diltiazem 120 mg/24 hr oral cap twice a day and Valsartan 160 mg daily   - primary team to do accurate med rec of home medications and once confirmed consider resuming Diltiazem and Valsartan (can substitute Losartan 50 mg daily if nonformulary) with BP parameters once cleared to resume by primary team    #HLD  - on Atorvastatin 80 mg QHS    #Asthma, not in exacerbation  - albuterol inhaler PRN    #Erosive OA  - continue home dose of Hydroxychloroquine 200 mg BID if ok to resume from orthopedics perspective post op    #Chronic anemia  - consider sending iron, TIBC, ferritin, B12, retic count    #CKD stage 3  - Cr stable  - avoid nephrotoxins  - monitor renal function and electrolytes      Feel free to reach out for any questions. Recommendations discussed with primary team.         
Ms. Komal Moses is an 84/F with history of HTN, CAD, HLD, CKD stage 3, erosive osteoarthritis, asthma and anxiety presenting as transfer from OSH for ortho evaluation due to concern for cord compression and planned for extension of fusion T3 to prior, lami T8-T10. She is now s/p Fusion, posterior spinal column, thoracic, posterior approach, using instrumentation 11-Nov-2024.    Recommendations:    #Thoracic cord compression  #Thoracic disc herniation  #Post op state  - pain controlled  - management per orthopedics  - Provide adequate analgesia, Incentive spirometry, mobilize with fall precautions, bowel regimen, DVT prophylaxis  - PT/OT    # LA enlargement  - TTE showed left atrial dilation but normal EF and no significant valvular disease  - appears euvolemic  - outpatient follow up with cardiology  - monitor orthostatic VS     #CAD  - chest pain free  - continue statin     #HTN  - provide adequate analgesia  - on diltiazem 120 mg/24 hr oral cap twice a day and Valsartan 160 mg daily at home  - if BP rises despite adequate pain control and remains elevated, can increase Losartan to 100 mg daily    #HLD  - on Atorvastatin 80 mg QHS    #Asthma, not in exacerbation  - albuterol inhaler PRN    #Erosive OA  - continue home dose of Hydroxychloroquine 200 mg BID     #Chronic anemia from iron deficiency  - Tsat 6%  - consider ferrous sulfate 325 mg every other day with bowel regimen  - monitor Hgb and transfuse for Hgb < 7    #CKD stage 3  - Cr stable  - avoid nephrotoxins  - monitor renal function and electrolytes      Feel free to reach out for any questions. Recommendations discussed with primary team.         
84F with PMH of arthritis, HTN, CAD, CKD3, erosive osteoarthritis, asthma and anxiety presenting as transfer from OSH for ortho evaluation due to concern for cord compression, pending OR on 11/11 for extension of fusion T3 to prior, lami T8-T10. Medicine consulted for comanagement.     #Pre-operative risk assessment (initially evaluated 11/8 by Dr. Regalado)  - able to exercise without dyspnea, but has somewhat chronic shortness of breath although not precipitated by activity. Appears to be precipitated by emotional stress.  - EKG NSR, normal NC interval and QTC, no KATHLEEN  - troponin at outside hospital negative  - no PE on CTA  - dopplers negative  - TTE showed left atrial enlargement but normal EF and no significant valvular disease. No h/o Afib.  - 4+ METS  - RCRI (no h/o MI, CVA, DM/insulin, Cr>2): Class I risk. 3.9% 30 day risk of death, MI, cardiac arrest  - CEJA 0.2% risk for myocardial infarction or cardiac arrest, intraoperatively or up to 30 days post-op  - Patient is low to intermediate risk given age and LA enlargement - for intermediate risk surgery    # LA enlargement  - TTE showed left atrial dilation but normal EF and no significant valvular disease.   - no h/o Afib  - outpatient follow up with cardiology  - control hypertension and monitor for signs of overload. currently euvolemic.    #CAD  - no history of MI or stents  - EKG nonischemic  - continue statin     #HTN  - hold losartan prior to OR    #Asthma  - clear lungs, no acute exacerbation - albuterol inhaler PRN    #Erosive OA  - continue home dose of plaquenil  
84F with PMH of arthritis, HTN, CAD, CKD3, erosive osteoarthritis, asthma and anxiety presenting as transfer from OSH for ortho evaluation due to concern for cord compression, pending OR on 11/11 for extension of fusion T3 to prior, lami T8-T10. Medicine consulted for comanagement.     # LA enlargement  - TTE showed left atrial dilation but normal EF and no significant valvular disease.   - outpatient follow up with cardiology  - control hypertension and monitor for signs of overload. currently euvolemic.    #CAD  - no history of MI or stents  - EKG nonischemic  - continue statin     #HTN  - hold losartan prior to OR    #Asthma  - clear lungs, no acute exacerbation - albuterol inhaler PRN    #Erosive OA  - continue home dose of plaquenil

## 2024-11-15 NOTE — PROGRESS NOTE ADULT - TIME BILLING
Bedside exam and interview   Reviewed vitals, labs   Discussed patient's plan of care with housestaff   Documentation of encounter
Bedside exam and interview   Reviewed vitals, labs   Discussed patient's plan of care with housestaff   Documentation of encounter
Bedside exam and interview.  Reviewed of laboratory data, radiology results, consultants' recommendations.   Discussion with patient/caregivers/housestaff and interdisciplinary staff (such as , social workers, etc).  Documentation of encounter.  Interventions were performed as documented above.  Excludes teaching time and/or separately reported services.
Bedside exam and interview   Reviewed vitals, labs   Discussed patient's plan of care with housestaff   Documentation of encounter
Bedside exam and interview   Reviewed vitals, labs   Discussed patient's plan of care with housestaff   Documentation of encounter
Bedside exam and interview.  Reviewed of laboratory data, radiology results, consultants' recommendations.   Discussion with patient/caregivers/housestaff and interdisciplinary staff (such as , social workers, etc).  Documentation of encounter.  Interventions were performed as documented above.  Excludes teaching time and/or separately reported services.
Bedside exam and interview   Reviewed vitals, labs   Discussed patient's plan of care with housestaff   Documentation of encounter

## 2024-11-15 NOTE — PROGRESS NOTE ADULT - PROVIDER SPECIALTY LIST ADULT
Hospitalist
Orthopedics
Hospitalist
Orthopedics
Hospitalist
Hospitalist

## 2024-11-15 NOTE — PROGRESS NOTE ADULT - SUBJECTIVE AND OBJECTIVE BOX
24HR EVENTS:     SUBJECTIVE: Pt seen and examined on morning rounds. Pain well controlled. Patient denies CP/SOB/N/V. Urinating without issue.      Vital Signs Last 24 Hrs  T(C): 36.9 (14 Nov 2024 19:54), Max: 37.3 (14 Nov 2024 16:33)  T(F): 98.4 (14 Nov 2024 19:54), Max: 99.1 (14 Nov 2024 16:33)  HR: 92 (14 Nov 2024 19:54) (72 - 100)  BP: 147/68 (14 Nov 2024 19:54) (98/62 - 147/68)  BP(mean): --  RR: 16 (14 Nov 2024 19:54) (16 - 16)  SpO2: 97% (14 Nov 2024 19:54) (96% - 97%)    Parameters below as of 14 Nov 2024 19:54  Patient On (Oxygen Delivery Method): room air        Physical Exam:  General: NAD, resting comfortably in bed    RLE:  SILT L2-S1, symmetric  Motor 5/5 L2-S1, symmetric  Pulses 2+    LLE:  SILT L2-S1, symmetric  Motor 5/5 L2-S1, symmetric  Pulses 2+      Assessment/Plan:  84yF s/p OR T4-9 extension of PSF, laminectomy T8-9, exploration of fusion, WINIFRED by Dr. TONO Salguero on 11-11  - Weight Bearing Status: WBAT  - Pain control  - DVT PPx: SCDs  - PT rec: [facility]N  - F/u AM labs  - Dispo: CAITLYN Lopez, PGY-1  Orthopedic Surgery

## 2024-11-18 DIAGNOSIS — Z88.0 ALLERGY STATUS TO PENICILLIN: ICD-10-CM

## 2024-11-18 DIAGNOSIS — M48.04 SPINAL STENOSIS, THORACIC REGION: ICD-10-CM

## 2024-11-18 DIAGNOSIS — Z79.82 LONG TERM (CURRENT) USE OF ASPIRIN: ICD-10-CM

## 2024-11-18 DIAGNOSIS — E78.5 HYPERLIPIDEMIA, UNSPECIFIED: ICD-10-CM

## 2024-11-18 DIAGNOSIS — G95.19 OTHER VASCULAR MYELOPATHIES: ICD-10-CM

## 2024-11-18 DIAGNOSIS — N18.30 CHRONIC KIDNEY DISEASE, STAGE 3 UNSPECIFIED: ICD-10-CM

## 2024-11-18 DIAGNOSIS — M19.90 UNSPECIFIED OSTEOARTHRITIS, UNSPECIFIED SITE: ICD-10-CM

## 2024-11-18 DIAGNOSIS — I12.9 HYPERTENSIVE CHRONIC KIDNEY DISEASE WITH STAGE 1 THROUGH STAGE 4 CHRONIC KIDNEY DISEASE, OR UNSPECIFIED CHRONIC KIDNEY DISEASE: ICD-10-CM

## 2024-11-18 DIAGNOSIS — D50.9 IRON DEFICIENCY ANEMIA, UNSPECIFIED: ICD-10-CM

## 2024-11-18 DIAGNOSIS — Z88.8 ALLERGY STATUS TO OTHER DRUGS, MEDICAMENTS AND BIOLOGICAL SUBSTANCES: ICD-10-CM

## 2024-11-18 DIAGNOSIS — I25.10 ATHEROSCLEROTIC HEART DISEASE OF NATIVE CORONARY ARTERY WITHOUT ANGINA PECTORIS: ICD-10-CM

## 2024-11-18 DIAGNOSIS — Z79.899 OTHER LONG TERM (CURRENT) DRUG THERAPY: ICD-10-CM

## 2024-11-18 DIAGNOSIS — I77.819 AORTIC ECTASIA, UNSPECIFIED SITE: ICD-10-CM

## 2024-11-18 DIAGNOSIS — M51.04 INTERVERTEBRAL DISC DISORDERS WITH MYELOPATHY, THORACIC REGION: ICD-10-CM

## 2024-11-18 DIAGNOSIS — J45.909 UNSPECIFIED ASTHMA, UNCOMPLICATED: ICD-10-CM

## 2024-11-18 DIAGNOSIS — M40.204 UNSPECIFIED KYPHOSIS, THORACIC REGION: ICD-10-CM

## 2024-11-26 ENCOUNTER — APPOINTMENT (OUTPATIENT)
Dept: ORTHOPEDIC SURGERY | Facility: CLINIC | Age: 84
End: 2024-11-26

## 2024-11-26 PROCEDURE — 99024 POSTOP FOLLOW-UP VISIT: CPT

## 2024-12-17 ENCOUNTER — APPOINTMENT (OUTPATIENT)
Dept: ORTHOPEDIC SURGERY | Facility: CLINIC | Age: 84
End: 2024-12-17
Payer: MEDICARE

## 2024-12-17 VITALS — HEART RATE: 93 BPM | DIASTOLIC BLOOD PRESSURE: 67 MMHG | SYSTOLIC BLOOD PRESSURE: 128 MMHG | OXYGEN SATURATION: 94 %

## 2024-12-17 DIAGNOSIS — S22.000D WEDGE COMPRESSION FRACTURE OF UNSPECIFIED THORACIC VERTEBRA, SUBSEQUENT ENCOUNTER FOR FRACTURE WITH ROUTINE HEALING: ICD-10-CM

## 2024-12-17 DIAGNOSIS — K59.00 CONSTIPATION, UNSPECIFIED: ICD-10-CM

## 2024-12-17 DIAGNOSIS — M48.04 SPINAL STENOSIS, THORACIC REGION: ICD-10-CM

## 2024-12-17 DIAGNOSIS — M54.14 SPINAL STENOSIS, THORACIC REGION: ICD-10-CM

## 2024-12-17 PROCEDURE — 99212 OFFICE O/P EST SF 10 MIN: CPT

## 2024-12-17 PROCEDURE — 72082 X-RAY EXAM ENTIRE SPI 2/3 VW: CPT

## 2024-12-17 RX ORDER — SENNOSIDES 8.6 MG TABLETS 8.6 MG/1
8.6 TABLET ORAL
Qty: 10 | Refills: 0 | Status: ACTIVE | COMMUNITY
Start: 2024-12-17 | End: 1900-01-01

## 2024-12-17 RX ORDER — HYDROMORPHONE HYDROCHLORIDE 4 MG/1
4 TABLET ORAL
Qty: 30 | Refills: 0 | Status: ACTIVE | COMMUNITY
Start: 2024-12-17 | End: 1900-01-01

## 2024-12-17 RX ORDER — NALOXONE HYDROCHLORIDE NASAL 4 MG/.1ML
4 SPRAY NASAL
Qty: 1 | Refills: 0 | Status: ACTIVE | COMMUNITY
Start: 2024-12-17 | End: 1900-01-01

## 2024-12-20 ENCOUNTER — INPATIENT (INPATIENT)
Facility: HOSPITAL | Age: 84
LOS: 14 days | Discharge: NURSING, REHAB OR LONG-TERM CARE | End: 2025-01-04
Payer: MEDICARE

## 2024-12-20 ENCOUNTER — TRANSCRIPTION ENCOUNTER (OUTPATIENT)
Age: 84
End: 2024-12-20

## 2024-12-20 VITALS
SYSTOLIC BLOOD PRESSURE: 164 MMHG | RESPIRATION RATE: 14 BRPM | HEART RATE: 82 BPM | OXYGEN SATURATION: 94 % | TEMPERATURE: 98 F | DIASTOLIC BLOOD PRESSURE: 81 MMHG

## 2024-12-20 DIAGNOSIS — J45.909 UNSPECIFIED ASTHMA, UNCOMPLICATED: ICD-10-CM

## 2024-12-20 DIAGNOSIS — Z98.890 OTHER SPECIFIED POSTPROCEDURAL STATES: Chronic | ICD-10-CM

## 2024-12-20 DIAGNOSIS — D05.12 INTRADUCTAL CARCINOMA IN SITU OF LEFT BREAST: Chronic | ICD-10-CM

## 2024-12-20 DIAGNOSIS — N18.30 CHRONIC KIDNEY DISEASE, STAGE 3 UNSPECIFIED: ICD-10-CM

## 2024-12-20 DIAGNOSIS — Z96.643 PRESENCE OF ARTIFICIAL HIP JOINT, BILATERAL: Chronic | ICD-10-CM

## 2024-12-20 DIAGNOSIS — E78.5 HYPERLIPIDEMIA, UNSPECIFIED: ICD-10-CM

## 2024-12-20 DIAGNOSIS — I10 ESSENTIAL (PRIMARY) HYPERTENSION: ICD-10-CM

## 2024-12-20 DIAGNOSIS — Z98.49 CATARACT EXTRACTION STATUS, UNSPECIFIED EYE: Chronic | ICD-10-CM

## 2024-12-20 RX ORDER — OXYCODONE HCL 15 MG
5 TABLET ORAL EVERY 4 HOURS
Refills: 0 | Status: DISCONTINUED | OUTPATIENT
Start: 2024-12-20 | End: 2024-12-21

## 2024-12-20 RX ORDER — METHOCARBAMOL 500 MG
500 TABLET ORAL EVERY 8 HOURS
Refills: 0 | Status: DISCONTINUED | OUTPATIENT
Start: 2024-12-20 | End: 2024-12-23

## 2024-12-20 RX ORDER — CHLORHEXIDINE GLUCONATE 1.2 MG/ML
1 RINSE ORAL EVERY 12 HOURS
Refills: 0 | Status: COMPLETED | OUTPATIENT
Start: 2024-12-22 | End: 2024-12-23

## 2024-12-20 RX ORDER — POVIDONE IODINE USP, 10% W/W 10 MG/ML
1 SWAB TOPICAL ONCE
Refills: 0 | Status: COMPLETED | OUTPATIENT
Start: 2024-12-22 | End: 2024-12-23

## 2024-12-20 RX ORDER — DILTIAZEM HYDROCHLORIDE 300 MG/1
120 CAPSULE, COATED, EXTENDED RELEASE ORAL DAILY
Refills: 0 | Status: DISCONTINUED | OUTPATIENT
Start: 2024-12-20 | End: 2024-12-23

## 2024-12-20 RX ORDER — APREPITANT 40 MG/1
40 CAPSULE ORAL ONCE
Refills: 0 | Status: COMPLETED | OUTPATIENT
Start: 2024-12-22 | End: 2024-12-23

## 2024-12-20 RX ORDER — ACETAMINOPHEN 80 MG/.8ML
1000 SOLUTION/ DROPS ORAL EVERY 8 HOURS
Refills: 0 | Status: DISCONTINUED | OUTPATIENT
Start: 2024-12-20 | End: 2024-12-23

## 2024-12-20 RX ORDER — HYDROMORPHONE HCL 4 MG
0.5 TABLET ORAL EVERY 4 HOURS
Refills: 0 | Status: DISCONTINUED | OUTPATIENT
Start: 2024-12-20 | End: 2024-12-21

## 2024-12-20 RX ORDER — DULOXETINE HYDROCHLORIDE 30 MG/1
30 CAPSULE, DELAYED RELEASE ORAL DAILY
Refills: 0 | Status: DISCONTINUED | OUTPATIENT
Start: 2024-12-20 | End: 2024-12-23

## 2024-12-20 RX ORDER — ALBUTEROL SULFATE 90 UG/1
2 INHALANT RESPIRATORY (INHALATION) EVERY 6 HOURS
Refills: 0 | Status: DISCONTINUED | OUTPATIENT
Start: 2024-12-20 | End: 2024-12-23

## 2024-12-20 RX ORDER — OXYCODONE HCL 15 MG
10 TABLET ORAL EVERY 6 HOURS
Refills: 0 | Status: DISCONTINUED | OUTPATIENT
Start: 2024-12-20 | End: 2024-12-21

## 2024-12-20 RX ORDER — PREGABALIN 100 MG/1
50 CAPSULE ORAL THREE TIMES A DAY
Refills: 0 | Status: DISCONTINUED | OUTPATIENT
Start: 2024-12-20 | End: 2024-12-23

## 2024-12-20 RX ORDER — ONDANSETRON 4 MG/1
4 TABLET ORAL EVERY 6 HOURS
Refills: 0 | Status: DISCONTINUED | OUTPATIENT
Start: 2024-12-20 | End: 2024-12-22

## 2024-12-20 RX ADMIN — Medication 10 MILLIGRAM(S): at 23:59

## 2024-12-20 NOTE — H&P ADULT - HISTORY OF PRESENT ILLNESS
84F c/o back pain x       Present for WINIFRED, Exploration of fusion, extension of fusion to C7 (C7-T3)

## 2024-12-20 NOTE — H&P ADULT - NSICDXPASTMEDICALHX_GEN_ALL_CORE_FT
PAST MEDICAL HISTORY:  Anxiety     Asthma     CAD (coronary artery disease)     HLD (hyperlipidemia)     HTN (hypertension)     OA (osteoarthritis of spine)     Stage 3 chronic kidney disease

## 2024-12-20 NOTE — H&P ADULT - PROBLEM SELECTOR PLAN 1
Admit to Orthopaedic Service.  Presents today for elective removal of hardware, exploration of fusion, extension of fusion to C7 (C7 to T3)

## 2024-12-21 LAB
ALBUMIN SERPL ELPH-MCNC: 3.6 G/DL — SIGNIFICANT CHANGE UP (ref 3.3–5)
ALP SERPL-CCNC: 128 U/L — HIGH (ref 40–120)
ALT FLD-CCNC: 9 U/L — LOW (ref 10–45)
ANION GAP SERPL CALC-SCNC: 10 MMOL/L — SIGNIFICANT CHANGE UP (ref 5–17)
ANION GAP SERPL CALC-SCNC: 10 MMOL/L — SIGNIFICANT CHANGE UP (ref 5–17)
AST SERPL-CCNC: 21 U/L — SIGNIFICANT CHANGE UP (ref 10–40)
BASOPHILS # BLD AUTO: 0.04 K/UL — SIGNIFICANT CHANGE UP (ref 0–0.2)
BASOPHILS # BLD AUTO: 0.04 K/UL — SIGNIFICANT CHANGE UP (ref 0–0.2)
BASOPHILS NFR BLD AUTO: 0.7 % — SIGNIFICANT CHANGE UP (ref 0–2)
BASOPHILS NFR BLD AUTO: 0.8 % — SIGNIFICANT CHANGE UP (ref 0–2)
BILIRUB SERPL-MCNC: 0.3 MG/DL — SIGNIFICANT CHANGE UP (ref 0.2–1.2)
BUN SERPL-MCNC: 24 MG/DL — HIGH (ref 7–23)
BUN SERPL-MCNC: 27 MG/DL — HIGH (ref 7–23)
CALCIUM SERPL-MCNC: 9.6 MG/DL — SIGNIFICANT CHANGE UP (ref 8.4–10.5)
CALCIUM SERPL-MCNC: 9.8 MG/DL — SIGNIFICANT CHANGE UP (ref 8.4–10.5)
CHLORIDE SERPL-SCNC: 102 MMOL/L — SIGNIFICANT CHANGE UP (ref 96–108)
CHLORIDE SERPL-SCNC: 102 MMOL/L — SIGNIFICANT CHANGE UP (ref 96–108)
CO2 SERPL-SCNC: 26 MMOL/L — SIGNIFICANT CHANGE UP (ref 22–31)
CO2 SERPL-SCNC: 26 MMOL/L — SIGNIFICANT CHANGE UP (ref 22–31)
CREAT SERPL-MCNC: 1.11 MG/DL — SIGNIFICANT CHANGE UP (ref 0.5–1.3)
CREAT SERPL-MCNC: 1.19 MG/DL — SIGNIFICANT CHANGE UP (ref 0.5–1.3)
EGFR: 46 ML/MIN/1.73M2 — LOW
EGFR: 50 ML/MIN/1.73M2 — LOW
EOSINOPHIL # BLD AUTO: 0.21 K/UL — SIGNIFICANT CHANGE UP (ref 0–0.5)
EOSINOPHIL # BLD AUTO: 0.23 K/UL — SIGNIFICANT CHANGE UP (ref 0–0.5)
EOSINOPHIL NFR BLD AUTO: 4 % — SIGNIFICANT CHANGE UP (ref 0–6)
EOSINOPHIL NFR BLD AUTO: 4.1 % — SIGNIFICANT CHANGE UP (ref 0–6)
GLUCOSE SERPL-MCNC: 109 MG/DL — HIGH (ref 70–99)
GLUCOSE SERPL-MCNC: 109 MG/DL — HIGH (ref 70–99)
HCT VFR BLD CALC: 26.9 % — LOW (ref 34.5–45)
HCT VFR BLD CALC: 27.4 % — LOW (ref 34.5–45)
HGB BLD-MCNC: 8.4 G/DL — LOW (ref 11.5–15.5)
HGB BLD-MCNC: 8.6 G/DL — LOW (ref 11.5–15.5)
IMM GRANULOCYTES NFR BLD AUTO: 0.3 % — SIGNIFICANT CHANGE UP (ref 0–0.9)
IMM GRANULOCYTES NFR BLD AUTO: 0.4 % — SIGNIFICANT CHANGE UP (ref 0–0.9)
LYMPHOCYTES # BLD AUTO: 0.77 K/UL — LOW (ref 1–3.3)
LYMPHOCYTES # BLD AUTO: 0.97 K/UL — LOW (ref 1–3.3)
LYMPHOCYTES # BLD AUTO: 13.4 % — SIGNIFICANT CHANGE UP (ref 13–44)
LYMPHOCYTES # BLD AUTO: 18.8 % — SIGNIFICANT CHANGE UP (ref 13–44)
MCHC RBC-ENTMCNC: 28.6 PG — SIGNIFICANT CHANGE UP (ref 27–34)
MCHC RBC-ENTMCNC: 28.8 PG — SIGNIFICANT CHANGE UP (ref 27–34)
MCHC RBC-ENTMCNC: 31.2 G/DL — LOW (ref 32–36)
MCHC RBC-ENTMCNC: 31.4 G/DL — LOW (ref 32–36)
MCV RBC AUTO: 91.5 FL — SIGNIFICANT CHANGE UP (ref 80–100)
MCV RBC AUTO: 91.6 FL — SIGNIFICANT CHANGE UP (ref 80–100)
MONOCYTES # BLD AUTO: 0.53 K/UL — SIGNIFICANT CHANGE UP (ref 0–0.9)
MONOCYTES # BLD AUTO: 0.56 K/UL — SIGNIFICANT CHANGE UP (ref 0–0.9)
MONOCYTES NFR BLD AUTO: 10.8 % — SIGNIFICANT CHANGE UP (ref 2–14)
MONOCYTES NFR BLD AUTO: 9.2 % — SIGNIFICANT CHANGE UP (ref 2–14)
NEUTROPHILS # BLD AUTO: 3.37 K/UL — SIGNIFICANT CHANGE UP (ref 1.8–7.4)
NEUTROPHILS # BLD AUTO: 4.14 K/UL — SIGNIFICANT CHANGE UP (ref 1.8–7.4)
NEUTROPHILS NFR BLD AUTO: 65.1 % — SIGNIFICANT CHANGE UP (ref 43–77)
NEUTROPHILS NFR BLD AUTO: 72.4 % — SIGNIFICANT CHANGE UP (ref 43–77)
NRBC # BLD: 0 /100 WBCS — SIGNIFICANT CHANGE UP (ref 0–0)
NRBC # BLD: 0 /100 WBCS — SIGNIFICANT CHANGE UP (ref 0–0)
PLATELET # BLD AUTO: 219 K/UL — SIGNIFICANT CHANGE UP (ref 150–400)
PLATELET # BLD AUTO: 233 K/UL — SIGNIFICANT CHANGE UP (ref 150–400)
POTASSIUM SERPL-MCNC: 4.4 MMOL/L — SIGNIFICANT CHANGE UP (ref 3.5–5.3)
POTASSIUM SERPL-MCNC: 4.8 MMOL/L — SIGNIFICANT CHANGE UP (ref 3.5–5.3)
POTASSIUM SERPL-SCNC: 4.4 MMOL/L — SIGNIFICANT CHANGE UP (ref 3.5–5.3)
POTASSIUM SERPL-SCNC: 4.8 MMOL/L — SIGNIFICANT CHANGE UP (ref 3.5–5.3)
PROT SERPL-MCNC: 6.5 G/DL — SIGNIFICANT CHANGE UP (ref 6–8.3)
RBC # BLD: 2.94 M/UL — LOW (ref 3.8–5.2)
RBC # BLD: 2.99 M/UL — LOW (ref 3.8–5.2)
RBC # FLD: 13.7 % — SIGNIFICANT CHANGE UP (ref 10.3–14.5)
RBC # FLD: 13.7 % — SIGNIFICANT CHANGE UP (ref 10.3–14.5)
SODIUM SERPL-SCNC: 138 MMOL/L — SIGNIFICANT CHANGE UP (ref 135–145)
SODIUM SERPL-SCNC: 138 MMOL/L — SIGNIFICANT CHANGE UP (ref 135–145)
WBC # BLD: 5.17 K/UL — SIGNIFICANT CHANGE UP (ref 3.8–10.5)
WBC # BLD: 5.73 K/UL — SIGNIFICANT CHANGE UP (ref 3.8–10.5)
WBC # FLD AUTO: 5.17 K/UL — SIGNIFICANT CHANGE UP (ref 3.8–10.5)
WBC # FLD AUTO: 5.73 K/UL — SIGNIFICANT CHANGE UP (ref 3.8–10.5)

## 2024-12-21 PROCEDURE — 99223 1ST HOSP IP/OBS HIGH 75: CPT

## 2024-12-21 RX ORDER — OXYCODONE HCL 15 MG
10 TABLET ORAL EVERY 4 HOURS
Refills: 0 | Status: DISCONTINUED | OUTPATIENT
Start: 2024-12-21 | End: 2024-12-23

## 2024-12-21 RX ORDER — OXYCODONE HCL 15 MG
5 TABLET ORAL EVERY 4 HOURS
Refills: 0 | Status: DISCONTINUED | OUTPATIENT
Start: 2024-12-21 | End: 2024-12-23

## 2024-12-21 RX ORDER — SODIUM CHLORIDE 9 MG/ML
1000 INJECTION, SOLUTION INTRAVENOUS
Refills: 0 | Status: DISCONTINUED | OUTPATIENT
Start: 2024-12-21 | End: 2024-12-23

## 2024-12-21 RX ORDER — HYDROMORPHONE HCL 4 MG
0.5 TABLET ORAL
Refills: 0 | Status: DISCONTINUED | OUTPATIENT
Start: 2024-12-21 | End: 2024-12-23

## 2024-12-21 RX ADMIN — ACETAMINOPHEN 1000 MILLIGRAM(S): 80 SOLUTION/ DROPS ORAL at 13:09

## 2024-12-21 RX ADMIN — ACETAMINOPHEN 1000 MILLIGRAM(S): 80 SOLUTION/ DROPS ORAL at 23:31

## 2024-12-21 RX ADMIN — ACETAMINOPHEN 1000 MILLIGRAM(S): 80 SOLUTION/ DROPS ORAL at 22:31

## 2024-12-21 RX ADMIN — Medication 0.5 MILLIGRAM(S): at 05:07

## 2024-12-21 RX ADMIN — DULOXETINE HYDROCHLORIDE 30 MILLIGRAM(S): 30 CAPSULE, DELAYED RELEASE ORAL at 11:31

## 2024-12-21 RX ADMIN — Medication 10 MILLIGRAM(S): at 15:06

## 2024-12-21 RX ADMIN — Medication 500 MILLIGRAM(S): at 22:32

## 2024-12-21 RX ADMIN — PREGABALIN 50 MILLIGRAM(S): 100 CAPSULE ORAL at 13:09

## 2024-12-21 RX ADMIN — Medication 0.5 MILLIGRAM(S): at 01:19

## 2024-12-21 RX ADMIN — PREGABALIN 50 MILLIGRAM(S): 100 CAPSULE ORAL at 05:07

## 2024-12-21 RX ADMIN — Medication 500 MILLIGRAM(S): at 02:59

## 2024-12-21 RX ADMIN — Medication 10 MILLIGRAM(S): at 10:34

## 2024-12-21 RX ADMIN — Medication 10 MILLIGRAM(S): at 09:34

## 2024-12-21 RX ADMIN — DILTIAZEM HYDROCHLORIDE 120 MILLIGRAM(S): 300 CAPSULE, COATED, EXTENDED RELEASE ORAL at 00:34

## 2024-12-21 RX ADMIN — ACETAMINOPHEN 1000 MILLIGRAM(S): 80 SOLUTION/ DROPS ORAL at 05:07

## 2024-12-21 RX ADMIN — Medication 5 MILLIGRAM(S): at 23:31

## 2024-12-21 RX ADMIN — Medication 500 MILLIGRAM(S): at 13:09

## 2024-12-21 RX ADMIN — Medication 0.5 MILLIGRAM(S): at 08:24

## 2024-12-21 RX ADMIN — Medication 5 MILLIGRAM(S): at 22:31

## 2024-12-21 RX ADMIN — Medication 10 MILLIGRAM(S): at 04:06

## 2024-12-21 RX ADMIN — Medication 10 MILLIGRAM(S): at 14:06

## 2024-12-21 RX ADMIN — Medication 0.5 MILLIGRAM(S): at 08:39

## 2024-12-21 RX ADMIN — ACETAMINOPHEN 1000 MILLIGRAM(S): 80 SOLUTION/ DROPS ORAL at 14:09

## 2024-12-21 NOTE — CONSULT NOTE ADULT - ASSESSMENT
84 year old female with a pmh of HTN, CAD, HLD, CKD stage 3, Erosive osteoarthritis, asthma and anxiety who is known to our orthopedic surgery with recent admission and spinal surgery in Nov. 2024, now returns to [facility] with severe back pain and is pending OR on Monday for removal of hardware, exploration of fusion and extension of fusion to C7 (C7 to T3).  Medicine is consulted for comanagement.     # pre-operative risk assessment  VSS.  Labs reviewed.  Prior to last month, normal METS>4, currently reports not able to be active due to back pain.   Able to exercise without dyspnea. Has chronic bouts of shortness of breath which are not precipitated by activity, likely precipitated by emotional stress. Assessed by cardiology on last admission in Nov. 2024.   No known adverse reactions to anesthesia in the past in self or first-degree relatives.   - TTE showed left atrial enlargement but normal EF and no significant valvular disease. No h/o Afib.  - 4+ METS  - RCRI (no h/o MI, CVA, DM/insulin, Cr>2): Class I risk. 3.9% 30 day risk of death, MI, cardiac arrest  - CEJA 0.2% risk for myocardial infarction or cardiac arrest, intraoperatively or up to 30 days post-op  - Patient is low to intermediate risk given age and LA enlargement - for intermediate risk surgery    # back pain  - pain regimen per primary team  - ensure bowel regimen    #CAD  - no history of MI or stents  - EKG nonischemic  - continue statin     # LA enlargement  Seen on echo in last admission.  - TTE showed left atrial dilation but normal EF and no significant valvular disease  - appears euvolemic  - continue with outpatient follow up with cardiology  - monitor for orthostatic symptoms    #HTN  - on home Valsartan 160 BID: please hold prior to OR, plan to resume after surgery.  - on diltiazem 120 mg at home - continue during perioperative period. monitor for hypotension.    #Asthma  - clear lungs, no acute exacerbation - albuterol inhaler PRN    #Erosive OA  - resume home dose of plaquenil (no need to hold this medication periop)    #CKD stage 3  - Cr stable  - avoid nephrotoxins  - monitor renal function and electrolytes    # anemia  # chronic iron deficiency anemia  - c/w home ferrous sulfate 325 every other day with bowel regimen   - trend CBC  - maintain active T&S, two large bore IVs  - transfuse for Hgb <7        84 year old female with a pmh of HTN, CAD, HLD, CKD stage 3, Erosive osteoarthritis, asthma and anxiety who is known to our orthopedic surgery with recent admission and spinal surgery in Nov. 2024, now returns to [facility] with severe back pain and is pending OR on Monday for removal of hardware, exploration of fusion and extension of fusion to C7 (C7 to T3).  Medicine is consulted for comanagement.     # pre-operative risk assessment  VSS.  Labs reviewed.  Prior to last month, normal METS>4, currently reports not able to be active due to back pain.   Able to exercise without dyspnea. Has chronic bouts of shortness of breath which are not precipitated by activity, likely precipitated by emotional stress. Assessed by cardiology on last admission in Nov. 2024.   No known adverse reactions to anesthesia in the past in self or first-degree relatives.   - TTE showed left atrial enlargement but normal EF and no significant valvular disease. No h/o Afib.  - <4 METS at the moment. not ambulating. (prior to the past weeks was 4+)  - RCRI (no h/o MI, CVA, DM/insulin, Cr>2): Class I risk. 3.9% 30 day risk of death, MI, cardiac arrest  - CEJA 0.2% risk for myocardial infarction or cardiac arrest, intraoperatively or up to 30 days post-op  - Patient is intermediate risk given age, declined functional status, and LA enlargement - for intermediate risk surgery    # back pain  - pain regimen per primary team  - ensure bowel regimen    #CAD  - no history of MI or stents  - EKG nonischemic  - continue statin     # LA enlargement  Seen on echo in last admission.  - TTE showed left atrial dilation but normal EF and no significant valvular disease  - appears euvolemic  - continue with outpatient follow up with cardiology  - monitor for orthostatic symptoms    #HTN  - on home Valsartan 160 BID: please hold prior to OR, plan to resume after surgery.  - on diltiazem 120 mg at home - continue during perioperative period. monitor for hypotension.    #Asthma  - clear lungs, no acute exacerbation - albuterol inhaler PRN    #Erosive OA  - resume home dose of plaquenil (no need to hold this medication periop)    #CKD stage 3  - Cr stable  - avoid nephrotoxins  - monitor renal function and electrolytes    # anemia  # chronic iron deficiency anemia  - c/w home ferrous sulfate 325 every other day with bowel regimen   - trend CBC  - maintain active T&S, two large bore IVs  - transfuse for Hgb <7     discussed with primary team

## 2024-12-21 NOTE — CONSULT NOTE ADULT - SUBJECTIVE AND OBJECTIVE BOX
JANES TYLER 84y Female    INTERVAL EVENTS:   no acute events during interval/overnight.    SUBJECTIVE:  Patient seen and examined at bedside.    Rest of 12 point review of systems is negative except as stated above.    OBJECTIVE:  Vital Signs Last 24 Hrs  T(C): 36.8 (21 Dec 2024 08:20), Max: 36.9 (21 Dec 2024 05:51)  T(F): 98.3 (21 Dec 2024 08:20), Max: 98.5 (21 Dec 2024 05:51)  HR: 102 (21 Dec 2024 08:20) (82 - 102)  BP: 171/79 (21 Dec 2024 08:20) (164/81 - 185/64)  BP(mean): --  RR: 17 (21 Dec 2024 08:20) (14 - 18)  SpO2: 96% (21 Dec 2024 08:20) (94% - 96%)    Parameters below as of 21 Dec 2024 08:20  Patient On (Oxygen Delivery Method): room air        PHYSICAL EXAM:  General: NAD. Speaking in full sentences. Resting in bed.   HEENT: NC/AT; PERRL, clear conjunctiva  Neck: supple  Respiratory: CTA b/l, no retractions or accessory muscle use. No increased work of breathing.   Cardiovascular: +S1/S2; RRR  Abdomen: soft, NT/ND; +BS x4  Extremities: 2+ peripheral pulses b/l; no LE edema  Skin: normal color and turgor; no rash; warm and well perfused  Neurological: AAOx3. No FND noted.  Psychiatry: Mood and Affect appropriate.    LABS:                        8.6    5.73  )-----------( 233      ( 21 Dec 2024 08:11 )             27.4     12-21    138  |  102  |  24[H]  ----------------------------<  109[H]  4.4   |  26  |  1.11    Ca    9.6      21 Dec 2024 08:11    TPro  6.5  /  Alb  3.6  /  TBili  0.3  /  DBili  x   /  AST  21  /  ALT  9[L]  /  AlkPhos  128[H]  12-20    LIVER FUNCTIONS - ( 20 Dec 2024 23:25 )  Alb: 3.6 g/dL / Pro: 6.5 g/dL / ALK PHOS: 128 U/L / ALT: 9 U/L / AST: 21 U/L / GGT: x             CAPILLARY BLOOD GLUCOSE      POCT Blood Glucose.: 118 mg/dL (21 Dec 2024 00:35)    Urinalysis Basic - ( 21 Dec 2024 08:11 )    Color: x / Appearance: x / SG: x / pH: x  Gluc: 109 mg/dL / Ketone: x  / Bili: x / Urobili: x   Blood: x / Protein: x / Nitrite: x   Leuk Esterase: x / RBC: x / WBC x   Sq Epi: x / Non Sq Epi: x / Bacteria: x        MICRODATA:      RADIOLOGY/OTHER STUDIES: reviewed.      MEDICATIONS:  acetaminophen     Tablet .. 1000 milliGRAM(s) Oral every 8 hours  albuterol    90 MICROgram(s) HFA Inhaler 2 Puff(s) Inhalation every 6 hours PRN  diltiazem    milliGRAM(s) Oral daily  DULoxetine 30 milliGRAM(s) Oral daily  HYDROmorphone  Injectable 0.5 milliGRAM(s) IV Push every 3 hours PRN  lactated ringers. 1000 milliLiter(s) IV Continuous <Continuous>  methocarbamol 500 milliGRAM(s) Oral every 8 hours  ondansetron   Disintegrating Tablet 4 milliGRAM(s) Oral every 6 hours  oxyCODONE    IR 5 milliGRAM(s) Oral every 4 hours PRN  oxyCODONE    IR 10 milliGRAM(s) Oral every 4 hours PRN  pregabalin 50 milliGRAM(s) Oral three times a day    venlafaxine (Unknown)  penicillin (Unknown)   JANES TYLER 84y Female    INTERVAL EVENTS:   no acute events during interval/overnight.    SUBJECTIVE:  Patient seen and examined at bedside.    Rest of 12 point review of systems is negative except as stated above.    OBJECTIVE:  Vital Signs Last 24 Hrs  T(C): 36.8 (21 Dec 2024 08:20), Max: 36.9 (21 Dec 2024 05:51)  T(F): 98.3 (21 Dec 2024 08:20), Max: 98.5 (21 Dec 2024 05:51)  HR: 102 (21 Dec 2024 08:20) (82 - 102)  BP: 171/79 (21 Dec 2024 08:20) (164/81 - 185/64)  BP(mean): --  RR: 17 (21 Dec 2024 08:20) (14 - 18)  SpO2: 96% (21 Dec 2024 08:20) (94% - 96%)    Parameters below as of 21 Dec 2024 08:20  Patient On (Oxygen Delivery Method): room air        PHYSICAL EXAM:      LABS:                        8.6    5.73  )-----------( 233      ( 21 Dec 2024 08:11 )             27.4     12-21    138  |  102  |  24[H]  ----------------------------<  109[H]  4.4   |  26  |  1.11    Ca    9.6      21 Dec 2024 08:11    TPro  6.5  /  Alb  3.6  /  TBili  0.3  /  DBili  x   /  AST  21  /  ALT  9[L]  /  AlkPhos  128[H]  12-20    LIVER FUNCTIONS - ( 20 Dec 2024 23:25 )  Alb: 3.6 g/dL / Pro: 6.5 g/dL / ALK PHOS: 128 U/L / ALT: 9 U/L / AST: 21 U/L / GGT: x             CAPILLARY BLOOD GLUCOSE      POCT Blood Glucose.: 118 mg/dL (21 Dec 2024 00:35)    Urinalysis Basic - ( 21 Dec 2024 08:11 )    Color: x / Appearance: x / SG: x / pH: x  Gluc: 109 mg/dL / Ketone: x  / Bili: x / Urobili: x   Blood: x / Protein: x / Nitrite: x   Leuk Esterase: x / RBC: x / WBC x   Sq Epi: x / Non Sq Epi: x / Bacteria: x        MICRODATA:      RADIOLOGY/OTHER STUDIES: reviewed.      MEDICATIONS:  acetaminophen     Tablet .. 1000 milliGRAM(s) Oral every 8 hours  albuterol    90 MICROgram(s) HFA Inhaler 2 Puff(s) Inhalation every 6 hours PRN  diltiazem    milliGRAM(s) Oral daily  DULoxetine 30 milliGRAM(s) Oral daily  HYDROmorphone  Injectable 0.5 milliGRAM(s) IV Push every 3 hours PRN  lactated ringers. 1000 milliLiter(s) IV Continuous <Continuous>  methocarbamol 500 milliGRAM(s) Oral every 8 hours  ondansetron   Disintegrating Tablet 4 milliGRAM(s) Oral every 6 hours  oxyCODONE    IR 5 milliGRAM(s) Oral every 4 hours PRN  oxyCODONE    IR 10 milliGRAM(s) Oral every 4 hours PRN  pregabalin 50 milliGRAM(s) Oral three times a day    venlafaxine (Unknown)  penicillin (Unknown)   JANES TYLER 84y Female    84 year old female with a pmh of HTN, CAD, HLD, CKD stage 3, Erosive osteoarthritis, asthma and anxiety who is known to our orthopedic surgery with recent admission and spinal surgery in Nov. 2024, now returns to [facility] with severe back pain and is pending OR on Monday for removal of hardware, exploration of fusion and extension of fusion to C7 (C7 to T3).  Medicine is consulted for comanagement.     SUBJECTIVE:  Patient seen and examined at bedside.  Reports she has had severe debilitating pain since her last hospitalization. Unable to walk, previously was able to walk with cane.  Now on pain medication which is making her sleepy but does help.     OBJECTIVE:  Vital Signs Last 24 Hrs  T(C): 36.8 (21 Dec 2024 08:20), Max: 36.9 (21 Dec 2024 05:51)  T(F): 98.3 (21 Dec 2024 08:20), Max: 98.5 (21 Dec 2024 05:51)  HR: 102 (21 Dec 2024 08:20) (82 - 102)  BP: 171/79 (21 Dec 2024 08:20) (164/81 - 185/64)  BP(mean): --  RR: 17 (21 Dec 2024 08:20) (14 - 18)  SpO2: 96% (21 Dec 2024 08:20) (94% - 96%)    Parameters below as of 21 Dec 2024 08:20  Patient On (Oxygen Delivery Method): room air      PHYSICAL EXAM:  General: NAD, sleeping, arousable. reports recently took pain medication (oxycodone) which makes her sleepy. able to speak in full sentences.   HEENT: NC/AT; PERRL, clear conjunctiva  Neck: supple  Respiratory: CTA b/l  Cardiovascular: +S1/S2; RRR  Abdomen: soft, NT/ND; +BS x4  Extremities: 2+ peripheral pulses b/l; no LE edema  Skin: normal color and turgor; no rash  Neurological: AAO x 3.  Psychiatry: appropriate mood/affect       LABS:                        8.6    5.73  )-----------( 233      ( 21 Dec 2024 08:11 )             27.4     12-21    138  |  102  |  24[H]  ----------------------------<  109[H]  4.4   |  26  |  1.11    Ca    9.6      21 Dec 2024 08:11    TPro  6.5  /  Alb  3.6  /  TBili  0.3  /  DBili  x   /  AST  21  /  ALT  9[L]  /  AlkPhos  128[H]  12-20    LIVER FUNCTIONS - ( 20 Dec 2024 23:25 )  Alb: 3.6 g/dL / Pro: 6.5 g/dL / ALK PHOS: 128 U/L / ALT: 9 U/L / AST: 21 U/L / GGT: x             CAPILLARY BLOOD GLUCOSE      POCT Blood Glucose.: 118 mg/dL (21 Dec 2024 00:35)    Urinalysis Basic - ( 21 Dec 2024 08:11 )    Color: x / Appearance: x / SG: x / pH: x  Gluc: 109 mg/dL / Ketone: x  / Bili: x / Urobili: x   Blood: x / Protein: x / Nitrite: x   Leuk Esterase: x / RBC: x / WBC x   Sq Epi: x / Non Sq Epi: x / Bacteria: x        MICRODATA:      RADIOLOGY/OTHER STUDIES: reviewed.      MEDICATIONS:  acetaminophen     Tablet .. 1000 milliGRAM(s) Oral every 8 hours  albuterol    90 MICROgram(s) HFA Inhaler 2 Puff(s) Inhalation every 6 hours PRN  diltiazem    milliGRAM(s) Oral daily  DULoxetine 30 milliGRAM(s) Oral daily  HYDROmorphone  Injectable 0.5 milliGRAM(s) IV Push every 3 hours PRN  lactated ringers. 1000 milliLiter(s) IV Continuous <Continuous>  methocarbamol 500 milliGRAM(s) Oral every 8 hours  ondansetron   Disintegrating Tablet 4 milliGRAM(s) Oral every 6 hours  oxyCODONE    IR 5 milliGRAM(s) Oral every 4 hours PRN  oxyCODONE    IR 10 milliGRAM(s) Oral every 4 hours PRN  pregabalin 50 milliGRAM(s) Oral three times a day    venlafaxine (Unknown)  penicillin (Unknown)

## 2024-12-21 NOTE — PATIENT PROFILE ADULT - FALL HARM RISK - RISK INTERVENTIONS

## 2024-12-22 LAB
ANION GAP SERPL CALC-SCNC: 9 MMOL/L — SIGNIFICANT CHANGE UP (ref 5–17)
BLD GP AB SCN SERPL QL: POSITIVE — SIGNIFICANT CHANGE UP
BUN SERPL-MCNC: 20 MG/DL — SIGNIFICANT CHANGE UP (ref 7–23)
CALCIUM SERPL-MCNC: 9.4 MG/DL — SIGNIFICANT CHANGE UP (ref 8.4–10.5)
CHLORIDE SERPL-SCNC: 102 MMOL/L — SIGNIFICANT CHANGE UP (ref 96–108)
CO2 SERPL-SCNC: 28 MMOL/L — SIGNIFICANT CHANGE UP (ref 22–31)
CREAT SERPL-MCNC: 1.08 MG/DL — SIGNIFICANT CHANGE UP (ref 0.5–1.3)
EGFR: 51 ML/MIN/1.73M2 — LOW
GLUCOSE SERPL-MCNC: 109 MG/DL — HIGH (ref 70–99)
HCT VFR BLD CALC: 28 % — LOW (ref 34.5–45)
HGB BLD-MCNC: 8.7 G/DL — LOW (ref 11.5–15.5)
MCHC RBC-ENTMCNC: 28.9 PG — SIGNIFICANT CHANGE UP (ref 27–34)
MCHC RBC-ENTMCNC: 31.1 G/DL — LOW (ref 32–36)
MCV RBC AUTO: 93 FL — SIGNIFICANT CHANGE UP (ref 80–100)
NRBC # BLD: 0 /100 WBCS — SIGNIFICANT CHANGE UP (ref 0–0)
PLATELET # BLD AUTO: 207 K/UL — SIGNIFICANT CHANGE UP (ref 150–400)
POTASSIUM SERPL-MCNC: 4.4 MMOL/L — SIGNIFICANT CHANGE UP (ref 3.5–5.3)
POTASSIUM SERPL-SCNC: 4.4 MMOL/L — SIGNIFICANT CHANGE UP (ref 3.5–5.3)
RBC # BLD: 3.01 M/UL — LOW (ref 3.8–5.2)
RBC # FLD: 13.5 % — SIGNIFICANT CHANGE UP (ref 10.3–14.5)
RH IG SCN BLD-IMP: POSITIVE — SIGNIFICANT CHANGE UP
SODIUM SERPL-SCNC: 139 MMOL/L — SIGNIFICANT CHANGE UP (ref 135–145)
WBC # BLD: 4.21 K/UL — SIGNIFICANT CHANGE UP (ref 3.8–10.5)
WBC # FLD AUTO: 4.21 K/UL — SIGNIFICANT CHANGE UP (ref 3.8–10.5)

## 2024-12-22 PROCEDURE — 86077 PHYS BLOOD BANK SERV XMATCH: CPT

## 2024-12-22 PROCEDURE — 99233 SBSQ HOSP IP/OBS HIGH 50: CPT

## 2024-12-22 RX ORDER — SCOPOLAMINE 1.5 MG/1
1 PATCH, EXTENDED RELEASE TRANSDERMAL ONCE
Refills: 0 | Status: COMPLETED | OUTPATIENT
Start: 2024-12-22 | End: 2024-12-22

## 2024-12-22 RX ORDER — ONDANSETRON 4 MG/1
4 TABLET ORAL EVERY 6 HOURS
Refills: 0 | Status: DISCONTINUED | OUTPATIENT
Start: 2024-12-22 | End: 2024-12-23

## 2024-12-22 RX ADMIN — Medication 10 MILLIGRAM(S): at 23:17

## 2024-12-22 RX ADMIN — Medication 500 MILLIGRAM(S): at 05:20

## 2024-12-22 RX ADMIN — ACETAMINOPHEN 1000 MILLIGRAM(S): 80 SOLUTION/ DROPS ORAL at 13:29

## 2024-12-22 RX ADMIN — ACETAMINOPHEN 1000 MILLIGRAM(S): 80 SOLUTION/ DROPS ORAL at 23:17

## 2024-12-22 RX ADMIN — Medication 5 MILLIGRAM(S): at 06:32

## 2024-12-22 RX ADMIN — Medication 500 MILLIGRAM(S): at 13:29

## 2024-12-22 RX ADMIN — DILTIAZEM HYDROCHLORIDE 120 MILLIGRAM(S): 300 CAPSULE, COATED, EXTENDED RELEASE ORAL at 06:51

## 2024-12-22 RX ADMIN — SCOPOLAMINE 1 PATCH: 1.5 PATCH, EXTENDED RELEASE TRANSDERMAL at 22:12

## 2024-12-22 RX ADMIN — Medication 5 MILLIGRAM(S): at 12:15

## 2024-12-22 RX ADMIN — CHLORHEXIDINE GLUCONATE 1 APPLICATION(S): 1.2 RINSE ORAL at 22:19

## 2024-12-22 RX ADMIN — ONDANSETRON 4 MILLIGRAM(S): 4 TABLET ORAL at 22:04

## 2024-12-22 RX ADMIN — Medication 5 MILLIGRAM(S): at 07:36

## 2024-12-22 RX ADMIN — ACETAMINOPHEN 1000 MILLIGRAM(S): 80 SOLUTION/ DROPS ORAL at 06:32

## 2024-12-22 RX ADMIN — Medication 500 MILLIGRAM(S): at 23:17

## 2024-12-22 RX ADMIN — Medication 5 MILLIGRAM(S): at 05:32

## 2024-12-22 RX ADMIN — Medication 10 MILLIGRAM(S): at 16:39

## 2024-12-22 RX ADMIN — DULOXETINE HYDROCHLORIDE 30 MILLIGRAM(S): 30 CAPSULE, DELAYED RELEASE ORAL at 13:29

## 2024-12-22 RX ADMIN — Medication 10 MILLIGRAM(S): at 08:00

## 2024-12-22 RX ADMIN — ONDANSETRON 4 MILLIGRAM(S): 4 TABLET ORAL at 15:12

## 2024-12-22 RX ADMIN — ACETAMINOPHEN 1000 MILLIGRAM(S): 80 SOLUTION/ DROPS ORAL at 14:29

## 2024-12-22 RX ADMIN — ONDANSETRON 4 MILLIGRAM(S): 4 TABLET ORAL at 05:20

## 2024-12-22 RX ADMIN — Medication 10 MILLIGRAM(S): at 17:39

## 2024-12-22 RX ADMIN — Medication 5 MILLIGRAM(S): at 13:15

## 2024-12-22 RX ADMIN — ACETAMINOPHEN 1000 MILLIGRAM(S): 80 SOLUTION/ DROPS ORAL at 05:20

## 2024-12-22 NOTE — PROGRESS NOTE ADULT - SUBJECTIVE AND OBJECTIVE BOX
JANES TYLER 84y Female    INTERVAL EVENTS:   BP elevated this morning. 179/78.    SUBJECTIVE:  Patient seen and examined at bedside.    Rest of 12 point review of systems is negative except as stated above.    OBJECTIVE:  Vital Signs Last 24 Hrs  T(C): 36.9 (22 Dec 2024 08:15), Max: 36.9 (21 Dec 2024 21:39)  T(F): 98.4 (22 Dec 2024 08:15), Max: 98.4 (21 Dec 2024 21:39)  HR: 76 (22 Dec 2024 08:15) (73 - 79)  BP: 179/78 (22 Dec 2024 08:15) (127/65 - 198/82)  BP(mean): --  RR: 17 (22 Dec 2024 08:15) (16 - 17)  SpO2: 98% (22 Dec 2024 08:15) (94% - 98%)    Parameters below as of 22 Dec 2024 08:15  Patient On (Oxygen Delivery Method): room air      PHYSICAL EXAM:      LABS:                        8.7    4.21  )-----------( 207      ( 22 Dec 2024 08:41 )             28.0     12-22    139  |  102  |  20  ----------------------------<  109[H]  4.4   |  28  |  1.08    Ca    9.4      22 Dec 2024 08:41    TPro  6.5  /  Alb  3.6  /  TBili  0.3  /  DBili  x   /  AST  21  /  ALT  9[L]  /  AlkPhos  128[H]  12-20    LIVER FUNCTIONS - ( 20 Dec 2024 23:25 )  Alb: 3.6 g/dL / Pro: 6.5 g/dL / ALK PHOS: 128 U/L / ALT: 9 U/L / AST: 21 U/L / GGT: x           Urinalysis Basic - ( 22 Dec 2024 08:41 )    Color: x / Appearance: x / SG: x / pH: x  Gluc: 109 mg/dL / Ketone: x  / Bili: x / Urobili: x   Blood: x / Protein: x / Nitrite: x   Leuk Esterase: x / RBC: x / WBC x   Sq Epi: x / Non Sq Epi: x / Bacteria: x    MEDICATIONS:  acetaminophen     Tablet .. 1000 milliGRAM(s) Oral every 8 hours  albuterol    90 MICROgram(s) HFA Inhaler 2 Puff(s) Inhalation every 6 hours PRN  aprepitant 40 milliGRAM(s) Oral once  chlorhexidine 2% Cloths 1 Application(s) Topical every 12 hours  diltiazem    milliGRAM(s) Oral daily  DULoxetine 30 milliGRAM(s) Oral daily  HYDROmorphone  Injectable 0.5 milliGRAM(s) IV Push every 3 hours PRN  lactated ringers. 1000 milliLiter(s) IV Continuous <Continuous>  methocarbamol 500 milliGRAM(s) Oral every 8 hours  ondansetron   Disintegrating Tablet 4 milliGRAM(s) Oral every 6 hours  oxyCODONE    IR 5 milliGRAM(s) Oral every 4 hours PRN  oxyCODONE    IR 10 milliGRAM(s) Oral every 4 hours PRN  povidone iodine 10% Nasal Swab 1 Application(s) Both Nostrils once  pregabalin 50 milliGRAM(s) Oral three times a day    Allergies:  venlafaxine (Unknown)  penicillin (Unknown)   JANES TYLER 84y Female    INTERVAL EVENTS:   BP elevated this morning. 179/78.    SUBJECTIVE:  Patient seen and examined at bedside.  Pain is a little better managed today, but reports it was severe yesterday. She can tolerate it better sitting up, but cannot lie down, which makes sleeping very difficult. Had trouble sleeping last night. Also feels that pain medication sometimes makes her feel sick and nauseous, however cannot tolerate pain without it. She is hoping the surgery tomorrow will help and is very anxious.   Rest of 12 point review of systems is negative except as stated above.    OBJECTIVE:  Vital Signs Last 24 Hrs  T(C): 36.9 (22 Dec 2024 08:15), Max: 36.9 (21 Dec 2024 21:39)  T(F): 98.4 (22 Dec 2024 08:15), Max: 98.4 (21 Dec 2024 21:39)  HR: 76 (22 Dec 2024 08:15) (73 - 79)  BP: 179/78 (22 Dec 2024 08:15) (127/65 - 198/82)  BP(mean): --  RR: 17 (22 Dec 2024 08:15) (16 - 17)  SpO2: 98% (22 Dec 2024 08:15) (94% - 98%)    Parameters below as of 22 Dec 2024 08:15  Patient On (Oxygen Delivery Method): room air      PHYSICAL EXAM:  General: NAD, sitting in bed. family at bedside. more alert and comfortable today compared to yesterday.  HEENT: NC/AT; PERRL, clear conjunctiva  Neck: supple  Respiratory: CTA b/l  Cardiovascular: +S1/S2; RRR  Abdomen: soft, NT/ND; +BS x4  Extremities: 2+ peripheral pulses b/l; no LE edema  Skin: normal color and turgor; no rash  Neurological: AAO x 3.  Psychiatry: appropriate mood/affect     LABS:                        8.7    4.21  )-----------( 207      ( 22 Dec 2024 08:41 )             28.0     12-22    139  |  102  |  20  ----------------------------<  109[H]  4.4   |  28  |  1.08    Ca    9.4      22 Dec 2024 08:41    TPro  6.5  /  Alb  3.6  /  TBili  0.3  /  DBili  x   /  AST  21  /  ALT  9[L]  /  AlkPhos  128[H]  12-20    LIVER FUNCTIONS - ( 20 Dec 2024 23:25 )  Alb: 3.6 g/dL / Pro: 6.5 g/dL / ALK PHOS: 128 U/L / ALT: 9 U/L / AST: 21 U/L / GGT: x           Urinalysis Basic - ( 22 Dec 2024 08:41 )    Color: x / Appearance: x / SG: x / pH: x  Gluc: 109 mg/dL / Ketone: x  / Bili: x / Urobili: x   Blood: x / Protein: x / Nitrite: x   Leuk Esterase: x / RBC: x / WBC x   Sq Epi: x / Non Sq Epi: x / Bacteria: x    MEDICATIONS:  acetaminophen     Tablet .. 1000 milliGRAM(s) Oral every 8 hours  albuterol    90 MICROgram(s) HFA Inhaler 2 Puff(s) Inhalation every 6 hours PRN  aprepitant 40 milliGRAM(s) Oral once  chlorhexidine 2% Cloths 1 Application(s) Topical every 12 hours  diltiazem    milliGRAM(s) Oral daily  DULoxetine 30 milliGRAM(s) Oral daily  HYDROmorphone  Injectable 0.5 milliGRAM(s) IV Push every 3 hours PRN  lactated ringers. 1000 milliLiter(s) IV Continuous <Continuous>  methocarbamol 500 milliGRAM(s) Oral every 8 hours  ondansetron   Disintegrating Tablet 4 milliGRAM(s) Oral every 6 hours  oxyCODONE    IR 5 milliGRAM(s) Oral every 4 hours PRN  oxyCODONE    IR 10 milliGRAM(s) Oral every 4 hours PRN  povidone iodine 10% Nasal Swab 1 Application(s) Both Nostrils once  pregabalin 50 milliGRAM(s) Oral three times a day    Allergies:  venlafaxine (Unknown)  penicillin (Unknown)

## 2024-12-22 NOTE — PROGRESS NOTE ADULT - ASSESSMENT
84 year old female with a pmh of HTN, CAD, HLD, CKD stage 3, Erosive osteoarthritis, asthma and anxiety who is known to our orthopedic surgery with recent admission and spinal surgery in Nov. 2024, now returns to [facility] with severe back pain and is pending OR on Monday for removal of hardware, exploration of fusion and extension of fusion to C7 (C7 to T3).  Medicine is consulted for comanagement.     # pre-operative risk assessment  VSS.  Labs reviewed.  Prior to last month, normal METS>4, currently reports not able to be active due to back pain.   Able to exercise without dyspnea. Has chronic bouts of shortness of breath which are not precipitated by activity, likely precipitated by emotional stress. Assessed by cardiology on last admission in Nov. 2024.   No known adverse reactions to anesthesia in the past in self or first-degree relatives.   - TTE showed left atrial enlargement but normal EF and no significant valvular disease. No h/o Afib.  - <4 METS at the moment. not ambulating. (prior to the past weeks was 4+)  - RCRI (no h/o MI, CVA, DM/insulin, Cr>2): Class I risk. 3.9% 30 day risk of death, MI, cardiac arrest  - CEJA 0.2% risk for myocardial infarction or cardiac arrest, intraoperatively or up to 30 days post-op  - Patient is intermediate risk given age, declined functional status, and LA enlargement - for intermediate risk surgery    # back pain  - pain regimen per primary team  - ensure bowel regimen    #CAD  - no history of MI or stents  - EKG nonischemic  - continue statin     # LA enlargement  Seen on echo in last admission.  - TTE showed left atrial dilation but normal EF and no significant valvular disease  - appears euvolemic  - continue with outpatient follow up with cardiology  - monitor for orthostatic symptoms    #HTN  - on home Valsartan 160 BID: please hold prior to OR, plan to resume after surgery.  - on diltiazem 120 mg at home - continue during perioperative period. monitor for hypotension.    #Asthma  - clear lungs, no acute exacerbation - albuterol inhaler PRN    #Erosive OA  - resume home dose of plaquenil (no need to hold this medication periop)    #CKD stage 3  - Cr stable  - avoid nephrotoxins  - monitor renal function and electrolytes    # anemia  # chronic iron deficiency anemia  - c/w home ferrous sulfate 325 every other day with bowel regimen   - trend CBC  - maintain active T&S, two large bore IVs  - transfuse for Hgb <7    84 year old female with a pmh of HTN, CAD, HLD, CKD stage 3, Erosive osteoarthritis, asthma and anxiety who is known to our orthopedic surgery with recent admission and spinal surgery in Nov. 2024, now returns to [facility] with severe back pain and is pending OR on Monday for removal of hardware, exploration of fusion and extension of fusion to C7 (C7 to T3).  Medicine is consulted for comanagement.     # pre-operative risk assessment  VSS.  Labs reviewed.  Prior to last month, normal METS>4, currently reports not able to be active due to back pain.   Able to exercise without dyspnea. Has chronic bouts of shortness of breath which are not precipitated by activity, likely precipitated by emotional stress. Assessed by cardiology on last admission in Nov. 2024.   No known adverse reactions to anesthesia in the past in self or first-degree relatives.   - TTE showed left atrial enlargement but normal EF and no significant valvular disease. No h/o Afib.  - <4 METS at the moment. not ambulating. (prior to the past weeks was 4+)  - RCRI (no h/o MI, CVA, DM/insulin, Cr>2): Class I risk. 3.9% 30 day risk of death, MI, cardiac arrest  - CEJA 0.2% risk for myocardial infarction or cardiac arrest, intraoperatively or up to 30 days post-op  - Patient is intermediate risk given age, declined functional status, and LA enlargement - for intermediate risk surgery    # back pain  says slightly improved today from when she first presented, although the pain medications are making her sleepy and cause difficulty at night.   - pain regimen per primary team  - ensure bowel regimen  - can offer lidocaine patch   - can offer melatonin at night    #CAD  - no history of MI or stents  - EKG nonischemic  - continue statin     # LA enlargement  Seen on echo in last admission.  - TTE showed left atrial dilation but normal EF and no significant valvular disease  - appears euvolemic  - continue with outpatient follow up with cardiology  - monitor for orthostatic symptoms    #HTN  - on home Valsartan 160 BID: please hold prior to OR, plan to resume after surgery.  - on diltiazem 120 mg at home - continue during perioperative period. monitor for hypotension.    #Asthma  - clear lungs, no acute exacerbation - albuterol inhaler PRN    #Erosive OA  - resume home dose of plaquenil (no need to hold this medication periop)    #CKD stage 3  - Cr stable  - avoid nephrotoxins  - monitor renal function and electrolytes    # anemia  # chronic iron deficiency anemia  - c/w home ferrous sulfate 325 every other day with bowel regimen   - trend CBC  - maintain active T&S, two large bore IVs  - transfuse for Hgb <7

## 2024-12-22 NOTE — PROGRESS NOTE ADULT - SUBJECTIVE AND OBJECTIVE BOX
Ortho Note    Pt comfortable without complaints, pain controlled  Denies CP, SOB, N/V, numbness/tingling     Vital Signs Last 24 Hrs  T(C): 36.9 (12-22-24 @ 08:15), Max: 36.9 (12-22-24 @ 05:06)  T(F): 98.4 (12-22-24 @ 08:15), Max: 98.4 (12-22-24 @ 05:06)  HR: 76 (12-22-24 @ 08:15) (76 - 79)  BP: 179/78 (12-22-24 @ 08:15) (161/78 - 198/82)  BP(mean): --  RR: 17 (12-22-24 @ 08:15) (16 - 17)  SpO2: 98% (12-22-24 @ 08:15) (98% - 98%)  I&O's Summary    21 Dec 2024 07:01  -  22 Dec 2024 07:00  --------------------------------------------------------  IN: 480 mL / OUT: 500 mL / NET: -20 mL    AFVSSS  RLE:  SILT L2-S1, symmetric  Motor 5/5 L2-S1, symmetric  Pulses 2+    LLE:  SILT L2-S1, symmetric  Motor 5/5 L2-S1, symmetric  Pulses 2+                          8.7    4.21  )-----------( 207      ( 22 Dec 2024 08:41 )             28.0     12-22    139  |  102  |  20  ----------------------------<  109[H]  4.4   |  28  |  1.08    Ca    9.4      22 Dec 2024 08:41    TPro  6.5  /  Alb  3.6  /  TBili  0.3  /  DBili  x   /  AST  21  /  ALT  9[L]  /  AlkPhos  128[H]  12-20      84yF s/p OR T4-9 extension of PSF, laminectomy T8-9, exploration of fusion, WINIFRED  11-11 with residual symptoms pending OR Monday for WINIFRED, Exploration of fusion, extension of fusion to C7 (C7-T3)    - Admit to?Ortho  - Med clearance   - Anesthesia clearance   -?Pre-op labs and imaging?-?CXR, EKG, COVID, CBC, BMP, T&S/ABO, PT/PTT/INR  - AC held  -?Analgesia PRN  -?B/l SCDs?   - Dispo: OR    Ortho Pager [phone]

## 2024-12-23 ENCOUNTER — APPOINTMENT (OUTPATIENT)
Dept: ORTHOPEDIC SURGERY | Facility: HOSPITAL | Age: 84
End: 2024-12-23

## 2024-12-23 DIAGNOSIS — M48.00 SPINAL STENOSIS, SITE UNSPECIFIED: ICD-10-CM

## 2024-12-23 DIAGNOSIS — M40.209 UNSPECIFIED KYPHOSIS, SITE UNSPECIFIED: ICD-10-CM

## 2024-12-23 LAB
ANION GAP SERPL CALC-SCNC: 7 MMOL/L — SIGNIFICANT CHANGE UP (ref 5–17)
BUN SERPL-MCNC: 24 MG/DL — HIGH (ref 7–23)
CALCIUM SERPL-MCNC: 9 MG/DL — SIGNIFICANT CHANGE UP (ref 8.4–10.5)
CHLORIDE SERPL-SCNC: 102 MMOL/L — SIGNIFICANT CHANGE UP (ref 96–108)
CO2 SERPL-SCNC: 32 MMOL/L — HIGH (ref 22–31)
CREAT SERPL-MCNC: 1.31 MG/DL — HIGH (ref 0.5–1.3)
EGFR: 41 ML/MIN/1.73M2 — LOW
GLUCOSE SERPL-MCNC: 107 MG/DL — HIGH (ref 70–99)
HCT VFR BLD CALC: 29.6 % — LOW (ref 34.5–45)
HGB BLD-MCNC: 9.3 G/DL — LOW (ref 11.5–15.5)
MCHC RBC-ENTMCNC: 28.6 PG — SIGNIFICANT CHANGE UP (ref 27–34)
MCHC RBC-ENTMCNC: 31.4 G/DL — LOW (ref 32–36)
MCV RBC AUTO: 91.1 FL — SIGNIFICANT CHANGE UP (ref 80–100)
NRBC # BLD: 0 /100 WBCS — SIGNIFICANT CHANGE UP (ref 0–0)
PLATELET # BLD AUTO: 230 K/UL — SIGNIFICANT CHANGE UP (ref 150–400)
POTASSIUM SERPL-MCNC: 4.6 MMOL/L — SIGNIFICANT CHANGE UP (ref 3.5–5.3)
POTASSIUM SERPL-SCNC: 4.6 MMOL/L — SIGNIFICANT CHANGE UP (ref 3.5–5.3)
RBC # BLD: 3.25 M/UL — LOW (ref 3.8–5.2)
RBC # FLD: 13.6 % — SIGNIFICANT CHANGE UP (ref 10.3–14.5)
SODIUM SERPL-SCNC: 141 MMOL/L — SIGNIFICANT CHANGE UP (ref 135–145)
WBC # BLD: 7.93 K/UL — SIGNIFICANT CHANGE UP (ref 3.8–10.5)
WBC # FLD AUTO: 7.93 K/UL — SIGNIFICANT CHANGE UP (ref 3.8–10.5)

## 2024-12-23 PROCEDURE — 71045 X-RAY EXAM CHEST 1 VIEW: CPT | Mod: 26,76

## 2024-12-23 PROCEDURE — 22610 ARTHRD PST TQ 1NTRSPC THRC: CPT | Mod: 78

## 2024-12-23 PROCEDURE — 74018 RADEX ABDOMEN 1 VIEW: CPT | Mod: 26

## 2024-12-23 PROCEDURE — 22842 INSERT SPINE FIXATION DEVICE: CPT | Mod: 78

## 2024-12-23 PROCEDURE — 63048 LAM FACETEC &FORAMOT EA ADDL: CPT | Mod: 78

## 2024-12-23 PROCEDURE — 22843 INSERT SPINE FIXATION DEVICE: CPT | Mod: 78

## 2024-12-23 PROCEDURE — 63045 LAM FACETEC & FORAMOT CRV: CPT | Mod: 78

## 2024-12-23 PROCEDURE — 22830 EXPLORATION OF SPINAL FUSION: CPT | Mod: 78,59

## 2024-12-23 PROCEDURE — 22614 ARTHRD PST TQ 1NTRSPC EA ADD: CPT | Mod: 78

## 2024-12-23 DEVICE — IMPLANTABLE DEVICE: Type: IMPLANTABLE DEVICE | Status: FUNCTIONAL

## 2024-12-23 DEVICE — SURGIFLO HEMOSTATIC MATRIX KIT: Type: IMPLANTABLE DEVICE | Status: FUNCTIONAL

## 2024-12-23 DEVICE — SURGIFOAM PAD 8CM X 12.5CM X 10MM (100): Type: IMPLANTABLE DEVICE | Status: FUNCTIONAL

## 2024-12-23 DEVICE — CLOSURE TOP STD 5.5-6.0: Type: IMPLANTABLE DEVICE | Status: FUNCTIONAL

## 2024-12-23 DEVICE — GRAFT BONE INFUSE KIT LG: Type: IMPLANTABLE DEVICE | Status: FUNCTIONAL

## 2024-12-23 DEVICE — CLAMP RAIL NILE ALT FIX 5.5MM: Type: IMPLANTABLE DEVICE | Status: FUNCTIONAL

## 2024-12-23 RX ORDER — SODIUM CHLORIDE 9 MG/ML
1000 INJECTION, SOLUTION INTRAVENOUS
Refills: 0 | Status: DISCONTINUED | OUTPATIENT
Start: 2024-12-23 | End: 2024-12-27

## 2024-12-23 RX ORDER — METHOCARBAMOL 500 MG
500 TABLET ORAL EVERY 8 HOURS
Refills: 0 | Status: DISCONTINUED | OUTPATIENT
Start: 2024-12-23 | End: 2025-01-04

## 2024-12-23 RX ORDER — HYDROMORPHONE HCL 4 MG
0.5 TABLET ORAL
Refills: 0 | Status: DISCONTINUED | OUTPATIENT
Start: 2024-12-23 | End: 2024-12-23

## 2024-12-23 RX ORDER — ONDANSETRON 4 MG/1
4 TABLET ORAL EVERY 6 HOURS
Refills: 0 | Status: DISCONTINUED | OUTPATIENT
Start: 2024-12-23 | End: 2025-01-04

## 2024-12-23 RX ORDER — HYDROMORPHONE HCL 4 MG
0.5 TABLET ORAL EVERY 4 HOURS
Refills: 0 | Status: DISCONTINUED | OUTPATIENT
Start: 2024-12-23 | End: 2024-12-23

## 2024-12-23 RX ORDER — HYDROXYCHLOROQUINE SULFATE 200 MG/1
200 TABLET ORAL
Refills: 0 | Status: DISCONTINUED | OUTPATIENT
Start: 2024-12-23 | End: 2025-01-04

## 2024-12-23 RX ORDER — DULOXETINE HYDROCHLORIDE 30 MG/1
30 CAPSULE, DELAYED RELEASE ORAL DAILY
Refills: 0 | Status: DISCONTINUED | OUTPATIENT
Start: 2024-12-23 | End: 2025-01-04

## 2024-12-23 RX ORDER — ATORVASTATIN CALCIUM 40 MG/1
80 TABLET, FILM COATED ORAL AT BEDTIME
Refills: 0 | Status: DISCONTINUED | OUTPATIENT
Start: 2024-12-23 | End: 2025-01-04

## 2024-12-23 RX ORDER — OXYCODONE HCL 15 MG
10 TABLET ORAL EVERY 4 HOURS
Refills: 0 | Status: DISCONTINUED | OUTPATIENT
Start: 2024-12-23 | End: 2024-12-24

## 2024-12-23 RX ORDER — METOCLOPRAMIDE 10 MG/1
10 TABLET ORAL ONCE
Refills: 0 | Status: COMPLETED | OUTPATIENT
Start: 2024-12-23 | End: 2024-12-23

## 2024-12-23 RX ORDER — PANTOPRAZOLE 40 MG/1
40 TABLET, DELAYED RELEASE ORAL
Refills: 0 | Status: DISCONTINUED | OUTPATIENT
Start: 2024-12-23 | End: 2025-01-04

## 2024-12-23 RX ORDER — ATORVASTATIN CALCIUM 40 MG/1
80 TABLET, FILM COATED ORAL AT BEDTIME
Refills: 0 | Status: DISCONTINUED | OUTPATIENT
Start: 2024-12-23 | End: 2024-12-23

## 2024-12-23 RX ORDER — ACETAMINOPHEN 80 MG/.8ML
1000 SOLUTION/ DROPS ORAL EVERY 8 HOURS
Refills: 0 | Status: DISCONTINUED | OUTPATIENT
Start: 2024-12-23 | End: 2024-12-24

## 2024-12-23 RX ORDER — POLYETHYLENE GLYCOL 3350 17 G/DOSE
17 POWDER (GRAM) ORAL AT BEDTIME
Refills: 0 | Status: DISCONTINUED | OUTPATIENT
Start: 2024-12-23 | End: 2024-12-31

## 2024-12-23 RX ORDER — CEFAZOLIN SODIUM 1 G
2000 VIAL (EA) INJECTION EVERY 8 HOURS
Refills: 0 | Status: COMPLETED | OUTPATIENT
Start: 2024-12-23 | End: 2024-12-24

## 2024-12-23 RX ORDER — ONDANSETRON 4 MG/1
4 TABLET ORAL ONCE
Refills: 0 | Status: COMPLETED | OUTPATIENT
Start: 2024-12-23 | End: 2024-12-23

## 2024-12-23 RX ORDER — HYDROMORPHONE HCL 4 MG
0.5 TABLET ORAL ONCE
Refills: 0 | Status: DISCONTINUED | OUTPATIENT
Start: 2024-12-23 | End: 2024-12-23

## 2024-12-23 RX ORDER — HYDROXYCHLOROQUINE SULFATE 200 MG/1
200 TABLET ORAL
Refills: 0 | Status: DISCONTINUED | OUTPATIENT
Start: 2024-12-23 | End: 2024-12-23

## 2024-12-23 RX ORDER — OXYCODONE HCL 15 MG
5 TABLET ORAL EVERY 4 HOURS
Refills: 0 | Status: DISCONTINUED | OUTPATIENT
Start: 2024-12-23 | End: 2024-12-24

## 2024-12-23 RX ORDER — ALBUTEROL SULFATE 90 UG/1
2 INHALANT RESPIRATORY (INHALATION) EVERY 6 HOURS
Refills: 0 | Status: DISCONTINUED | OUTPATIENT
Start: 2024-12-23 | End: 2025-01-04

## 2024-12-23 RX ORDER — SENNOSIDES 8.6 MG/1
2 TABLET, FILM COATED ORAL AT BEDTIME
Refills: 0 | Status: DISCONTINUED | OUTPATIENT
Start: 2024-12-23 | End: 2025-01-04

## 2024-12-23 RX ORDER — DILTIAZEM HYDROCHLORIDE 300 MG/1
120 CAPSULE, COATED, EXTENDED RELEASE ORAL DAILY
Refills: 0 | Status: DISCONTINUED | OUTPATIENT
Start: 2024-12-23 | End: 2025-01-04

## 2024-12-23 RX ORDER — KETOROLAC TROMETHAMINE 30 MG/ML
30 INJECTION INTRAMUSCULAR; INTRAVENOUS ONCE
Refills: 0 | Status: DISCONTINUED | OUTPATIENT
Start: 2024-12-23 | End: 2024-12-23

## 2024-12-23 RX ORDER — ACETAMINOPHEN 80 MG/.8ML
1000 SOLUTION/ DROPS ORAL ONCE
Refills: 0 | Status: COMPLETED | OUTPATIENT
Start: 2024-12-23 | End: 2024-12-23

## 2024-12-23 RX ADMIN — ACETAMINOPHEN 1000 MILLIGRAM(S): 80 SOLUTION/ DROPS ORAL at 10:14

## 2024-12-23 RX ADMIN — CHLORHEXIDINE GLUCONATE 1 APPLICATION(S): 1.2 RINSE ORAL at 05:43

## 2024-12-23 RX ADMIN — ACETAMINOPHEN 1000 MILLIGRAM(S): 80 SOLUTION/ DROPS ORAL at 11:14

## 2024-12-23 RX ADMIN — KETOROLAC TROMETHAMINE 30 MILLIGRAM(S): 30 INJECTION INTRAMUSCULAR; INTRAVENOUS at 01:30

## 2024-12-23 RX ADMIN — Medication 100 MILLIGRAM(S): at 19:58

## 2024-12-23 RX ADMIN — ACETAMINOPHEN 1000 MILLIGRAM(S): 80 SOLUTION/ DROPS ORAL at 00:17

## 2024-12-23 RX ADMIN — DILTIAZEM HYDROCHLORIDE 120 MILLIGRAM(S): 300 CAPSULE, COATED, EXTENDED RELEASE ORAL at 05:42

## 2024-12-23 RX ADMIN — Medication 0.5 MILLIGRAM(S): at 17:44

## 2024-12-23 RX ADMIN — APREPITANT 40 MILLIGRAM(S): 40 CAPSULE ORAL at 10:14

## 2024-12-23 RX ADMIN — SODIUM CHLORIDE 75 MILLILITER(S): 9 INJECTION, SOLUTION INTRAVENOUS at 00:31

## 2024-12-23 RX ADMIN — Medication 10 MILLIGRAM(S): at 00:17

## 2024-12-23 RX ADMIN — Medication 0.5 MILLIGRAM(S): at 17:59

## 2024-12-23 RX ADMIN — ONDANSETRON 4 MILLIGRAM(S): 4 TABLET ORAL at 05:42

## 2024-12-23 RX ADMIN — POVIDONE IODINE USP, 10% W/W 1 APPLICATION(S): 10 SWAB TOPICAL at 10:21

## 2024-12-23 RX ADMIN — SCOPOLAMINE 1 PATCH: 1.5 PATCH, EXTENDED RELEASE TRANSDERMAL at 06:44

## 2024-12-23 RX ADMIN — METOCLOPRAMIDE 10 MILLIGRAM(S): 10 TABLET ORAL at 01:08

## 2024-12-23 RX ADMIN — KETOROLAC TROMETHAMINE 30 MILLIGRAM(S): 30 INJECTION INTRAMUSCULAR; INTRAVENOUS at 01:07

## 2024-12-23 NOTE — PROGRESS NOTE ADULT - SUBJECTIVE AND OBJECTIVE BOX
Orthopaedics Post Op Check    Procedure: WINIFRED, Exploration of Fusion, Extension of Fusion to T1 with Tethers to T7  Surgeon: Dr. Salguero     Pt comfortable, without complaints  Denies CP, SOB, N/V, numbness/tingling   NG Tube placed in OR, gen surg recs pending     Vital Signs Last 24 Hrs  T(C): 36.6 (23 Dec 2024 09:03), Max: 37.2 (22 Dec 2024 20:34)  T(F): 97.9 (23 Dec 2024 08:20), Max: 99 (22 Dec 2024 20:34)  HR: 102 (23 Dec 2024 09:03) (100 - 117)  BP: 163/79 (23 Dec 2024 09:03) (163/79 - 198/89)  BP(mean): --  RR: 17 (23 Dec 2024 09:03) (16 - 18)  SpO2: 97% (23 Dec 2024 09:03) (94% - 97%)    Parameters below as of 23 Dec 2024 08:20  Patient On (Oxygen Delivery Method): room air      AVSS, NAD    Dressing C/D/I; HV x 1   General: Pt Alert and oriented     Pulses: DP pulses palpable bilaterally   Sensation: SLT in tact to distal bilateral lower extremities   Motor: EHL/FHL/TA/GS 5/5 motor strength bilateral lower extremities                           9.3    7.93  )-----------( 230      ( 23 Dec 2024 05:30 )             29.6   12-23    141  |  102  |  24[H]  ----------------------------<  107[H]  4.6   |  32[H]  |  1.31[H]    Ca    9.0      23 Dec 2024 05:30      Post op XR: fluoroscopy utilized intra op     A/P: 84yFemale POD#0 s/p WINIFRED, Exploration of Fusion, Extension of Fusion to T1 with Tethers to T7  - Stable  - Pain Control  - DVT ppx: SCDs  - Post op abx: Ancef  - PT, WBS: WBAT  - F/U AM Labs

## 2024-12-23 NOTE — PACU DISCHARGE NOTE - COMMENTS
VSS.  SBP 160s at bedside, baseline 170s.  to tele.  General surgery to evaluate 2/2 large OGT output, which can be done on tele floor.

## 2024-12-23 NOTE — PRE-ANESTHESIA EVALUATION ADULT - NSANTHADDINFOFT_GEN_ALL_CORE
I explained the risks and benefits of the proposed anesthetic plan, patient understands these risks and benefits. All of the patient's questions regarding anesthetic plan were answered. I explained the risks and benefits of the proposed anesthetic plan, patient understands these risks and benefits. All of the patient's questions regarding anesthetic plan were answered.    Per pt, she had vomiting yesterday (per pt due to soup she ate in hospital) and this morning continued to have acid reflux. Had episode of small volume emesis at 7am, none since then. KUB don this AM reviewed, showed no signs of bowel obstruction or pneumoperitoneum. Denied nausea/vomiting/fever in pre-op. Plan for RSI and OG tube, explained to patient.

## 2024-12-23 NOTE — BRIEF OPERATIVE NOTE - NSICDXBRIEFPROCEDURE_GEN_ALL_CORE_FT
PROCEDURES:  Fusion, posterior spinal column, lumbar, 3 levels, posterior approach 23-Dec-2024 14:54:32 Extension of prior fusion to T1 Jacob Lopez

## 2024-12-23 NOTE — ANESTHESIA FOLLOW-UP NOTE - NSEVALATIONFT_GEN_ALL_CORE
General Surgery to evaluate pt postop
Detail Level: Simple
Detail Level: Zone
Detail Level: Generalized

## 2024-12-23 NOTE — BRIEF OPERATIVE NOTE - NSICDXBRIEFPREOP_GEN_ALL_CORE_FT
PRE-OP DIAGNOSIS:  Kyphosis 23-Dec-2024 14:53:58  Jacob Lopez  Spinal stenosis 23-Dec-2024 14:53:44  Jacob Lopez

## 2024-12-23 NOTE — CONSULT NOTE ADULT - ASSESSMENT
84y Female pending OR 12/23 for WINIFRED, Exploration of fusion, extension of fusion to C7 (C7-T3). General surgery consulted after intra-op placement of OG tube after induction led to 1.3L brown gastric contents appearing fluid, the procedure had already started at the time of consult. Prior to procedure the patient had nausea/retching but no vomiting the night prior and was thought to be due possible poor food tolerance, x-ray obtained showed fecal impaction but obstructive findings on the abdominal film. Pts last meal was last night and is not on any GI motility slowing medications.  No acute surgical needs or intervention, the OG tube is possibly in the too distal therefore causing the output given that the x-ray from night before was generall unremarkable. Please place/keep NG tube in place post-procedure     PLAN  NPO   NG tube must stay in post-op - please confirm placement with x-ray   NG to LIS   IVF   Please disimpact patient given the x-ray findings of rectal stool impaction  Team 4c will continue to follow

## 2024-12-23 NOTE — PROGRESS NOTE ADULT - SUBJECTIVE AND OBJECTIVE BOX
Ortho Note    Pt comfortable without complaints, pain controlled  Denies CP, SOB, N/V, numbness/tingling     Vital Signs Last 24 Hrs  T(C): 36.6 (12-23-24 @ 08:20), Max: 36.8 (12-23-24 @ 04:50)  T(F): 97.9 (12-23-24 @ 08:20), Max: 98.2 (12-23-24 @ 04:50)  HR: 102 (12-23-24 @ 08:20) (102 - 117)  BP: 163/79 (12-23-24 @ 08:20) (163/79 - 181/87)  BP(mean): --  RR: 17 (12-23-24 @ 08:20) (16 - 17)  SpO2: 97% (12-23-24 @ 08:20) (94% - 97%)  I&O's Summary    22 Dec 2024 07:01  -  23 Dec 2024 07:00  --------------------------------------------------------  IN: 600 mL / OUT: 1500 mL / NET: -900 mL        AFVSSS  RLE:  SILT L2-S1, symmetric  Motor 5/5 L2-S1, symmetric  Pulses 2+    LLE:  SILT L2-S1, symmetric  Motor 5/5 L2-S1, symmetric  Pulses 2+                          8.7    4.21  )-----------( 207      ( 22 Dec 2024 08:41 )             28.0     12-22    139  |  102  |  20  ----------------------------<  109[H]  4.4   |  28  |  1.08    Ca    9.4      22 Dec 2024 08:41      84yF s/p OR T4-9 extension of PSF, laminectomy T8-9, exploration of fusion, WINIFRED  11-11 with residual symptoms pending OR Monday for WINIFRED, Exploration of fusion, extension of fusion to C7 (C7-T3)  - OR today  -?Pre-op labs and imaging?-?CXR, EKG, COVID, CBC, BMP, T&S/ABO, PT/PTT/INR reviewed  - AC held  -?Analgesia PRN  -?B/l SCDs?   - Dispo: OR    Ortho Pager [phone]

## 2024-12-23 NOTE — CONSULT NOTE ADULT - SUBJECTIVE AND OBJECTIVE BOX
84y Female pending OR 12/23 for WINIFRED, Exploration of fusion, extension of fusion to C7 (C7-T3). General surgery consulted after intra-op placement of OG tube after induction led to 1.3L brown gastric contents appearing fluid, the procedure had already started at the time of consult. Prior to procedure the patient had nausea/retching but no vomiting the night prior and was thought to be due possible poor food tolerance, x-ray obtained showed fecal impaction but obstructive findings on the abdominal film. Pts last meal was last night and is not on any GI motility slowing medications.      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; H/O bilateral hip replacements; H/O breast surgery LEFT BIOPSY; H/O cervical spine surgery; H/O knee surgery BILATERAL REPLACEMENTS; H/O parathyroidectomy; H/O shoulder surgery RIGHT ROTATOR CUFF; History of back surgery; History of back surgery LOWER; History of cataract surgery BILAT.Medications:   Allergies: NKDA       T(C): 36.6 (12-23-24 @ 09:03), Max: 37.2 (12-22-24 @ 20:34)  HR: 102 (12-23-24 @ 09:03) (91 - 117)  BP: 163/79 (12-23-24 @ 09:03) (163/79 - 198/89)  RR: 17 (12-23-24 @ 09:03) (16 - 18)  SpO2: 97% (12-23-24 @ 09:03) (94% - 99%)      12-22-24 @ 07:01  -  12-23-24 @ 07:00  --------------------------------------------------------  IN: 600 mL / OUT: 1500 mL / NET: -900 mL        PHYSICAL EXAM  Unable to assess as patient intubated, sedated and proned  NG output: 1.3L and brown gastric contents appearing       LABS:                        9.3    7.93  )-----------( 230      ( 23 Dec 2024 05:30 )             29.6     12-23    141  |  102  |  24[H]  ----------------------------<  107[H]  4.6   |  32[H]  |  1.31[H]    Ca    9.0      23 Dec 2024 05:30      IMAGING       INTERPRETATION:  Clinical history/reason for exam: Vomiting.    KUB.    No comparison.    Findings/  impression: Colonic and rectal feces/impaction with no abnormal bowel   dilatation or pneumoperitoneum. Chilaiditi syndrome. Calcified fibroid   uterus. Levoscoliosis. Thoracic, lumbar, bilateral sacral and bilateral   iliac hardware. Bilateral hip total arthroplasties.    --- End of Report ---           84y Female pending OR 12/23 for WINIFRED, Exploration of fusion, extension of fusion to C7 (C7-T3). General surgery consulted after intra-op placement of OG tube after induction led to 1.3L brown gastric contents appearing fluid, the procedure had already started at the time of consult. Prior to procedure the patient had nausea/retching but no vomiting the night prior and was thought to be due possible poor food tolerance, x-ray obtained today showed fecal impaction but no obstructive findings on the abdominal film. Pts last meal was last night and is not on any GI motility slowing medications.      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; H/O bilateral hip replacements; H/O breast surgery LEFT BIOPSY; H/O cervical spine surgery; H/O knee surgery BILATERAL REPLACEMENTS; H/O parathyroidectomy; H/O shoulder surgery RIGHT ROTATOR CUFF; History of back surgery; History of back surgery LOWER; History of cataract surgery BILAT.Medications:   Allergies: NKDA       T(C): 36.6 (12-23-24 @ 09:03), Max: 37.2 (12-22-24 @ 20:34)  HR: 102 (12-23-24 @ 09:03) (91 - 117)  BP: 163/79 (12-23-24 @ 09:03) (163/79 - 198/89)  RR: 17 (12-23-24 @ 09:03) (16 - 18)  SpO2: 97% (12-23-24 @ 09:03) (94% - 99%)      12-22-24 @ 07:01  -  12-23-24 @ 07:00  --------------------------------------------------------  IN: 600 mL / OUT: 1500 mL / NET: -900 mL        PHYSICAL EXAM  Unable to assess as patient intubated, sedated and proned  NG output: 1.3L and brown gastric contents appearing       LABS:                        9.3    7.93  )-----------( 230      ( 23 Dec 2024 05:30 )             29.6     12-23    141  |  102  |  24[H]  ----------------------------<  107[H]  4.6   |  32[H]  |  1.31[H]    Ca    9.0      23 Dec 2024 05:30      IMAGING       INTERPRETATION:  Clinical history/reason for exam: Vomiting.    KUB.    No comparison.    Findings/  impression: Colonic and rectal feces/impaction with no abnormal bowel   dilatation or pneumoperitoneum. Chilaiditi syndrome. Calcified fibroid   uterus. Levoscoliosis. Thoracic, lumbar, bilateral sacral and bilateral   iliac hardware. Bilateral hip total arthroplasties.    --- End of Report ---

## 2024-12-23 NOTE — BRIEF OPERATIVE NOTE - NSICDXBRIEFPOSTOP_GEN_ALL_CORE_FT
POST-OP DIAGNOSIS:  Kyphosis 23-Dec-2024 14:54:05  Jacob Lopez  Spinal stenosis 23-Dec-2024 14:53:52  Jacob Lopez

## 2024-12-24 LAB
ANION GAP SERPL CALC-SCNC: 9 MMOL/L — SIGNIFICANT CHANGE UP (ref 5–17)
BASOPHILS # BLD AUTO: 0.01 K/UL — SIGNIFICANT CHANGE UP (ref 0–0.2)
BASOPHILS NFR BLD AUTO: 0.1 % — SIGNIFICANT CHANGE UP (ref 0–2)
BUN SERPL-MCNC: 29 MG/DL — HIGH (ref 7–23)
CALCIUM SERPL-MCNC: 8 MG/DL — LOW (ref 8.4–10.5)
CHLORIDE SERPL-SCNC: 99 MMOL/L — SIGNIFICANT CHANGE UP (ref 96–108)
CO2 SERPL-SCNC: 29 MMOL/L — SIGNIFICANT CHANGE UP (ref 22–31)
CREAT SERPL-MCNC: 1.18 MG/DL — SIGNIFICANT CHANGE UP (ref 0.5–1.3)
EGFR: 46 ML/MIN/1.73M2 — LOW
EOSINOPHIL # BLD AUTO: 0 K/UL — SIGNIFICANT CHANGE UP (ref 0–0.5)
EOSINOPHIL NFR BLD AUTO: 0 % — SIGNIFICANT CHANGE UP (ref 0–6)
GLUCOSE SERPL-MCNC: 106 MG/DL — HIGH (ref 70–99)
HCT VFR BLD CALC: 19 % — CRITICAL LOW (ref 34.5–45)
HCT VFR BLD CALC: 20.1 % — CRITICAL LOW (ref 34.5–45)
HCT VFR BLD CALC: 22.5 % — LOW (ref 34.5–45)
HGB BLD-MCNC: 6.1 G/DL — CRITICAL LOW (ref 11.5–15.5)
HGB BLD-MCNC: 6.3 G/DL — CRITICAL LOW (ref 11.5–15.5)
HGB BLD-MCNC: 7.3 G/DL — LOW (ref 11.5–15.5)
IMM GRANULOCYTES NFR BLD AUTO: 0.7 % — SIGNIFICANT CHANGE UP (ref 0–0.9)
LYMPHOCYTES # BLD AUTO: 0.58 K/UL — LOW (ref 1–3.3)
LYMPHOCYTES # BLD AUTO: 6.7 % — LOW (ref 13–44)
MCHC RBC-ENTMCNC: 28.6 PG — SIGNIFICANT CHANGE UP (ref 27–34)
MCHC RBC-ENTMCNC: 28.7 PG — SIGNIFICANT CHANGE UP (ref 27–34)
MCHC RBC-ENTMCNC: 29 PG — SIGNIFICANT CHANGE UP (ref 27–34)
MCHC RBC-ENTMCNC: 31.3 G/DL — LOW (ref 32–36)
MCHC RBC-ENTMCNC: 32.1 G/DL — SIGNIFICANT CHANGE UP (ref 32–36)
MCHC RBC-ENTMCNC: 32.4 G/DL — SIGNIFICANT CHANGE UP (ref 32–36)
MCV RBC AUTO: 88.6 FL — SIGNIFICANT CHANGE UP (ref 80–100)
MCV RBC AUTO: 90.5 FL — SIGNIFICANT CHANGE UP (ref 80–100)
MCV RBC AUTO: 91.4 FL — SIGNIFICANT CHANGE UP (ref 80–100)
MONOCYTES # BLD AUTO: 0.81 K/UL — SIGNIFICANT CHANGE UP (ref 0–0.9)
MONOCYTES NFR BLD AUTO: 9.4 % — SIGNIFICANT CHANGE UP (ref 2–14)
NEUTROPHILS # BLD AUTO: 7.17 K/UL — SIGNIFICANT CHANGE UP (ref 1.8–7.4)
NEUTROPHILS NFR BLD AUTO: 83.1 % — HIGH (ref 43–77)
NRBC # BLD: 0 /100 WBCS — SIGNIFICANT CHANGE UP (ref 0–0)
PLATELET # BLD AUTO: 170 K/UL — SIGNIFICANT CHANGE UP (ref 150–400)
PLATELET # BLD AUTO: 175 K/UL — SIGNIFICANT CHANGE UP (ref 150–400)
PLATELET # BLD AUTO: 175 K/UL — SIGNIFICANT CHANGE UP (ref 150–400)
POTASSIUM SERPL-MCNC: 3.9 MMOL/L — SIGNIFICANT CHANGE UP (ref 3.5–5.3)
POTASSIUM SERPL-SCNC: 3.9 MMOL/L — SIGNIFICANT CHANGE UP (ref 3.5–5.3)
RBC # BLD: 2.1 M/UL — LOW (ref 3.8–5.2)
RBC # BLD: 2.2 M/UL — LOW (ref 3.8–5.2)
RBC # BLD: 2.54 M/UL — LOW (ref 3.8–5.2)
RBC # FLD: 13.5 % — SIGNIFICANT CHANGE UP (ref 10.3–14.5)
RBC # FLD: 13.6 % — SIGNIFICANT CHANGE UP (ref 10.3–14.5)
RBC # FLD: 15.6 % — HIGH (ref 10.3–14.5)
SODIUM SERPL-SCNC: 137 MMOL/L — SIGNIFICANT CHANGE UP (ref 135–145)
WBC # BLD: 8 K/UL — SIGNIFICANT CHANGE UP (ref 3.8–10.5)
WBC # BLD: 8.63 K/UL — SIGNIFICANT CHANGE UP (ref 3.8–10.5)
WBC # BLD: 8.86 K/UL — SIGNIFICANT CHANGE UP (ref 3.8–10.5)
WBC # FLD AUTO: 8 K/UL — SIGNIFICANT CHANGE UP (ref 3.8–10.5)
WBC # FLD AUTO: 8.63 K/UL — SIGNIFICANT CHANGE UP (ref 3.8–10.5)
WBC # FLD AUTO: 8.86 K/UL — SIGNIFICANT CHANGE UP (ref 3.8–10.5)

## 2024-12-24 PROCEDURE — 99233 SBSQ HOSP IP/OBS HIGH 50: CPT

## 2024-12-24 RX ORDER — ACETAMINOPHEN 80 MG/.8ML
1000 SOLUTION/ DROPS ORAL ONCE
Refills: 0 | Status: COMPLETED | OUTPATIENT
Start: 2024-12-24 | End: 2024-12-24

## 2024-12-24 RX ORDER — BISACODYL 5 MG
10 TABLET, DELAYED RELEASE (ENTERIC COATED) ORAL ONCE
Refills: 0 | Status: COMPLETED | OUTPATIENT
Start: 2024-12-24 | End: 2024-12-24

## 2024-12-24 RX ORDER — MORPHINE SULFATE 15 MG
1 TABLET, EXTENDED RELEASE ORAL EVERY 4 HOURS
Refills: 0 | Status: DISCONTINUED | OUTPATIENT
Start: 2024-12-24 | End: 2024-12-25

## 2024-12-24 RX ORDER — MORPHINE SULFATE 15 MG
2 TABLET, EXTENDED RELEASE ORAL EVERY 4 HOURS
Refills: 0 | Status: DISCONTINUED | OUTPATIENT
Start: 2024-12-24 | End: 2024-12-25

## 2024-12-24 RX ADMIN — SODIUM CHLORIDE 100 MILLILITER(S): 9 INJECTION, SOLUTION INTRAVENOUS at 21:52

## 2024-12-24 RX ADMIN — Medication 100 MILLIGRAM(S): at 04:03

## 2024-12-24 RX ADMIN — ACETAMINOPHEN 400 MILLIGRAM(S): 80 SOLUTION/ DROPS ORAL at 21:51

## 2024-12-24 RX ADMIN — Medication 1 MILLIGRAM(S): at 09:50

## 2024-12-24 RX ADMIN — Medication 10 MILLIGRAM(S): at 13:20

## 2024-12-24 RX ADMIN — Medication 1 MILLIGRAM(S): at 20:20

## 2024-12-24 RX ADMIN — Medication 1 MILLIGRAM(S): at 19:12

## 2024-12-24 NOTE — PHYSICAL THERAPY INITIAL EVALUATION ADULT - PERTINENT HX OF CURRENT PROBLEM, REHAB EVAL
84yF s/p WINIFRED, Exploration of fusion, extension of fusion to T1 w/ Tethers to C7 by Dr. TONO Salguero on 12-23

## 2024-12-24 NOTE — PHYSICAL THERAPY INITIAL EVALUATION ADULT - HEALTH SCREEN CRITERIA
[FreeTextEntry1] : 31 yo asymptomatic pt with h/o subclinical hypothyroidism during pregnancy Rxed with low dose LT4 25 mcg,stopped after birth.\par PE unremarkable.clinically euthyroid pt.\par labs ,will f/u. yes

## 2024-12-24 NOTE — PHYSICAL THERAPY INITIAL EVALUATION ADULT - ADDITIONAL COMMENTS
Prior to admission pt reports living alone in an elevator apt, no steps to enter, indep with ADLs and uses cane for outdoor mobility

## 2024-12-24 NOTE — PROGRESS NOTE ADULT - SUBJECTIVE AND OBJECTIVE BOX
POST OPERATIVE DAY #: 1  STATUS POST:     WINIFRED, Exploration of fusion, extension of fusion to T1 w/ Tethers to C7                SUBJECTIVE: Patient seen and examined. Pt. states she is having some upper back pain with tingling in her hands. Pt. also had loose BM today.   Denies any sob/cp/n/v.     OBJECTIVE:     Vital Signs Last 24 Hrs  T(C): 36.9 (24 Dec 2024 12:40), Max: 37.4 (23 Dec 2024 20:46)  T(F): 98.5 (24 Dec 2024 12:40), Max: 99.3 (23 Dec 2024 20:46)  HR: 90 (24 Dec 2024 12:40) (85 - 101)  BP: 126/62 (24 Dec 2024 12:40) (123/59 - 179/82)  BP(mean): 87 (24 Dec 2024 09:40) (87 - 118)  RR: 17 (24 Dec 2024 12:40) (15 - 22)  SpO2: 98% (24 Dec 2024 12:40) (91% - 99%)    Parameters below as of 24 Dec 2024 12:40  Patient On (Oxygen Delivery Method): room air        General: NAD   Affected extremity: BILATERAL UES skin intact, no erythema/ecchymosis/sts  Dressing: clean/dry/intact with 1 drain   Sensation: intact to light touch to patient's baseline  Motor exam: slt intact,  brachial/radial pulses 2+, biceps/triceps/delts, , finger int 5/5               I&O's Detail    23 Dec 2024 07:01  -  24 Dec 2024 07:00  --------------------------------------------------------  IN:    IV PiggyBack: 50 mL    Lactated Ringers: 100 mL  Total IN: 150 mL    OUT:    Accordian (mL): 350 mL    Nasogastric/Oral tube (mL): 50 mL    Voided (mL): 300 mL  Total OUT: 700 mL    Total NET: -550 mL          LABS:                        6.3    8.86  )-----------( 175      ( 24 Dec 2024 06:53 )             20.1     12-24    137  |  99  |  29[H]  ----------------------------<  106[H]  3.9   |  29  |  1.18    Ca    8.0[L]      24 Dec 2024 05:30        Urinalysis Basic - ( 24 Dec 2024 05:30 )    Color: x / Appearance: x / SG: x / pH: x  Gluc: 106 mg/dL / Ketone: x  / Bili: x / Urobili: x   Blood: x / Protein: x / Nitrite: x   Leuk Esterase: x / RBC: x / WBC x   Sq Epi: x / Non Sq Epi: x / Bacteria: x        MEDICATIONS:  hydroxychloroquine 200 milliGRAM(s) Oral two times a day    acetaminophen   IVPB .. 1000 milliGRAM(s) IV Intermittent once  DULoxetine 30 milliGRAM(s) Oral daily  HYDROmorphone  Injectable 0.5 milliGRAM(s) IV Push every 4 hours PRN  HYDROmorphone  Injectable 0.5 milliGRAM(s) IV Push every 15 minutes PRN  methocarbamol 500 milliGRAM(s) Oral every 8 hours  morphine  - Injectable 1 milliGRAM(s) IV Push every 4 hours PRN  morphine  - Injectable 2 milliGRAM(s) IV Push every 4 hours PRN  ondansetron   Disintegrating Tablet 4 milliGRAM(s) Oral every 6 hours PRN          ASSESSMENT AND PLAN: 85yo Female s/p for WINIFRED, Exploration of fusion, extension of fusion to T1 w/ Tethers to C7      1. Analgesic pain control  2. DVT prophylaxis:     SCDs        3. Weight Bearing Status:  Weight bearing as tolerated     4. Disposition: Home   5. H/H 6.1.20.1, cbc repeated, will transfuse 1uprbc today, Follow up 4pm cbc.   6. Appreciate gen sx recs for high OG tube output. NGT placed yesterday, NPO. Attempted disimpaction today, but no stool. Will give dulcolax suppository at this time.  POST OPERATIVE DAY #: 1  STATUS POST:     WINIFRED, Exploration of fusion, extension of fusion to T1 w/ Tethers to C7                SUBJECTIVE: Patient seen and examined. Pt. states she is having some upper back pain with tingling in her hands. Pt. also had loose BM today.   Denies any sob/cp/n/v.     OBJECTIVE:     Vital Signs Last 24 Hrs  T(C): 36.9 (24 Dec 2024 12:40), Max: 37.4 (23 Dec 2024 20:46)  T(F): 98.5 (24 Dec 2024 12:40), Max: 99.3 (23 Dec 2024 20:46)  HR: 90 (24 Dec 2024 12:40) (85 - 101)  BP: 126/62 (24 Dec 2024 12:40) (123/59 - 179/82)  BP(mean): 87 (24 Dec 2024 09:40) (87 - 118)  RR: 17 (24 Dec 2024 12:40) (15 - 22)  SpO2: 98% (24 Dec 2024 12:40) (91% - 99%)    Parameters below as of 24 Dec 2024 12:40  Patient On (Oxygen Delivery Method): room air        General: NAD   Affected extremity: BILATERAL UES skin intact, no erythema/ecchymosis/sts  Dressing: clean/dry/intact with 1 drain   Sensation: intact to light touch to patient's baseline  Motor exam: slt intact,  brachial/radial pulses 2+, biceps/triceps/delts, , finger int 5/5               I&O's Detail    23 Dec 2024 07:01  -  24 Dec 2024 07:00  --------------------------------------------------------  IN:    IV PiggyBack: 50 mL    Lactated Ringers: 100 mL  Total IN: 150 mL    OUT:    Accordian (mL): 350 mL    Nasogastric/Oral tube (mL): 50 mL    Voided (mL): 300 mL  Total OUT: 700 mL    Total NET: -550 mL          LABS:                        6.3    8.86  )-----------( 175      ( 24 Dec 2024 06:53 )             20.1     12-24    137  |  99  |  29[H]  ----------------------------<  106[H]  3.9   |  29  |  1.18    Ca    8.0[L]      24 Dec 2024 05:30        Urinalysis Basic - ( 24 Dec 2024 05:30 )    Color: x / Appearance: x / SG: x / pH: x  Gluc: 106 mg/dL / Ketone: x  / Bili: x / Urobili: x   Blood: x / Protein: x / Nitrite: x   Leuk Esterase: x / RBC: x / WBC x   Sq Epi: x / Non Sq Epi: x / Bacteria: x        MEDICATIONS:  hydroxychloroquine 200 milliGRAM(s) Oral two times a day    acetaminophen   IVPB .. 1000 milliGRAM(s) IV Intermittent once  DULoxetine 30 milliGRAM(s) Oral daily  HYDROmorphone  Injectable 0.5 milliGRAM(s) IV Push every 4 hours PRN  HYDROmorphone  Injectable 0.5 milliGRAM(s) IV Push every 15 minutes PRN  methocarbamol 500 milliGRAM(s) Oral every 8 hours  morphine  - Injectable 1 milliGRAM(s) IV Push every 4 hours PRN  morphine  - Injectable 2 milliGRAM(s) IV Push every 4 hours PRN  ondansetron   Disintegrating Tablet 4 milliGRAM(s) Oral every 6 hours PRN          ASSESSMENT AND PLAN: 83yo Female s/p for WINIFRED, Exploration of fusion, extension of fusion to T1 w/ Tethers to C7      1. Analgesic pain control  2. DVT prophylaxis:     SCDs        3. Weight Bearing Status:  Weight bearing as tolerated     4. Disposition: Home   5. H/H 6.1.20.1, cbc repeated, will transfuse 1uprbc today, Follow up 4pm cbc.   6. Appreciate gen sx recs for high OG tube output. NGT placed yesterday, NPO. Disimpaction done  today at bedside, but no stool. Will give dulcolax suppository at this time. Follow up gen sx recs

## 2024-12-24 NOTE — PHYSICAL THERAPY INITIAL EVALUATION ADULT - GENERAL OBSERVATIONS, REHAB EVAL
PT IE Completed, RN Lindsay aware of intent to treat, pt received in supine with +NG tube to wall suction, +tele, +BP cuff, +pulse ox, +scds

## 2024-12-24 NOTE — PROGRESS NOTE ADULT - ASSESSMENT
84y Female pending OR 12/23 for WINIFRED, Exploration of fusion, extension of fusion to C7 (C7-T3). General surgery consulted after intra-op placement of OG tube after induction led to 1.3L brown gastric contents appearing fluid, the procedure had already started at the time of consult. Prior to procedure the patient had nausea/retching but no vomiting the night prior and was thought to be due possible poor food tolerance, x-ray obtained showed fecal impaction but obstructive findings on the abdominal film. Pts last meal was last night and is not on any GI motility slowing medications.  No acute surgical needs or intervention, the OG tube is possibly in the too distal therefore causing the output given that the x-ray from night before was generall unremarkable. Please place/keep NG tube in place post-procedure  84y Female pending OR 12/23 for WINIFRED, Exploration of fusion, extension of fusion to C7 (C7-T3). General surgery consulted after intra-op placement of OG tube after induction led to 1.3L brown gastric contents appearing fluid, the procedure had already started at the time of consult. Prior to procedure the patient had nausea/retching but no vomiting the night prior and was thought to be due possible poor food tolerance, x-ray obtained showed fecal impaction but obstructive findings on the abdominal film. Pts last meal was last night and is not on any GI motility slowing medications.  No acute surgical needs or intervention, continue NG tube until the patient has consistent bowel function, at which point the NG tube may be discontinued and her diet can be advanced as tolerated.    NPO  Continue NG tube until patient is passing consistent flatus  Disimpact as needed for rectal stool impaction  Limit opioid use  Team 4c will continue to follow

## 2024-12-24 NOTE — PROGRESS NOTE ADULT - SUBJECTIVE AND OBJECTIVE BOX
24HR EVENTS:     SUBJECTIVE: Pt seen and examined on morning rounds. Pain well controlled. Patient denies CP/SOB/N/V. Urinating without issue.      Vital Signs Last 24 Hrs  T(C): 36.6 (24 Dec 2024 04:08), Max: 37.4 (23 Dec 2024 20:46)  T(F): 97.8 (24 Dec 2024 04:08), Max: 99.3 (23 Dec 2024 20:46)  HR: 100 (24 Dec 2024 04:08) (85 - 102)  BP: 123/59 (24 Dec 2024 04:08) (123/59 - 179/82)  BP(mean): 107 (23 Dec 2024 17:46) (105 - 118)  RR: 19 (24 Dec 2024 04:08) (15 - 22)  SpO2: 97% (24 Dec 2024 04:08) (91% - 99%)    Parameters below as of 24 Dec 2024 04:08  Patient On (Oxygen Delivery Method): room air        Physical Exam:  General: NAD, resting comfortably in bed    RLE:  SILT L2-S1, symmetric  Motor 5/5 L2-S1, symmetric  Pulses 2+    LLE:  SILT L2-S1, symmetric  Motor 5/5 L2-S1, symmetric  Pulses 2+      Assessment/Plan:  84yF s/p WINIFRED, Exploration of fusion, extension of fusion to T1 w/ Tethers to C7 by Dr. TONO Salguero on 12-23  - Weight Bearing Status: WBAT  - Pain control  - DVT PPx: SCDs  - PT rec: Pending PT  - F/u AM labs  - Dispo: Pending PT  - NG tube in place, NPO, appreciate gen surg recs  - Continue drain    Jacob Lopez, PGY-1  Orthopedic Surgery

## 2024-12-24 NOTE — DIETITIAN INITIAL EVALUATION ADULT - ADD RECOMMEND
1. Advance diet as tolerated from clear liquids to full liquids to low-fiber as soon as medically feasible as per MD orders to maximize nutrient intake   **If unable to advance diet within 24-48 hrs, please re-consult RD for nutrition support recommendations   2. Once feasible, encourage and monitor PO intake, honor preferences as able   >> Consistently meet >75% of estimated needs during admission   >> Consider oral nutrition supplement or liberalizing diet should intake decline </= 50%   3. Monitor wt trends, GI function, skin integrity  4. Monitor lytes, renal indices, blood glucose, LFTs    5. Pain and bowel regimen per team   6. Anti-emetic PRN to promote PO intake   RD will continue to monitor PO intake, labs, hydration, and wt prn.

## 2024-12-24 NOTE — DIETITIAN INITIAL EVALUATION ADULT - PERTINENT LABORATORY DATA
12-24    137  |  99  |  29[H]  ----------------------------<  106[H]  3.9   |  29  |  1.18    Ca    8.0[L]      24 Dec 2024 05:30

## 2024-12-24 NOTE — PROGRESS NOTE ADULT - SUBJECTIVE AND OBJECTIVE BOX
Patient is a 84y old  Female who presents with a chief complaint of back pain (24 Dec 2024 10:49)      INTERVAL HPI/OVERNIGHT EVENTS: offers no new complaints; current symptoms resolving    MEDICATIONS  (STANDING):  acetaminophen   IVPB .. 1000 milliGRAM(s) IV Intermittent once  atorvastatin 80 milliGRAM(s) Oral at bedtime  bisacodyl Suppository 10 milliGRAM(s) Rectal once  diltiazem    milliGRAM(s) Oral daily  DULoxetine 30 milliGRAM(s) Oral daily  hydroxychloroquine 200 milliGRAM(s) Oral two times a day  lactated ringers. 1000 milliLiter(s) (100 mL/Hr) IV Continuous <Continuous>  methocarbamol 500 milliGRAM(s) Oral every 8 hours  pantoprazole    Tablet 40 milliGRAM(s) Oral before breakfast  polyethylene glycol 3350 17 Gram(s) Oral at bedtime  senna 2 Tablet(s) Oral at bedtime    MEDICATIONS  (PRN):  albuterol    90 MICROgram(s) HFA Inhaler 2 Puff(s) Inhalation every 6 hours PRN PRN  HYDROmorphone  Injectable 0.5 milliGRAM(s) IV Push every 4 hours PRN breakthrough pain on floor  HYDROmorphone  Injectable 0.5 milliGRAM(s) IV Push every 15 minutes PRN breakthrough pain in PACU  morphine  - Injectable 1 milliGRAM(s) IV Push every 4 hours PRN Moderate Pain (4 - 6)  morphine  - Injectable 2 milliGRAM(s) IV Push every 4 hours PRN Severe Pain (7 - 10)  ondansetron   Disintegrating Tablet 4 milliGRAM(s) Oral every 6 hours PRN Nausea and/or Vomiting      __________________________________________________  REVIEW OF SYSTEMS:    CONSTITUTIONAL: No fever,   EYES: no acute visual disturbances  NECK: No pain or stiffness  RESPIRATORY: No cough; No shortness of breath  CARDIOVASCULAR: No chest pain, no palpitations  GASTROINTESTINAL: No pain. No nausea or vomiting; No diarrhea   NEUROLOGICAL: No headache or numbness, no tremors  MUSCULOSKELETAL: No joint pain, no muscle pain  GENITOURINARY: no dysuria, no frequency, no hesitancy  PSYCHIATRY: no depression , no anxiety  ALL OTHER  ROS negative        Vital Signs Last 24 Hrs  T(C): 36.6 (24 Dec 2024 09:25), Max: 37.4 (23 Dec 2024 20:46)  T(F): 97.8 (24 Dec 2024 09:25), Max: 99.3 (23 Dec 2024 20:46)  HR: 87 (24 Dec 2024 09:25) (85 - 101)  BP: 130/60 (24 Dec 2024 09:25) (123/59 - 179/82)  BP(mean): 87 (24 Dec 2024 09:25) (87 - 118)  RR: 18 (24 Dec 2024 09:25) (15 - 22)  SpO2: 96% (24 Dec 2024 09:25) (91% - 99%)    Parameters below as of 24 Dec 2024 09:25  Patient On (Oxygen Delivery Method): room air        ________________________________________________  PHYSICAL EXAM:  GENERAL: NAD  HEENT: Normocephalic;  conjunctivae and sclerae clear; moist mucous membranes;   NECK : supple  CHEST/LUNG: Clear to auscultation bilaterally with good air entry   HEART: S1 S2  regular; no murmurs, gallops or rubs  ABDOMEN: Soft, Nontender, Nondistended; Bowel sounds present  EXTREMITIES: no cyanosis; no edema; no calf tenderness  SKIN: warm and dry; no rash  NERVOUS SYSTEM:  Awake and alert; Oriented  to place, person and time ; no new deficits    _________________________________________________  LABS:                        6.3    8.86  )-----------( 175      ( 24 Dec 2024 06:53 )             20.1     12-24    137  |  99  |  29[H]  ----------------------------<  106[H]  3.9   |  29  |  1.18    Ca    8.0[L]      24 Dec 2024 05:30        Urinalysis Basic - ( 24 Dec 2024 05:30 )    Color: x / Appearance: x / SG: x / pH: x  Gluc: 106 mg/dL / Ketone: x  / Bili: x / Urobili: x   Blood: x / Protein: x / Nitrite: x   Leuk Esterase: x / RBC: x / WBC x   Sq Epi: x / Non Sq Epi: x / Bacteria: x      CAPILLARY BLOOD GLUCOSE            RADIOLOGY & ADDITIONAL TESTS:      Plan of care was discussed with patient and /or primary care giver; all questions and concerns were addressed and care was aligned with patient's wishes.       Patient is a 84y old  Female who presents with a chief complaint of back pain (24 Dec 2024 10:49)      INTERVAL HPI/OVERNIGHT EVENTS: offers no new complaints; NG tube in place. Fecal disimpaction today     MEDICATIONS  (STANDING):  acetaminophen   IVPB .. 1000 milliGRAM(s) IV Intermittent once  atorvastatin 80 milliGRAM(s) Oral at bedtime  bisacodyl Suppository 10 milliGRAM(s) Rectal once  diltiazem    milliGRAM(s) Oral daily  DULoxetine 30 milliGRAM(s) Oral daily  hydroxychloroquine 200 milliGRAM(s) Oral two times a day  lactated ringers. 1000 milliLiter(s) (100 mL/Hr) IV Continuous <Continuous>  methocarbamol 500 milliGRAM(s) Oral every 8 hours  pantoprazole    Tablet 40 milliGRAM(s) Oral before breakfast  polyethylene glycol 3350 17 Gram(s) Oral at bedtime  senna 2 Tablet(s) Oral at bedtime    MEDICATIONS  (PRN):  albuterol    90 MICROgram(s) HFA Inhaler 2 Puff(s) Inhalation every 6 hours PRN PRN  HYDROmorphone  Injectable 0.5 milliGRAM(s) IV Push every 4 hours PRN breakthrough pain on floor  HYDROmorphone  Injectable 0.5 milliGRAM(s) IV Push every 15 minutes PRN breakthrough pain in PACU  morphine  - Injectable 1 milliGRAM(s) IV Push every 4 hours PRN Moderate Pain (4 - 6)  morphine  - Injectable 2 milliGRAM(s) IV Push every 4 hours PRN Severe Pain (7 - 10)  ondansetron   Disintegrating Tablet 4 milliGRAM(s) Oral every 6 hours PRN Nausea and/or Vomiting        Vital Signs Last 24 Hrs  T(C): 36.6 (24 Dec 2024 09:25), Max: 37.4 (23 Dec 2024 20:46)  T(F): 97.8 (24 Dec 2024 09:25), Max: 99.3 (23 Dec 2024 20:46)  HR: 87 (24 Dec 2024 09:25) (85 - 101)  BP: 130/60 (24 Dec 2024 09:25) (123/59 - 179/82)  BP(mean): 87 (24 Dec 2024 09:25) (87 - 118)  RR: 18 (24 Dec 2024 09:25) (15 - 22)  SpO2: 96% (24 Dec 2024 09:25) (91% - 99%)    Parameters below as of 24 Dec 2024 09:25  Patient On (Oxygen Delivery Method): room air        ________________________________________________  PHYSICAL EXAM:  GENERAL: NAD  HEENT: ; moist mucous membranes;   NECK : supple  CHEST/LUNG: Clear to auscultation bilaterally  HEART: S1 S2  regular  ABDOMEN: Soft, Nontender, Nondistended  EXTREMITIES: no cyanosis; no edema; no calf tenderness  SKIN: warm and dry; no rash  NERVOUS SYSTEM:  Awake and alert; Oriented  to place, person and time ; no new deficits    _________________________________________________  LABS:                        6.3    8.86  )-----------( 175      ( 24 Dec 2024 06:53 )             20.1     12-24    137  |  99  |  29[H]  ----------------------------<  106[H]  3.9   |  29  |  1.18    Ca    8.0[L]      24 Dec 2024 05:30        Urinalysis Basic - ( 24 Dec 2024 05:30 )    Color: x / Appearance: x / SG: x / pH: x  Gluc: 106 mg/dL / Ketone: x  / Bili: x / Urobili: x   Blood: x / Protein: x / Nitrite: x   Leuk Esterase: x / RBC: x / WBC x   Sq Epi: x / Non Sq Epi: x / Bacteria: x      CAPILLARY BLOOD GLUCOSE            RADIOLOGY & ADDITIONAL TESTS:      Plan of care was discussed with patient and /or primary care giver; all questions and concerns were addressed and care was aligned with patient's wishes.

## 2024-12-24 NOTE — DIETITIAN INITIAL EVALUATION ADULT - PERTINENT MEDS FT
MEDICATIONS  (STANDING):  acetaminophen   IVPB .. 1000 milliGRAM(s) IV Intermittent once  atorvastatin 80 milliGRAM(s) Oral at bedtime  diltiazem    milliGRAM(s) Oral daily  DULoxetine 30 milliGRAM(s) Oral daily  hydroxychloroquine 200 milliGRAM(s) Oral two times a day  lactated ringers. 1000 milliLiter(s) (100 mL/Hr) IV Continuous <Continuous>  methocarbamol 500 milliGRAM(s) Oral every 8 hours  pantoprazole    Tablet 40 milliGRAM(s) Oral before breakfast  polyethylene glycol 3350 17 Gram(s) Oral at bedtime  senna 2 Tablet(s) Oral at bedtime    MEDICATIONS  (PRN):  albuterol    90 MICROgram(s) HFA Inhaler 2 Puff(s) Inhalation every 6 hours PRN PRN  HYDROmorphone  Injectable 0.5 milliGRAM(s) IV Push every 4 hours PRN breakthrough pain on floor  HYDROmorphone  Injectable 0.5 milliGRAM(s) IV Push every 15 minutes PRN breakthrough pain in PACU  morphine  - Injectable 1 milliGRAM(s) IV Push every 4 hours PRN Moderate Pain (4 - 6)  morphine  - Injectable 2 milliGRAM(s) IV Push every 4 hours PRN Severe Pain (7 - 10)  ondansetron   Disintegrating Tablet 4 milliGRAM(s) Oral every 6 hours PRN Nausea and/or Vomiting

## 2024-12-24 NOTE — PROVIDER CONTACT NOTE (CRITICAL VALUE NOTIFICATION) - ACTION/TREATMENT ORDERED:
John huertas MD ordered a stat repeat CBC, gotten consent from patient and awaiting repeat CBC result

## 2024-12-24 NOTE — PROGRESS NOTE ADULT - SUBJECTIVE AND OBJECTIVE BOX
INTERVAL HPI/OVERNIGHT EVENTS:     STATUS POST:  WINIFRED, Exploration of fusion, extension of fusion to T1 w/ Tethers to C7 by Dr. TONO Salguero on 12-23    POST OPERATIVE DAY #: 1    SUBJECTIVE:  Patient seen and examined    MEDICATIONS  (STANDING):  acetaminophen   IVPB .. 1000 milliGRAM(s) IV Intermittent once  atorvastatin 80 milliGRAM(s) Oral at bedtime  diltiazem    milliGRAM(s) Oral daily  DULoxetine 30 milliGRAM(s) Oral daily  hydroxychloroquine 200 milliGRAM(s) Oral two times a day  lactated ringers. 1000 milliLiter(s) (100 mL/Hr) IV Continuous <Continuous>  methocarbamol 500 milliGRAM(s) Oral every 8 hours  pantoprazole    Tablet 40 milliGRAM(s) Oral before breakfast  polyethylene glycol 3350 17 Gram(s) Oral at bedtime  senna 2 Tablet(s) Oral at bedtime    MEDICATIONS  (PRN):  albuterol    90 MICROgram(s) HFA Inhaler 2 Puff(s) Inhalation every 6 hours PRN PRN  HYDROmorphone  Injectable 0.5 milliGRAM(s) IV Push every 4 hours PRN breakthrough pain on floor  HYDROmorphone  Injectable 0.5 milliGRAM(s) IV Push every 15 minutes PRN breakthrough pain in PACU  morphine  - Injectable 1 milliGRAM(s) IV Push every 4 hours PRN Moderate Pain (4 - 6)  morphine  - Injectable 2 milliGRAM(s) IV Push every 4 hours PRN Severe Pain (7 - 10)  ondansetron   Disintegrating Tablet 4 milliGRAM(s) Oral every 6 hours PRN Nausea and/or Vomiting      Vital Signs Last 24 Hrs  T(C): 36.6 (24 Dec 2024 09:25), Max: 37.4 (23 Dec 2024 20:46)  T(F): 97.8 (24 Dec 2024 09:25), Max: 99.3 (23 Dec 2024 20:46)  HR: 87 (24 Dec 2024 09:25) (85 - 101)  BP: 130/60 (24 Dec 2024 09:25) (123/59 - 179/82)  BP(mean): 87 (24 Dec 2024 09:25) (87 - 118)  RR: 18 (24 Dec 2024 09:25) (15 - 22)  SpO2: 96% (24 Dec 2024 09:25) (91% - 99%)    Parameters below as of 24 Dec 2024 09:25  Patient On (Oxygen Delivery Method): room air        Physical exam:  General Appearance: Appears well, NAD  Pulmonary: Nonlabored breathing, no respiratory distress  HEENT: [facility]  Abdomen: Soft, nondistended, nontender  Extremities: WWP, SCD's in place     I&O's Detail    23 Dec 2024 07:01  -  24 Dec 2024 07:00  --------------------------------------------------------  IN:    IV PiggyBack: 50 mL    Lactated Ringers: 100 mL  Total IN: 150 mL    OUT:    Accordian (mL): 350 mL    Nasogastric/Oral tube (mL): 50 mL    Voided (mL): 300 mL  Total OUT: 700 mL    Total NET: -550 mL          LABS:                        6.3    8.86  )-----------( 175      ( 24 Dec 2024 06:53 )             20.1     12-24    137  |  99  |  29[H]  ----------------------------<  106[H]  3.9   |  29  |  1.18    Ca    8.0[L]      24 Dec 2024 05:30        Urinalysis Basic - ( 24 Dec 2024 05:30 )    Color: x / Appearance: x / SG: x / pH: x  Gluc: 106 mg/dL / Ketone: x  / Bili: x / Urobili: x   Blood: x / Protein: x / Nitrite: x   Leuk Esterase: x / RBC: x / WBC x   Sq Epi: x / Non Sq Epi: x / Bacteria: x        RADIOLOGY & ADDITIONAL STUDIES: INTERVAL HPI/OVERNIGHT EVENTS:     STATUS POST:  WINIFRED, Exploration of fusion, extension of fusion to T1 w/ Tethers to C7 by Dr. TONO Salguero on 12-23    POST OPERATIVE DAY #: 1    SUBJECTIVE:  Patient seen and examined. NGT in place post-procedure. Reports that she is not experiencing any nausea or vomiting. She denies passing any gas or bowel movements. Reports some distention, although improved from before surgery. She additionally states that prior to surgery she had increased narcotic usage secondary to pain.    MEDICATIONS  (STANDING):  acetaminophen   IVPB .. 1000 milliGRAM(s) IV Intermittent once  atorvastatin 80 milliGRAM(s) Oral at bedtime  diltiazem    milliGRAM(s) Oral daily  DULoxetine 30 milliGRAM(s) Oral daily  hydroxychloroquine 200 milliGRAM(s) Oral two times a day  lactated ringers. 1000 milliLiter(s) (100 mL/Hr) IV Continuous <Continuous>  methocarbamol 500 milliGRAM(s) Oral every 8 hours  pantoprazole    Tablet 40 milliGRAM(s) Oral before breakfast  polyethylene glycol 3350 17 Gram(s) Oral at bedtime  senna 2 Tablet(s) Oral at bedtime    MEDICATIONS  (PRN):  albuterol    90 MICROgram(s) HFA Inhaler 2 Puff(s) Inhalation every 6 hours PRN PRN  HYDROmorphone  Injectable 0.5 milliGRAM(s) IV Push every 4 hours PRN breakthrough pain on floor  HYDROmorphone  Injectable 0.5 milliGRAM(s) IV Push every 15 minutes PRN breakthrough pain in PACU  morphine  - Injectable 1 milliGRAM(s) IV Push every 4 hours PRN Moderate Pain (4 - 6)  morphine  - Injectable 2 milliGRAM(s) IV Push every 4 hours PRN Severe Pain (7 - 10)  ondansetron   Disintegrating Tablet 4 milliGRAM(s) Oral every 6 hours PRN Nausea and/or Vomiting      Vital Signs Last 24 Hrs  T(C): 36.6 (24 Dec 2024 09:25), Max: 37.4 (23 Dec 2024 20:46)  T(F): 97.8 (24 Dec 2024 09:25), Max: 99.3 (23 Dec 2024 20:46)  HR: 87 (24 Dec 2024 09:25) (85 - 101)  BP: 130/60 (24 Dec 2024 09:25) (123/59 - 179/82)  BP(mean): 87 (24 Dec 2024 09:25) (87 - 118)  RR: 18 (24 Dec 2024 09:25) (15 - 22)  SpO2: 96% (24 Dec 2024 09:25) (91% - 99%)    Parameters below as of 24 Dec 2024 09:25  Patient On (Oxygen Delivery Method): room air        Physical exam:  General Appearance: Appears well, NAD, NGT in place with bilious output  Pulmonary: Nonlabored breathing, no respiratory distress  HEENT: NTAC  Abdomen: Soft, mildly distended, nontender  Extremities: WWP, SCD's in place     I&O's Detail    23 Dec 2024 07:01  -  24 Dec 2024 07:00  --------------------------------------------------------  IN:    IV PiggyBack: 50 mL    Lactated Ringers: 100 mL  Total IN: 150 mL    OUT:    Accordian (mL): 350 mL    Nasogastric/Oral tube (mL): 50 mL    Voided (mL): 300 mL  Total OUT: 700 mL    Total NET: -550 mL          LABS:                        6.3    8.86  )-----------( 175      ( 24 Dec 2024 06:53 )             20.1     12-24    137  |  99  |  29[H]  ----------------------------<  106[H]  3.9   |  29  |  1.18    Ca    8.0[L]      24 Dec 2024 05:30        Urinalysis Basic - ( 24 Dec 2024 05:30 )    Color: x / Appearance: x / SG: x / pH: x  Gluc: 106 mg/dL / Ketone: x  / Bili: x / Urobili: x   Blood: x / Protein: x / Nitrite: x   Leuk Esterase: x / RBC: x / WBC x   Sq Epi: x / Non Sq Epi: x / Bacteria: x        RADIOLOGY & ADDITIONAL STUDIES:

## 2024-12-24 NOTE — DIETITIAN INITIAL EVALUATION ADULT - OTHER CALCULATIONS
Based on dosing wt 138 pounds as pt within % ideal body weight (102%). Need adjusted for post-op healing, clinical course, advanced age.

## 2024-12-24 NOTE — PROGRESS NOTE ADULT - ASSESSMENT
84 year old female with a pmh of HTN, CAD, HLD, CKD stage 3, Erosive osteoarthritis, asthma and anxiety who is known to our orthopedic surgery with recent admission and spinal surgery in Nov. 2024, now returns to [facility] with severe back pain and is pending OR on Monday for removal of hardware, exploration of fusion and extension of fusion to C7 (C7 to T3).  Medicine is consulted for comanagement.     # back pain  - s/p WINIFRED, Exploration of fusion, extension of fusion to T1 w/ Tethers to C7 by Dr. TONO Salguero on 12-23  - pain regimen per primary team  - ensure bowel regimen  - can offer lidocaine patch   - can offer melatonin at night    #Fecal impaction   -NG placed on OR to LIWS  -Gen surg following   -Empty rectal vault on fecal disimpaction today   -NPO till return of BF   -IVF while NPO    #CAD  - no history of MI or stents  - EKG nonischemic  - continue statin when off NPO    # LA enlargement  Seen on echo in last admission.  - TTE showed left atrial dilation but normal EF and no significant valvular disease  - appears euvolemic  - continue with outpatient follow up with cardiology  - monitor for orthostatic symptoms    #HTN  - on home Valsartan 160 BID: please hold prior to OR, plan to resume after surgery.  - on diltiazem 120 mg at home - continue during perioperative period. monitor for hypotension.    #Asthma  - clear lungs, no acute exacerbation - albuterol inhaler PRN    #Erosive OA  - resume home dose of plaquenil     #CKD stage 3  - Cr stable  - avoid nephrotoxins  - monitor renal function and electrolytes    # Acute blood loss anemia  # chronic iron deficiency anemia  - Drop in H/H this am likely due to operative losses   - Receiving 1 PRBc today  - trend CBC  - maintain active T&S, two large bore IVs  - transfuse for Hgb <7

## 2024-12-24 NOTE — DIETITIAN INITIAL EVALUATION ADULT - ETIOLOGY
related to decreased ability to meet increased nutrient needs due to NPO with NGT in place status post exploration of fusion yesterday

## 2024-12-24 NOTE — DIETITIAN INITIAL EVALUATION ADULT - PERSON TAUGHT/METHOD
RD was able to briefly provide verbal nutrition education on typical diet progression pending NGT removal, pt receptive./verbal instruction/patient instructed

## 2024-12-24 NOTE — DIETITIAN INITIAL EVALUATION ADULT - NSFNSGIIOFT_GEN_A_CORE
12-23-24 @ 07:01  -  12-24-24 @ 07:00  --------------------------------------------------------  OUT:    Nasogastric/Oral tube (mL): 50 mL  Total OUT: 50 mL    Total NET: -50 mL

## 2024-12-24 NOTE — DIETITIAN INITIAL EVALUATION ADULT - OTHER INFO
"84F c/o back pain. Present for WINIFRED, Exploration of fusion, extension of fusion to C7 (C7-T3)."     Patient seen at bedside on 9WO for nutrition assessment, however, limited diet / wt hx obtained as RD assessment was interrupted by other healthcare provider upon visit, will continue to follow. Current diet order: remains NPO with NGT in place (placed yesterday following surgery due to nausea/ vomiting), pt complains of feeling hungry and eager for food this morning, reports last meal on Sunday 12/22 (x2 days ago). No known food allergies, pt unable to confirm at time of visit. Dosing weight: 138 pounds, BMI 21.6, no UBW obtained at this time. No pressure injuries documented, kaycee 19. No edema documented. GI: abdomen remains distended, as per ortho PA note, pt was disimpacted today and given a follow up suppository - states pt had 1 loose bowel movement today. Labs: BUN elevated, serum glucose 106 mg/dL, eGFR low. Meds: LR, protonix, miralax, senna, morphine PRN, zofran PRN. RD unable to obtain NFPE at this time as assessment was interrupted, will attempt to obtain upon follow up - does not meet criteria for protein-calorie malnutrition as per ASPEN guidelines. No cultural, ethnic, Moravian food preferences reported. See nutrition recommendations below.

## 2024-12-25 LAB
ANION GAP SERPL CALC-SCNC: 12 MMOL/L — SIGNIFICANT CHANGE UP (ref 5–17)
BASOPHILS # BLD AUTO: 0.04 K/UL — SIGNIFICANT CHANGE UP (ref 0–0.2)
BASOPHILS NFR BLD AUTO: 0.5 % — SIGNIFICANT CHANGE UP (ref 0–2)
BUN SERPL-MCNC: 27 MG/DL — HIGH (ref 7–23)
CALCIUM SERPL-MCNC: 8.6 MG/DL — SIGNIFICANT CHANGE UP (ref 8.4–10.5)
CHLORIDE SERPL-SCNC: 101 MMOL/L — SIGNIFICANT CHANGE UP (ref 96–108)
CO2 SERPL-SCNC: 27 MMOL/L — SIGNIFICANT CHANGE UP (ref 22–31)
CREAT SERPL-MCNC: 1 MG/DL — SIGNIFICANT CHANGE UP (ref 0.5–1.3)
EGFR: 56 ML/MIN/1.73M2 — LOW
EOSINOPHIL # BLD AUTO: 0.09 K/UL — SIGNIFICANT CHANGE UP (ref 0–0.5)
EOSINOPHIL NFR BLD AUTO: 1.2 % — SIGNIFICANT CHANGE UP (ref 0–6)
GLUCOSE SERPL-MCNC: 78 MG/DL — SIGNIFICANT CHANGE UP (ref 70–99)
HCT VFR BLD CALC: 26.4 % — LOW (ref 34.5–45)
HGB BLD-MCNC: 8.3 G/DL — LOW (ref 11.5–15.5)
IMM GRANULOCYTES NFR BLD AUTO: 0.8 % — SIGNIFICANT CHANGE UP (ref 0–0.9)
LYMPHOCYTES # BLD AUTO: 0.87 K/UL — LOW (ref 1–3.3)
LYMPHOCYTES # BLD AUTO: 11.2 % — LOW (ref 13–44)
MCHC RBC-ENTMCNC: 28.1 PG — SIGNIFICANT CHANGE UP (ref 27–34)
MCHC RBC-ENTMCNC: 31.4 G/DL — LOW (ref 32–36)
MCV RBC AUTO: 89.5 FL — SIGNIFICANT CHANGE UP (ref 80–100)
MONOCYTES # BLD AUTO: 0.8 K/UL — SIGNIFICANT CHANGE UP (ref 0–0.9)
MONOCYTES NFR BLD AUTO: 10.3 % — SIGNIFICANT CHANGE UP (ref 2–14)
NEUTROPHILS # BLD AUTO: 5.9 K/UL — SIGNIFICANT CHANGE UP (ref 1.8–7.4)
NEUTROPHILS NFR BLD AUTO: 76 % — SIGNIFICANT CHANGE UP (ref 43–77)
NRBC # BLD: 0 /100 WBCS — SIGNIFICANT CHANGE UP (ref 0–0)
PLATELET # BLD AUTO: 194 K/UL — SIGNIFICANT CHANGE UP (ref 150–400)
POTASSIUM SERPL-MCNC: 3.4 MMOL/L — LOW (ref 3.5–5.3)
POTASSIUM SERPL-SCNC: 3.4 MMOL/L — LOW (ref 3.5–5.3)
RBC # BLD: 2.95 M/UL — LOW (ref 3.8–5.2)
RBC # FLD: 15.5 % — HIGH (ref 10.3–14.5)
SODIUM SERPL-SCNC: 140 MMOL/L — SIGNIFICANT CHANGE UP (ref 135–145)
WBC # BLD: 7.76 K/UL — SIGNIFICANT CHANGE UP (ref 3.8–10.5)
WBC # FLD AUTO: 7.76 K/UL — SIGNIFICANT CHANGE UP (ref 3.8–10.5)

## 2024-12-25 PROCEDURE — 99233 SBSQ HOSP IP/OBS HIGH 50: CPT

## 2024-12-25 RX ORDER — OXYCODONE HCL 15 MG
10 TABLET ORAL EVERY 4 HOURS
Refills: 0 | Status: DISCONTINUED | OUTPATIENT
Start: 2024-12-25 | End: 2024-12-25

## 2024-12-25 RX ORDER — ACETAMINOPHEN 80 MG/.8ML
1000 SOLUTION/ DROPS ORAL EVERY 8 HOURS
Refills: 0 | Status: DISCONTINUED | OUTPATIENT
Start: 2024-12-25 | End: 2025-01-04

## 2024-12-25 RX ORDER — OXYCODONE HCL 15 MG
5 TABLET ORAL EVERY 4 HOURS
Refills: 0 | Status: DISCONTINUED | OUTPATIENT
Start: 2024-12-25 | End: 2024-12-25

## 2024-12-25 RX ADMIN — ACETAMINOPHEN 1000 MILLIGRAM(S): 80 SOLUTION/ DROPS ORAL at 21:38

## 2024-12-25 RX ADMIN — HYDROXYCHLOROQUINE SULFATE 200 MILLIGRAM(S): 200 TABLET ORAL at 18:02

## 2024-12-25 RX ADMIN — SCOPOLAMINE 1 PATCH: 1.5 PATCH, EXTENDED RELEASE TRANSDERMAL at 18:05

## 2024-12-25 RX ADMIN — ATORVASTATIN CALCIUM 80 MILLIGRAM(S): 40 TABLET, FILM COATED ORAL at 21:38

## 2024-12-25 RX ADMIN — Medication 500 MILLIGRAM(S): at 21:39

## 2024-12-25 RX ADMIN — DILTIAZEM HYDROCHLORIDE 120 MILLIGRAM(S): 300 CAPSULE, COATED, EXTENDED RELEASE ORAL at 18:16

## 2024-12-25 NOTE — PROGRESS NOTE ADULT - ASSESSMENT
84 year old female with a pmh of HTN, CAD, HLD, CKD stage 3, Erosive osteoarthritis, asthma and anxiety who is known to our orthopedic surgery with recent admission and spinal surgery in Nov. 2024, now returns to [facility] with severe back pain and is pending OR on Monday for removal of hardware, exploration of fusion and extension of fusion to C7 (C7 to T3).  Medicine is consulted for comanagement.     # back pain  - s/p WINIFRED, Exploration of fusion, extension of fusion to T1 w/ Tethers to C7 by Dr. TONO Salguero on 12-23  - pain regimen per primary team  - ensure bowel regimen  - can offer lidocaine patch   - can offer melatonin at night    #Fecal impaction   -NG placed on OR to LIWS  -Empty rectal vault on fecal disimpaction    -NGT to be removed today as patient has return of BF with soft NT abd  -Advance diet to soft     #CAD  - no history of MI or stents  - EKG nonischemic  - continue statin     # LA enlargement  Seen on echo in last admission.  - TTE showed left atrial dilation but normal EF and no significant valvular disease  - appears euvolemic  - continue with outpatient follow up with cardiology  - monitor for orthostatic symptoms    #HTN  - on home Valsartan 160 BID: resume with hold parameters   - on diltiazem 120 mg at home - continue     #Asthma  - clear lungs, no acute exacerbation - albuterol inhaler PRN    #Erosive OA  - resume home dose of plaquenil     #CKD stage 3  - Cr stable  - avoid nephrotoxins  - monitor renal function and electrolytes    # Acute blood loss anemia  # chronic iron deficiency anemia  - Drop in H/H this am likely due to operative losses   - s/p 1 prbc; Hb 8.3 today   - trend CBC  - maintain active T&S, two large bore IVs  - transfuse for Hgb <7     SCD for DVT ppx as per ortho

## 2024-12-25 NOTE — PROGRESS NOTE ADULT - ASSESSMENT
84y Female pending OR 12/23 for WINIFRED, Exploration of fusion, extension of fusion to C7 (C7-T3). General surgery consulted after intra-op placement of OG tube after induction led to 1.3L brown gastric contents appearing fluid, the procedure had already started at the time of consult. Prior to procedure the patient had nausea/retching but no vomiting the night prior and was thought to be due possible poor food tolerance, x-ray obtained showed fecal impaction but obstructive findings on the abdominal film. Pts last meal was last night and is not on any GI motility slowing medications. PT managed non operatively with NGT and now with consistent return of bowel function.    Remove NGT  advance diet as tolerated    General Surgery Team 4c will sign off. Please reconsult with any further concerns.

## 2024-12-25 NOTE — PROGRESS NOTE ADULT - SUBJECTIVE AND OBJECTIVE BOX
INTERVAL HPI/OVERNIGHT EVENTS: No acute events    SUBJECTIVE: Pt seen and examined at bedside this am by surgery team. She is feeling well. She is passing flatus and having bowel movements. She denies any nausea, vomiting, or bloating.     MEDICATIONS  (STANDING):  atorvastatin 80 milliGRAM(s) Oral at bedtime  diltiazem    milliGRAM(s) Oral daily  DULoxetine 30 milliGRAM(s) Oral daily  hydroxychloroquine 200 milliGRAM(s) Oral two times a day  lactated ringers. 1000 milliLiter(s) (100 mL/Hr) IV Continuous <Continuous>  methocarbamol 500 milliGRAM(s) Oral every 8 hours  pantoprazole    Tablet 40 milliGRAM(s) Oral before breakfast  polyethylene glycol 3350 17 Gram(s) Oral at bedtime  senna 2 Tablet(s) Oral at bedtime    MEDICATIONS  (PRN):  albuterol    90 MICROgram(s) HFA Inhaler 2 Puff(s) Inhalation every 6 hours PRN PRN  HYDROmorphone  Injectable 0.5 milliGRAM(s) IV Push every 4 hours PRN breakthrough pain on floor  HYDROmorphone  Injectable 0.5 milliGRAM(s) IV Push every 15 minutes PRN breakthrough pain in PACU  morphine  - Injectable 1 milliGRAM(s) IV Push every 4 hours PRN Moderate Pain (4 - 6)  morphine  - Injectable 2 milliGRAM(s) IV Push every 4 hours PRN Severe Pain (7 - 10)  ondansetron   Disintegrating Tablet 4 milliGRAM(s) Oral every 6 hours PRN Nausea and/or Vomiting      Vital Signs Last 24 Hrs  T(C): 36.9 (25 Dec 2024 14:16), Max: 37.7 (24 Dec 2024 16:51)  T(F): 98.4 (25 Dec 2024 14:16), Max: 99.8 (24 Dec 2024 16:51)  HR: 91 (25 Dec 2024 14:16) (81 - 96)  BP: 162/81 (25 Dec 2024 14:16) (130/58 - 163/70)  BP(mean): --  RR: 18 (25 Dec 2024 14:16) (17 - 19)  SpO2: 97% (25 Dec 2024 14:16) (95% - 98%)    Parameters below as of 25 Dec 2024 14:16  Patient On (Oxygen Delivery Method): room air        Physical Exam:  Constitutional: A&Ox3, NAD  Respiratory: normal work of breathing  Cardiovascular: NSR, RRR  Abdomen: soft, non-tender, non distended, no rebound or guarding  NGT with minimal brown gastric contents  Legs: WWP, SCDs in place, no calf tenderness        I&O's Detail    24 Dec 2024 07:01  -  25 Dec 2024 07:00  --------------------------------------------------------  IN:  Total IN: 0 mL    OUT:    Accordian (mL): 85 mL    Nasogastric/Oral tube (mL): 1100 mL  Total OUT: 1185 mL    Total NET: -1185 mL          LABS:                        8.3    7.76  )-----------( 194      ( 25 Dec 2024 05:30 )             26.4     12-25    140  |  101  |  27[H]  ----------------------------<  78  3.4[L]   |  27  |  1.00    Ca    8.6      25 Dec 2024 05:30        Urinalysis Basic - ( 25 Dec 2024 05:30 )    Color: x / Appearance: x / SG: x / pH: x  Gluc: 78 mg/dL / Ketone: x  / Bili: x / Urobili: x   Blood: x / Protein: x / Nitrite: x   Leuk Esterase: x / RBC: x / WBC x   Sq Epi: x / Non Sq Epi: x / Bacteria: x        RADIOLOGY & ADDITIONAL STUDIES:

## 2024-12-25 NOTE — PROGRESS NOTE ADULT - SUBJECTIVE AND OBJECTIVE BOX
Patient is a 84y old  Female who presents with a chief complaint of back pain (25 Dec 2024 15:50)      INTERVAL HPI/OVERNIGHT EVENTS: offers no new complaints; Passing flatus, had small BM yesterday. Touch base with ACS to remove NG and advance diet     MEDICATIONS  (STANDING):  acetaminophen     Tablet .. 1000 milliGRAM(s) Oral every 8 hours  atorvastatin 80 milliGRAM(s) Oral at bedtime  diltiazem    milliGRAM(s) Oral daily  DULoxetine 30 milliGRAM(s) Oral daily  hydroxychloroquine 200 milliGRAM(s) Oral two times a day  lactated ringers. 1000 milliLiter(s) (100 mL/Hr) IV Continuous <Continuous>  methocarbamol 500 milliGRAM(s) Oral every 8 hours  pantoprazole    Tablet 40 milliGRAM(s) Oral before breakfast  polyethylene glycol 3350 17 Gram(s) Oral at bedtime  senna 2 Tablet(s) Oral at bedtime    MEDICATIONS  (PRN):  albuterol    90 MICROgram(s) HFA Inhaler 2 Puff(s) Inhalation every 6 hours PRN PRN  HYDROmorphone  Injectable 0.5 milliGRAM(s) IV Push every 4 hours PRN breakthrough pain on floor  HYDROmorphone  Injectable 0.5 milliGRAM(s) IV Push every 15 minutes PRN breakthrough pain in PACU  ondansetron   Disintegrating Tablet 4 milliGRAM(s) Oral every 6 hours PRN Nausea and/or Vomiting  oxyCODONE    IR 5 milliGRAM(s) Oral every 4 hours PRN Moderate Pain (4 - 6)  oxyCODONE    IR 10 milliGRAM(s) Oral every 4 hours PRN Severe Pain (7 - 10)      Vital Signs Last 24 Hrs  T(C): 36.9 (25 Dec 2024 14:16), Max: 36.9 (25 Dec 2024 14:16)  T(F): 98.4 (25 Dec 2024 14:16), Max: 98.4 (25 Dec 2024 14:16)  HR: 91 (25 Dec 2024 14:16) (81 - 96)  BP: 162/81 (25 Dec 2024 14:16) (148/72 - 163/70)  BP(mean): --  RR: 18 (25 Dec 2024 14:16) (18 - 19)  SpO2: 97% (25 Dec 2024 14:16) (96% - 98%)    Parameters below as of 25 Dec 2024 14:16  Patient On (Oxygen Delivery Method): room air        ________________________________________________  PHYSICAL EXAM:  GENERAL: NAD  HEENT: NGT in place   NECK : supple  CHEST/LUNG: Clear to auscultation bilaterally   HEART: S1 S2  regular;  ABDOMEN: Soft, Nontender, Nondistended; Bowel sounds present  EXTREMITIES: no cyanosis; no edema; no calf tenderness  SKIN: warm and dry; no rash  NERVOUS SYSTEM:  Awake and alert; Oriented  to place, person and time ; no new deficits    _________________________________________________  LABS:                        8.3    7.76  )-----------( 194      ( 25 Dec 2024 05:30 )             26.4     12-25    140  |  101  |  27[H]  ----------------------------<  78  3.4[L]   |  27  |  1.00    Ca    8.6      25 Dec 2024 05:30        Urinalysis Basic - ( 25 Dec 2024 05:30 )    Color: x / Appearance: x / SG: x / pH: x  Gluc: 78 mg/dL / Ketone: x  / Bili: x / Urobili: x   Blood: x / Protein: x / Nitrite: x   Leuk Esterase: x / RBC: x / WBC x   Sq Epi: x / Non Sq Epi: x / Bacteria: x      CAPILLARY BLOOD GLUCOSE            RADIOLOGY & ADDITIONAL TESTS:      Plan of care was discussed with patient and /or primary care giver; all questions and concerns were addressed and care was aligned with patient's wishes.

## 2024-12-26 LAB
ANION GAP SERPL CALC-SCNC: 9 MMOL/L — SIGNIFICANT CHANGE UP (ref 5–17)
BASOPHILS # BLD AUTO: 0.02 K/UL — SIGNIFICANT CHANGE UP (ref 0–0.2)
BASOPHILS NFR BLD AUTO: 0.3 % — SIGNIFICANT CHANGE UP (ref 0–2)
BUN SERPL-MCNC: 21 MG/DL — SIGNIFICANT CHANGE UP (ref 7–23)
CALCIUM SERPL-MCNC: 7.9 MG/DL — LOW (ref 8.4–10.5)
CHLORIDE SERPL-SCNC: 102 MMOL/L — SIGNIFICANT CHANGE UP (ref 96–108)
CO2 SERPL-SCNC: 30 MMOL/L — SIGNIFICANT CHANGE UP (ref 22–31)
CREAT SERPL-MCNC: 0.87 MG/DL — SIGNIFICANT CHANGE UP (ref 0.5–1.3)
EGFR: 66 ML/MIN/1.73M2 — SIGNIFICANT CHANGE UP
EOSINOPHIL # BLD AUTO: 0.23 K/UL — SIGNIFICANT CHANGE UP (ref 0–0.5)
EOSINOPHIL NFR BLD AUTO: 3.3 % — SIGNIFICANT CHANGE UP (ref 0–6)
GLUCOSE SERPL-MCNC: 91 MG/DL — SIGNIFICANT CHANGE UP (ref 70–99)
HCT VFR BLD CALC: 23.2 % — LOW (ref 34.5–45)
HGB BLD-MCNC: 7.2 G/DL — LOW (ref 11.5–15.5)
IMM GRANULOCYTES NFR BLD AUTO: 0.9 % — SIGNIFICANT CHANGE UP (ref 0–0.9)
LYMPHOCYTES # BLD AUTO: 0.95 K/UL — LOW (ref 1–3.3)
LYMPHOCYTES # BLD AUTO: 13.5 % — SIGNIFICANT CHANGE UP (ref 13–44)
MCHC RBC-ENTMCNC: 28.5 PG — SIGNIFICANT CHANGE UP (ref 27–34)
MCHC RBC-ENTMCNC: 31 G/DL — LOW (ref 32–36)
MCV RBC AUTO: 91.7 FL — SIGNIFICANT CHANGE UP (ref 80–100)
MONOCYTES # BLD AUTO: 0.85 K/UL — SIGNIFICANT CHANGE UP (ref 0–0.9)
MONOCYTES NFR BLD AUTO: 12.1 % — SIGNIFICANT CHANGE UP (ref 2–14)
NEUTROPHILS # BLD AUTO: 4.93 K/UL — SIGNIFICANT CHANGE UP (ref 1.8–7.4)
NEUTROPHILS NFR BLD AUTO: 69.9 % — SIGNIFICANT CHANGE UP (ref 43–77)
NRBC # BLD: 0 /100 WBCS — SIGNIFICANT CHANGE UP (ref 0–0)
PLATELET # BLD AUTO: 171 K/UL — SIGNIFICANT CHANGE UP (ref 150–400)
POTASSIUM SERPL-MCNC: 3.9 MMOL/L — SIGNIFICANT CHANGE UP (ref 3.5–5.3)
POTASSIUM SERPL-SCNC: 3.9 MMOL/L — SIGNIFICANT CHANGE UP (ref 3.5–5.3)
RBC # BLD: 2.53 M/UL — LOW (ref 3.8–5.2)
RBC # FLD: 14.5 % — SIGNIFICANT CHANGE UP (ref 10.3–14.5)
SODIUM SERPL-SCNC: 141 MMOL/L — SIGNIFICANT CHANGE UP (ref 135–145)
WBC # BLD: 7.04 K/UL — SIGNIFICANT CHANGE UP (ref 3.8–10.5)
WBC # FLD AUTO: 7.04 K/UL — SIGNIFICANT CHANGE UP (ref 3.8–10.5)

## 2024-12-26 PROCEDURE — 99233 SBSQ HOSP IP/OBS HIGH 50: CPT

## 2024-12-26 RX ADMIN — ACETAMINOPHEN 1000 MILLIGRAM(S): 80 SOLUTION/ DROPS ORAL at 05:16

## 2024-12-26 RX ADMIN — ATORVASTATIN CALCIUM 80 MILLIGRAM(S): 40 TABLET, FILM COATED ORAL at 21:13

## 2024-12-26 RX ADMIN — ACETAMINOPHEN 1000 MILLIGRAM(S): 80 SOLUTION/ DROPS ORAL at 14:02

## 2024-12-26 RX ADMIN — SENNOSIDES 2 TABLET(S): 8.6 TABLET, FILM COATED ORAL at 21:13

## 2024-12-26 RX ADMIN — ACETAMINOPHEN 1000 MILLIGRAM(S): 80 SOLUTION/ DROPS ORAL at 21:13

## 2024-12-26 RX ADMIN — Medication 500 MILLIGRAM(S): at 21:13

## 2024-12-26 RX ADMIN — DULOXETINE HYDROCHLORIDE 30 MILLIGRAM(S): 30 CAPSULE, DELAYED RELEASE ORAL at 11:51

## 2024-12-26 RX ADMIN — Medication 17 GRAM(S): at 21:13

## 2024-12-26 RX ADMIN — PANTOPRAZOLE 40 MILLIGRAM(S): 40 TABLET, DELAYED RELEASE ORAL at 05:22

## 2024-12-26 RX ADMIN — ACETAMINOPHEN 1000 MILLIGRAM(S): 80 SOLUTION/ DROPS ORAL at 22:00

## 2024-12-26 RX ADMIN — Medication 500 MILLIGRAM(S): at 14:02

## 2024-12-26 RX ADMIN — HYDROXYCHLOROQUINE SULFATE 200 MILLIGRAM(S): 200 TABLET ORAL at 17:38

## 2024-12-26 RX ADMIN — Medication 500 MILLIGRAM(S): at 05:16

## 2024-12-26 RX ADMIN — ACETAMINOPHEN 1000 MILLIGRAM(S): 80 SOLUTION/ DROPS ORAL at 15:00

## 2024-12-26 RX ADMIN — DILTIAZEM HYDROCHLORIDE 120 MILLIGRAM(S): 300 CAPSULE, COATED, EXTENDED RELEASE ORAL at 05:17

## 2024-12-26 RX ADMIN — HYDROXYCHLOROQUINE SULFATE 200 MILLIGRAM(S): 200 TABLET ORAL at 05:17

## 2024-12-26 NOTE — PROGRESS NOTE ADULT - SUBJECTIVE AND OBJECTIVE BOX
Patient is a 84y old  Female who presents with a chief complaint of back pain (26 Dec 2024 07:48)      INTERVAL HPI/OVERNIGHT EVENTS: offers no new complaints; current symptoms resolving    MEDICATIONS  (STANDING):  acetaminophen     Tablet .. 1000 milliGRAM(s) Oral every 8 hours  atorvastatin 80 milliGRAM(s) Oral at bedtime  diltiazem    milliGRAM(s) Oral daily  DULoxetine 30 milliGRAM(s) Oral daily  hydroxychloroquine 200 milliGRAM(s) Oral two times a day  lactated ringers. 1000 milliLiter(s) (100 mL/Hr) IV Continuous <Continuous>  methocarbamol 500 milliGRAM(s) Oral every 8 hours  pantoprazole    Tablet 40 milliGRAM(s) Oral before breakfast  polyethylene glycol 3350 17 Gram(s) Oral at bedtime  senna 2 Tablet(s) Oral at bedtime    MEDICATIONS  (PRN):  albuterol    90 MICROgram(s) HFA Inhaler 2 Puff(s) Inhalation every 6 hours PRN PRN  HYDROmorphone  Injectable 0.5 milliGRAM(s) IV Push every 4 hours PRN breakthrough pain on floor  HYDROmorphone  Injectable 0.5 milliGRAM(s) IV Push every 15 minutes PRN breakthrough pain in PACU  ondansetron   Disintegrating Tablet 4 milliGRAM(s) Oral every 6 hours PRN Nausea and/or Vomiting  oxyCODONE    IR 5 milliGRAM(s) Oral every 4 hours PRN Moderate Pain (4 - 6)  oxyCODONE    IR 10 milliGRAM(s) Oral every 4 hours PRN Severe Pain (7 - 10)      __________________________________________________  REVIEW OF SYSTEMS:    CONSTITUTIONAL: No fever,   EYES: no acute visual disturbances  NECK: No pain or stiffness  RESPIRATORY: No cough; No shortness of breath  CARDIOVASCULAR: No chest pain, no palpitations  GASTROINTESTINAL: No pain. No nausea or vomiting; No diarrhea   NEUROLOGICAL: No headache or numbness, no tremors  MUSCULOSKELETAL: No joint pain, no muscle pain  GENITOURINARY: no dysuria, no frequency, no hesitancy  PSYCHIATRY: no depression , no anxiety  ALL OTHER  ROS negative        Vital Signs Last 24 Hrs  T(C): 36.7 (26 Dec 2024 08:37), Max: 37.7 (25 Dec 2024 17:55)  T(F): 98 (26 Dec 2024 08:37), Max: 99.8 (25 Dec 2024 17:55)  HR: 84 (26 Dec 2024 08:37) (76 - 97)  BP: 150/77 (26 Dec 2024 08:37) (128/66 - 186/73)  BP(mean): 106 (26 Dec 2024 08:37) (106 - 106)  RR: 17 (26 Dec 2024 08:37) (16 - 19)  SpO2: 98% (26 Dec 2024 08:37) (96% - 100%)    Parameters below as of 26 Dec 2024 08:37  Patient On (Oxygen Delivery Method): room air        ________________________________________________  PHYSICAL EXAM:  GENERAL: NAD  HEENT: Normocephalic;  conjunctivae and sclerae clear; moist mucous membranes;   NECK : supple  CHEST/LUNG: Clear to auscultation bilaterally with good air entry   HEART: S1 S2  regular; no murmurs, gallops or rubs  ABDOMEN: Soft, Nontender, Nondistended; Bowel sounds present  EXTREMITIES: no cyanosis; no edema; no calf tenderness  SKIN: warm and dry; no rash  NERVOUS SYSTEM:  Awake and alert; Oriented  to place, person and time ; no new deficits    _________________________________________________  LABS:                        7.2    7.04  )-----------( 171      ( 26 Dec 2024 05:30 )             23.2     12-26    141  |  102  |  21  ----------------------------<  91  3.9   |  30  |  0.87    Ca    7.9[L]      26 Dec 2024 05:30        Urinalysis Basic - ( 26 Dec 2024 05:30 )    Color: x / Appearance: x / SG: x / pH: x  Gluc: 91 mg/dL / Ketone: x  / Bili: x / Urobili: x   Blood: x / Protein: x / Nitrite: x   Leuk Esterase: x / RBC: x / WBC x   Sq Epi: x / Non Sq Epi: x / Bacteria: x      CAPILLARY BLOOD GLUCOSE            RADIOLOGY & ADDITIONAL TESTS:      Plan of care was discussed with patient and /or primary care giver; all questions and concerns were addressed and care was aligned with patient's wishes.       Patient is a 84y old  Female who presents with a chief complaint of back pain (26 Dec 2024 07:48)      INTERVAL HPI/OVERNIGHT EVENTS: offers no new complaints; Had a BM this am and passing flatus     MEDICATIONS  (STANDING):  acetaminophen     Tablet .. 1000 milliGRAM(s) Oral every 8 hours  atorvastatin 80 milliGRAM(s) Oral at bedtime  diltiazem    milliGRAM(s) Oral daily  DULoxetine 30 milliGRAM(s) Oral daily  hydroxychloroquine 200 milliGRAM(s) Oral two times a day  lactated ringers. 1000 milliLiter(s) (100 mL/Hr) IV Continuous <Continuous>  methocarbamol 500 milliGRAM(s) Oral every 8 hours  pantoprazole    Tablet 40 milliGRAM(s) Oral before breakfast  polyethylene glycol 3350 17 Gram(s) Oral at bedtime  senna 2 Tablet(s) Oral at bedtime    MEDICATIONS  (PRN):  albuterol    90 MICROgram(s) HFA Inhaler 2 Puff(s) Inhalation every 6 hours PRN PRN  HYDROmorphone  Injectable 0.5 milliGRAM(s) IV Push every 4 hours PRN breakthrough pain on floor  HYDROmorphone  Injectable 0.5 milliGRAM(s) IV Push every 15 minutes PRN breakthrough pain in PACU  ondansetron   Disintegrating Tablet 4 milliGRAM(s) Oral every 6 hours PRN Nausea and/or Vomiting  oxyCODONE    IR 5 milliGRAM(s) Oral every 4 hours PRN Moderate Pain (4 - 6)  oxyCODONE    IR 10 milliGRAM(s) Oral every 4 hours PRN Severe Pain (7 - 10)      Vital Signs Last 24 Hrs  T(C): 36.7 (26 Dec 2024 08:37), Max: 37.7 (25 Dec 2024 17:55)  T(F): 98 (26 Dec 2024 08:37), Max: 99.8 (25 Dec 2024 17:55)  HR: 84 (26 Dec 2024 08:37) (76 - 97)  BP: 150/77 (26 Dec 2024 08:37) (128/66 - 186/73)  BP(mean): 106 (26 Dec 2024 08:37) (106 - 106)  RR: 17 (26 Dec 2024 08:37) (16 - 19)  SpO2: 98% (26 Dec 2024 08:37) (96% - 100%)    Parameters below as of 26 Dec 2024 08:37  Patient On (Oxygen Delivery Method): room air        ________________________________________________  PHYSICAL EXAM:  GENERAL: NAD  HEENT: moist mucous membranes;   NECK : supple  CHEST/LUNG: Clear to auscultation bilaterally  HEART: S1 S2  regular  ABDOMEN: Soft, Nontender, Nondistender  EXTREMITIES: no cyanosis; no edema  NERVOUS SYSTEM:  Awake and alert; Oriented  to place, person and time ; no new deficits    _________________________________________________  LABS:                        7.2    7.04  )-----------( 171      ( 26 Dec 2024 05:30 )             23.2     12-26    141  |  102  |  21  ----------------------------<  91  3.9   |  30  |  0.87    Ca    7.9[L]      26 Dec 2024 05:30        Urinalysis Basic - ( 26 Dec 2024 05:30 )    Color: x / Appearance: x / SG: x / pH: x  Gluc: 91 mg/dL / Ketone: x  / Bili: x / Urobili: x   Blood: x / Protein: x / Nitrite: x   Leuk Esterase: x / RBC: x / WBC x   Sq Epi: x / Non Sq Epi: x / Bacteria: x      CAPILLARY BLOOD GLUCOSE            RADIOLOGY & ADDITIONAL TESTS:      Plan of care was discussed with patient and /or primary care giver; all questions and concerns were addressed and care was aligned with patient's wishes.

## 2024-12-26 NOTE — DISCHARGE NOTE PROVIDER - HOSPITAL COURSE
Admitted to [facility] on 12/21.   OR on 12/23 for Removal of hardware, exploration of fusion, extension of fusion to T1 with tethers to C7.   Notable events:  -Episodes of vomiting overnight 12/22-12/23. Abdominal xray performed:  < from: Xray Abdomen 1 View PORTABLE -Urgent (Xray Abdomen 1 View PORTABLE -Urgent .) (12.23.24 @ 10:20) >  Findings/  impression: Colonic and rectal feces/impaction with no abnormal bowel   dilatation or pneumoperitoneum. Chilaiditi syndrome. Calcified fibroid   uterus. Levoscoliosis. Thoracic, lumbar, bilateral sacral and bilateral   iliac hardware. Bilateral hip total arthroplasties.  -General surgery consulted for high OG tube output in OR. NG tube placed.   12/24: transfuse 1u prbc due to low hemoglobin likely due to anemia secondary to blood loss from surgery.   12/25: hgb improved to 7.2, asymptomatic, continuing to monitor    Admitted to [facility] on 12/21.   OR on 12/23 for Removal of hardware, exploration of fusion, extension of fusion to T1 with tethers to C7.   Notable events:  -Episodes of vomiting overnight 12/22-12/23. Abdominal xray performed:  < from: Xray Abdomen 1 View PORTABLE -Urgent (Xray Abdomen 1 View PORTABLE -Urgent .) (12.23.24 @ 10:20) >  Findings/  impression: Colonic and rectal feces/impaction with no abnormal bowel   dilatation or pneumoperitoneum. Chilaiditi syndrome. Calcified fibroid   uterus. Levoscoliosis. Thoracic, lumbar, bilateral sacral and bilateral   iliac hardware. Bilateral hip total arthroplasties.  -General surgery consulted for high OG tube output in OR. NG tube placed.   12/24: transfuse 1u prbc due to low hemoglobin likely due to anemia secondary to blood loss from surgery.   12/25: hgb improved to 8.3, asymptomatic, continuing to monitor  12/26: Hemoglobin was lower in AM labs - 7.2 - continue to monitor   12/27. YISEL. Hgb improved to 8.1 - continue to monitor   12/28: YISEL. Hgb 7.5 continue to monitor  12/29: YISEL. Hgb 8.4 - stable  12/30: YISEL. Hgb 8.2 stable.   12/31: YISEL   1/1: YISEL  1/2: YISEL  1/3: YISEL       Admitted to [facility] on 12/21.   OR on 12/23 for Removal of hardware, exploration of fusion, extension of fusion to T1 with tethers to C7.   Notable events:  -Episodes of vomiting overnight 12/22-12/23. Abdominal xray performed:  < from: Xray Abdomen 1 View PORTABLE -Urgent (Xray Abdomen 1 View PORTABLE -Urgent .) (12.23.24 @ 10:20) >  Findings/  impression: Colonic and rectal feces/impaction with no abnormal bowel   dilatation or pneumoperitoneum. Chilaiditi syndrome. Calcified fibroid   uterus. Levoscoliosis. Thoracic, lumbar, bilateral sacral and bilateral   iliac hardware. Bilateral hip total arthroplasties.  -General surgery consulted for high OG tube output in OR. NG tube placed.   12/24: transfuse 1u prbc due to low hemoglobin likely due to anemia secondary to blood loss from surgery.   12/25: hgb improved to 8.3, asymptomatic, continuing to monitor  12/26: Hemoglobin was lower in AM labs - 7.2 - continue to monitor   12/27. YISEL. Hgb improved to 8.1 - continue to monitor   12/28: YISEL. Hgb 7.5 continue to monitor  12/29: YISEL. Hgb 8.4 - stable  12/30: YISEL. Hgb 8.2 stable.   12/31: YISEL.  1/1: YISEL.  1/2: YISEL.  1/3: YISEL.       -Admitted to [facility] on 12/21.   -OR on 12/23 for Removal of hardware, exploration of fusion, extension of fusion to T1 with tethers to C7.   Notable events:  -Episodes of vomiting overnight 12/22-12/23. Abdominal xray performed:  < from: Xray Abdomen 1 View PORTABLE -Urgent (Xray Abdomen 1 View PORTABLE -Urgent .) (12.23.24 @ 10:20) >  Findings/  impression: Colonic and rectal feces/impaction with no abnormal bowel   dilatation or pneumoperitoneum. Chilaiditi syndrome. Calcified fibroid   uterus. Levoscoliosis. Thoracic, lumbar, bilateral sacral and bilateral   iliac hardware. Bilateral hip total arthroplasties.  -General surgery consulted for high OG tube output in OR. NG tube placed.   12/24: transfuse 1u prbc due to low hemoglobin likely due to anemia secondary to blood loss from surgery.   12/25: hgb improved to 8.3, asymptomatic, continuing to monitor. NGT removed.   12/26: Hemoglobin was lower in AM labs - 7.2, asymptomatic, continue to monitor   12/27. YISEL. Hgb improved to 8.1 - continue to monitor   12/28: YISEL. Hgb 7.5, asymptomatic, continue to monitor  12/29: YISEL. Hgb 8.4 - stable  12/30: YISEL. Hgb 8.2 - stable.   12/31: YISEL.  1/1: YISEL.  1/2: YISEL.  1/3: YISEL. Optimized for discharge to rehab.

## 2024-12-26 NOTE — DISCHARGE NOTE PROVIDER - NSDCMRMEDTOKEN_GEN_ALL_CORE_FT
acetaminophen 500 mg oral tablet: 2 tab(s) orally every 8 hours  Albuterol (Eqv-ProAir HFA) 90 mcg/inh inhalation aerosol: 2 puff(s) inhaled every 6 hours as needed for PRN wheezing  aspirin 81 mg oral capsule: 1 cap(s) orally once a day  DilTIAZem (Eqv-Cardizem CD) 120 mg/24 hours oral capsule, extended release: 1 cap(s) orally 2 times a day  DULoxetine 30 mg oral delayed release capsule: 1 cap(s) orally once a day  DULoxetine 60 mg oral delayed release capsule: 1 cap(s) orally once a day  enoxaparin 40 mg/0.4 mL injectable solution: 40 milligram(s) injectable once a day for DVT prophylaxis while at rehab.  hydroxychloroquine 200 mg oral tablet: 1 tab(s) orally 2 times a day  Lipitor 80 mg oral tablet: 1 tab(s) orally once a day  methocarbamol 500 mg oral tablet: 1 tab(s) orally every 8 hours  oxyCODONE 5 mg oral tablet: 0.5 tab(s) orally every 4 hours as needed for  severe pain  pantoprazole 40 mg oral delayed release tablet: 1 tab(s) orally once a day (before a meal)  polyethylene glycol 3350 oral powder for reconstitution: 17 gram(s) orally once a day  pregabalin 50 mg oral capsule: 1 cap(s) orally 3 times a day  senna leaf extract oral tablet: 2 tab(s) orally once a day (at bedtime)  valsartan 160 mg oral tablet: 1 tab(s) orally 2 times a day   acetaminophen 500 mg oral tablet: 2 tab(s) orally every 8 hours  Albuterol (Eqv-ProAir HFA) 90 mcg/inh inhalation aerosol: 2 puff(s) inhaled every 6 hours as needed for PRN wheezing  aspirin 81 mg oral capsule: 1 cap(s) orally once a day  DilTIAZem (Eqv-Cardizem CD) 120 mg/24 hours oral capsule, extended release: 1 cap(s) orally 2 times a day  DULoxetine 30 mg oral delayed release capsule: 1 cap(s) orally once a day  enoxaparin 40 mg/0.4 mL injectable solution: 40 milligram(s) injectable once a day for DVT prophylaxis while at rehab.  hydroxychloroquine 200 mg oral tablet: 1 tab(s) orally 2 times a day  Lipitor 80 mg oral tablet: 1 tab(s) orally once a day  loperamide 2 mg oral capsule: 1 cap(s) orally every 12 hours As needed Diarrhea  methocarbamol 500 mg oral tablet: 1 tab(s) orally every 8 hours  oxyCODONE 5 mg oral tablet: 0.5 tab(s) orally every 4 hours as needed for  severe pain  pantoprazole 40 mg oral delayed release tablet: 1 tab(s) orally once a day (before a meal)  polyethylene glycol 3350 oral powder for reconstitution: 17 gram(s) orally once a day  pregabalin 50 mg oral capsule: 1 cap(s) orally 3 times a day  senna leaf extract oral tablet: 2 tab(s) orally once a day (at bedtime)  simethicone 80 mg oral tablet, chewable: 1 tab(s) orally 3 times a day As needed Gas  valsartan 160 mg oral tablet: 1 tab(s) orally 2 times a day   acetaminophen 500 mg oral tablet: 2 tab(s) orally every 8 hours  Albuterol (Eqv-ProAir HFA) 90 mcg/inh inhalation aerosol: 2 puff(s) inhaled every 6 hours as needed for PRN wheezing  aspirin 81 mg oral capsule: 1 cap(s) orally once a day  DilTIAZem (Eqv-Cardizem CD) 120 mg/24 hours oral capsule, extended release: 1 cap(s) orally 2 times a day  DULoxetine 30 mg oral delayed release capsule: 1 cap(s) orally once a day  enoxaparin 40 mg/0.4 mL injectable solution: 40 milligram(s) injectable once a day for DVT prophylaxis while at rehab.  hydroxychloroquine 200 mg oral tablet: 1 tab(s) orally 2 times a day  Lipitor 80 mg oral tablet: 1 tab(s) orally once a day  loperamide 2 mg oral capsule: 1 cap(s) orally every 12 hours As needed Diarrhea  methocarbamol 500 mg oral tablet: 1 tab(s) orally every 8 hours  oxyCODONE 5 mg oral tablet: 0.5 tab(s) orally every 4 hours as needed for  severe pain  pantoprazole 40 mg oral delayed release tablet: 1 tab(s) orally once a day (before a meal)  polyethylene glycol 3350 oral powder for reconstitution: 17 gram(s) orally once a day  pregabalin 50 mg oral capsule: 1 cap(s) orally 3 times a day  senna leaf extract oral tablet: 2 tab(s) orally once a day (at bedtime)  simethicone 80 mg oral tablet, chewable: 1 tab(s) orally 3 times a day As needed Gas  traZODone: 25 milligram(s) orally once a day (at bedtime) as needed for  insomnia  valsartan 160 mg oral tablet: 1 tab(s) orally 2 times a day   acetaminophen 500 mg oral tablet: 2 tab(s) orally every 8 hours  Albuterol (Eqv-ProAir HFA) 90 mcg/inh inhalation aerosol: 2 puff(s) inhaled every 6 hours as needed for PRN wheezing  aspirin 81 mg oral capsule: 1 cap(s) orally once a day  DilTIAZem (Eqv-Cardizem CD) 120 mg/24 hours oral capsule, extended release: 1 cap(s) orally 2 times a day  DULoxetine 30 mg oral delayed release capsule: 1 cap(s) orally once a day  hydroxychloroquine 200 mg oral tablet: 1 tab(s) orally 2 times a day  Lipitor 80 mg oral tablet: 1 tab(s) orally once a day  loperamide 2 mg oral capsule: 1 cap(s) orally every 12 hours As needed Diarrhea  methocarbamol 500 mg oral tablet: 1 tab(s) orally every 8 hours  pantoprazole 40 mg oral delayed release tablet: 1 tab(s) orally once a day (before a meal)  polyethylene glycol 3350 oral powder for reconstitution: 17 gram(s) orally once a day  pregabalin 50 mg oral capsule: 1 cap(s) orally 3 times a day  senna leaf extract oral tablet: 2 tab(s) orally once a day (at bedtime)  simethicone 80 mg oral tablet, chewable: 1 tab(s) orally 3 times a day As needed Gas  traZODone: 25 milligram(s) orally once a day (at bedtime) as needed for  insomnia  valsartan 160 mg oral tablet: 1 tab(s) orally 2 times a day

## 2024-12-26 NOTE — DISCHARGE NOTE PROVIDER - NSDCCPTREATMENT_GEN_ALL_CORE_FT
PRINCIPAL PROCEDURE  Procedure: Fusion, posterior spinal column, lumbar, 3 levels, posterior approach  Findings and Treatment: Extension of prior fusion to T1

## 2024-12-26 NOTE — DISCHARGE NOTE PROVIDER - NSDCFUSCHEDAPPT_GEN_ALL_CORE_FT
[facility]  ORTHOSURG [address]  Scheduled Appointment: 01/14/2025    [facility]  ORTHOSURROSE [address]  Scheduled Appointment: 03/11/2025

## 2024-12-26 NOTE — DISCHARGE NOTE PROVIDER - NSDCFUADDINST_GEN_ALL_CORE_FT
No strenuous activity (bending/twisting), heavy lifting, driving or returning to work until cleared by MD.    DRESSING/SHOWERING: _________________    MEDICATION/ANTICOAGULATION:   - You have been prescribed medications for pain:      - Tylenol for mild to moderate pain. Do not exceed 3,000mg daily.     - For more severe pain, take Tylenol with the addition of narcotic pain medication. Take this medication as prescribed. This medication may cause drowsiness or dizziness. Do not operate machinery. This medication may cause constipation.   - For any additional medications, follow instructions on the bottle.    -Try to have regular bowel movements. Take stool softener or laxative if necessary. You may wish to take Miralax daily until you have regular bowel movements.    - If you have a pain management physician, please follow-up with them postoperatively.    - If you experience any negative side effects of your medications, please call your surgeon's office to discuss.     FOLLOW-UP:   - Call to schedule an appt with Dr. Salguero for follow up.  - Please follow-up with your primary care physician or any other specialist you see postoperatively, if needed.    - Contact your doctor or go to the emergency room if you experience: fever greater than 101.5, chills, chest pain, difficulty breathing, redness or excessive drainage around the incision, other concerns.  No strenuous activity (bending/twisting), heavy lifting, driving or returning to work until cleared by MD.    DRESSING/SHOWERING: _________________    MEDICATION/ANTICOAGULATION:   - You have been prescribed medications for pain:      - Tylenol for mild to moderate pain. Do not exceed 3,000mg daily.     - For more severe pain, take Tylenol with the addition of narcotic pain medication. Take this medication as prescribed. This medication may cause drowsiness or dizziness. Do not operate machinery. This medication may cause constipation.   - For any additional medications, follow instructions on the bottle.    -Try to have regular bowel movements. Take stool softener or laxative if necessary. You may wish to take Miralax daily until you have regular bowel movements.    - If you have a pain management physician, please follow-up with them postoperatively.    - If you experience any negative side effects of your medications, please call your surgeon's office to discuss.     FOLLOW-UP:   - Call to schedule an appt with Dr. Salguero for follow up.  - Please follow-up with your primary care physician or any other specialist you see postoperatively, if needed.    - Follow up with your cardiologist outpatient for left atrial enlargement  - Contact your doctor or go to the emergency room if you experience: fever greater than 101.5, chills, chest pain, difficulty breathing, redness or excessive drainage around the incision, other concerns.  No strenuous activity (bending/twisting), heavy lifting, driving or returning to work until cleared by MD.    DRESSING/SHOWERING:  (AQUACEL – brown gel dressing)   - Your dressing was changed from gauze and paper tape to Aquacel prior to leaving for rehab. You may shower, your dressing is water-resistant. Do not soak in bathtubs. You may leave the dressing in place when arriving at rehab to keep incision dry and intact, you may remove dressing and then leave incision open to air after arriving at rehab. You may do this yourself (simply peel it off), or you may ask for assistance from your visiting nurse. Keep your incision clean and dry. Do not pick at your incision. Do not apply creams, ointments or oils to your incision until cleared by your surgeon. Do not soak your incision in sitting water (ie tubs, pools, lakes, etc.) until cleared by your surgeon. Do not scrub the incision – instead, allow soap and water to flow over the incision and then pat it dry with a clean towel.       MEDICATION/ANTICOAGULATION:   - You have been prescribed medications for pain:      - Tylenol for mild to moderate pain. Do not exceed 3,000mg daily.     - For more severe pain, take Tylenol with the addition of narcotic pain medication. Take this medication as prescribed. This medication may cause drowsiness or dizziness. Do not operate machinery. This medication may cause constipation.   - For any additional medications, follow instructions on the bottle.    -Try to have regular bowel movements. Take stool softener or laxative if necessary. You may wish to take Miralax daily until you have regular bowel movements.    - If you have a pain management physician, please follow-up with them postoperatively.    - If you experience any negative side effects of your medications, please call your surgeon's office to discuss.     FOLLOW-UP:   - Call to schedule an appt with Dr. Salguero for follow up.  - Please follow-up with your primary care physician or any other specialist you see postoperatively, if needed.    - Follow up with your cardiologist outpatient for left atrial enlargement  - Contact your doctor or go to the emergency room if you experience: fever greater than 101.5, chills, chest pain, difficulty breathing, redness or excessive drainage around the incision, other concerns.  No strenuous activity (bending/twisting), heavy lifting, driving or returning to work until cleared by MD.    DRESSING/SHOWERING:  (AQUACEL – brown gel dressing)   - Your dressing was changed from gauze and paper tape to Aquacel prior to leaving for rehab. You may shower, your dressing is water-resistant. Do not soak in bathtubs. You may leave the dressing in place when arriving at rehab to keep incision dry and intact, you may remove dressing and then leave incision open to air after arriving at rehab. You may do this yourself (simply peel it off), or you may ask for assistance from your visiting nurse. Keep your incision clean and dry. Do not pick at your incision. Do not apply creams, ointments or oils to your incision until cleared by your surgeon. Do not soak your incision in sitting water (ie tubs, pools, lakes, etc.) until cleared by your surgeon. Do not scrub the incision – instead, allow soap and water to flow over the incision and then pat it dry with a clean towel.       MEDICATION/ANTICOAGULATION:   - You have been prescribed medications for pain:      - Tylenol for mild to moderate pain. Do not exceed 3,000mg daily.     - For more severe pain, take Tylenol with the addition of narcotic pain medication. Take this medication as prescribed. This medication may cause drowsiness or dizziness. Do not operate machinery. This medication may cause constipation.   - For any additional medications, follow instructions on the bottle.    -Try to have regular bowel movements. Take stool softener or laxative if necessary. You may wish to take Miralax daily until you have regular bowel movements.    - If you have a pain management physician, please follow-up with them postoperatively.    - If you experience any negative side effects of your medications, please call your surgeon's office to discuss.     FOLLOW-UP:   - Call to schedule an appt with Dr. Salguero for follow up.  - Please follow-up with your primary care physician or any other specialist you see postoperatively, if needed.    - Follow up with your cardiologist outpatient for left atrial enlargement (normal EF) seen on echo performed in left admission   - Contact your doctor or go to the emergency room if you experience: fever greater than 101.5, chills, chest pain, difficulty breathing, redness or excessive drainage around the incision, other concerns.  No strenuous activity (bending/twisting), heavy lifting, driving or returning to work until cleared by MD.    DRESSING/SHOWERING:  (AQUACEL – brown gel dressing)   - Your dressing was changed from gauze and paper tape to Aquacel prior to leaving for rehab. You may shower, your dressing is water-resistant. Do not soak in bathtubs. May remove in 5-7 days and then leave incision open to air after arriving at rehab. You may do this yourself (simply peel it off), or you may ask for assistance from your visiting nurse. Keep your incision clean and dry. Do not pick at your incision. Do not apply creams, ointments or oils to your incision until cleared by your surgeon. Do not soak your incision in sitting water (ie tubs, pools, lakes, etc.) until cleared by your surgeon. Do not scrub the incision – instead, allow soap and water to flow over the incision and then pat it dry with a clean towel.       MEDICATION/ANTICOAGULATION:   - You have been prescribed medications for pain:      - Tylenol for mild to moderate pain. Do not exceed 3,000mg daily.     - For more severe pain, take Tylenol with the addition of narcotic pain medication. Take this medication as prescribed. This medication may cause drowsiness or dizziness. Do not operate machinery. This medication may cause constipation.   - For any additional medications, follow instructions on the bottle.    -Try to have regular bowel movements. Take stool softener or laxative if necessary. You may wish to take Miralax daily until you have regular bowel movements.    - If you have a pain management physician, please follow-up with them postoperatively.    - If you experience any negative side effects of your medications, please call your surgeon's office to discuss.     FOLLOW-UP:   - Call to schedule an appt with Dr. Salguero for follow up.  - Please follow-up with your primary care physician or any other specialist you see postoperatively, if needed.    - Follow up with your cardiologist outpatient for left atrial enlargement (normal EF) seen on echo performed in left admission   - Contact your doctor or go to the emergency room if you experience: fever greater than 101.5, chills, chest pain, difficulty breathing, redness or excessive drainage around the incision, other concerns.

## 2024-12-26 NOTE — PROGRESS NOTE ADULT - ASSESSMENT
84 year old female with a pmh of HTN, CAD, HLD, CKD stage 3, Erosive osteoarthritis, asthma and anxiety who is known to our orthopedic surgery with recent admission and spinal surgery in Nov. 2024, now returns to [facility] with severe back pain and is pending OR on Monday for removal of hardware, exploration of fusion and extension of fusion to C7 (C7 to T3).  Medicine is consulted for comanagement.     # back pain  - s/p WINIFRED, Exploration of fusion, extension of fusion to T1 w/ Tethers to C7 by Dr. 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  - TTE showed left atrial dilation but normal EF and no significant valvular disease  - appears euvolemic  - continue with outpatient follow up with cardiology  - monitor for orthostatic symptoms    #HTN  - on home Valsartan 160 BID: resume with hold parameters   - on diltiazem 120 mg at home - continue     #Asthma  - clear lungs, no acute exacerbation - albuterol inhaler PRN    #Erosive OA  - resume home dose of plaquenil     #CKD stage 3  - Cr stable  - avoid nephrotoxins  - monitor renal function and electrolytes    # Acute blood loss anemia  # chronic iron deficiency anemia  - Drop in H/H this am likely due to operative losses   - s/p 1 prbc; Hb 7.2 today ; will continue to monitor   - trend CBC  - maintain active T&S, two large bore IVs  - transfuse for Hgb <7     SCD for DVT ppx as per ortho

## 2024-12-26 NOTE — PROGRESS NOTE ADULT - SUBJECTIVE AND OBJECTIVE BOX
24HR EVENTS:     SUBJECTIVE: Pt seen and examined on morning rounds. Pain well controlled. Patient denies CP/SOB/N/V. Urinating without issue.      Vital Signs Last 24 Hrs  T(C): 36.5 (26 Dec 2024 05:15), Max: 37.7 (25 Dec 2024 17:55)  T(F): 97.7 (26 Dec 2024 05:15), Max: 99.8 (25 Dec 2024 17:55)  HR: 76 (26 Dec 2024 05:15) (76 - 97)  BP: 151/67 (26 Dec 2024 05:15) (128/66 - 186/73)  BP(mean): --  RR: 19 (26 Dec 2024 05:15) (16 - 19)  SpO2: 96% (26 Dec 2024 05:15) (96% - 100%)    Parameters below as of 26 Dec 2024 05:15  Patient On (Oxygen Delivery Method): room air        Physical Exam:  General: NAD, resting comfortably in bed    RLE:  SILT L2-S1, symmetric  Motor 5/5 L2-S1, symmetric  Pulses 2+    LLE:  SILT L2-S1, symmetric  Motor 5/5 L2-S1, symmetric  Pulses 2+      Assessment/Plan:  84yF s/p WINIFRED, Exploration of fusion, extension of fusion to T1 w/ Tethers to C7 by Dr. TONO Salguero on 12-23  - Weight Bearing Status: WBAT  - Pain control  - DVT PPx: SCDs  - PT rec: AR  - F/u AM labs  - Dispo: AR  - NG removed, advanced to regular diet  - Continue drain    Jacob Lopez, PGY-1  Orthopedic Surgery

## 2024-12-26 NOTE — DISCHARGE NOTE PROVIDER - NSDCCPCAREPLAN_GEN_ALL_CORE_FT
PRINCIPAL DISCHARGE DIAGNOSIS  Diagnosis: Spinal stenosis  Assessment and Plan of Treatment:       SECONDARY DISCHARGE DIAGNOSES  Diagnosis: Spinal stenosis  Assessment and Plan of Treatment:

## 2024-12-26 NOTE — DISCHARGE NOTE PROVIDER - CARE PROVIDER_API CALL
Robi Salguero  Spine Surgery  [address]  [place] [address]  Phone: [phone]  Fax: [phone]  Follow Up Time:

## 2024-12-27 LAB
ANION GAP SERPL CALC-SCNC: 9 MMOL/L — SIGNIFICANT CHANGE UP (ref 5–17)
BUN SERPL-MCNC: 15 MG/DL — SIGNIFICANT CHANGE UP (ref 7–23)
CALCIUM SERPL-MCNC: 8.5 MG/DL — SIGNIFICANT CHANGE UP (ref 8.4–10.5)
CHLORIDE SERPL-SCNC: 101 MMOL/L — SIGNIFICANT CHANGE UP (ref 96–108)
CO2 SERPL-SCNC: 34 MMOL/L — HIGH (ref 22–31)
CREAT SERPL-MCNC: 0.92 MG/DL — SIGNIFICANT CHANGE UP (ref 0.5–1.3)
EGFR: 62 ML/MIN/1.73M2 — SIGNIFICANT CHANGE UP
GLUCOSE SERPL-MCNC: 116 MG/DL — HIGH (ref 70–99)
HCT VFR BLD CALC: 26.4 % — LOW (ref 34.5–45)
HGB BLD-MCNC: 8.1 G/DL — LOW (ref 11.5–15.5)
MCHC RBC-ENTMCNC: 27.9 PG — SIGNIFICANT CHANGE UP (ref 27–34)
MCHC RBC-ENTMCNC: 30.7 G/DL — LOW (ref 32–36)
MCV RBC AUTO: 91 FL — SIGNIFICANT CHANGE UP (ref 80–100)
NRBC # BLD: 0 /100 WBCS — SIGNIFICANT CHANGE UP (ref 0–0)
PLATELET # BLD AUTO: 222 K/UL — SIGNIFICANT CHANGE UP (ref 150–400)
POTASSIUM SERPL-MCNC: 3.5 MMOL/L — SIGNIFICANT CHANGE UP (ref 3.5–5.3)
POTASSIUM SERPL-SCNC: 3.5 MMOL/L — SIGNIFICANT CHANGE UP (ref 3.5–5.3)
RBC # BLD: 2.9 M/UL — LOW (ref 3.8–5.2)
RBC # FLD: 14 % — SIGNIFICANT CHANGE UP (ref 10.3–14.5)
SODIUM SERPL-SCNC: 144 MMOL/L — SIGNIFICANT CHANGE UP (ref 135–145)
WBC # BLD: 7.59 K/UL — SIGNIFICANT CHANGE UP (ref 3.8–10.5)
WBC # FLD AUTO: 7.59 K/UL — SIGNIFICANT CHANGE UP (ref 3.8–10.5)

## 2024-12-27 PROCEDURE — 99233 SBSQ HOSP IP/OBS HIGH 50: CPT

## 2024-12-27 PROCEDURE — 74018 RADEX ABDOMEN 1 VIEW: CPT | Mod: 26

## 2024-12-27 RX ORDER — SIMETHICONE 125 MG/1
80 CAPSULE, LIQUID FILLED ORAL THREE TIMES A DAY
Refills: 0 | Status: DISCONTINUED | OUTPATIENT
Start: 2024-12-27 | End: 2025-01-04

## 2024-12-27 RX ADMIN — ATORVASTATIN CALCIUM 80 MILLIGRAM(S): 40 TABLET, FILM COATED ORAL at 21:57

## 2024-12-27 RX ADMIN — ACETAMINOPHEN 1000 MILLIGRAM(S): 80 SOLUTION/ DROPS ORAL at 05:07

## 2024-12-27 RX ADMIN — Medication 500 MILLIGRAM(S): at 21:57

## 2024-12-27 RX ADMIN — Medication 500 MILLIGRAM(S): at 05:07

## 2024-12-27 RX ADMIN — DILTIAZEM HYDROCHLORIDE 120 MILLIGRAM(S): 300 CAPSULE, COATED, EXTENDED RELEASE ORAL at 05:07

## 2024-12-27 RX ADMIN — ACETAMINOPHEN 1000 MILLIGRAM(S): 80 SOLUTION/ DROPS ORAL at 21:57

## 2024-12-27 RX ADMIN — HYDROXYCHLOROQUINE SULFATE 200 MILLIGRAM(S): 200 TABLET ORAL at 17:45

## 2024-12-27 RX ADMIN — HYDROXYCHLOROQUINE SULFATE 200 MILLIGRAM(S): 200 TABLET ORAL at 05:07

## 2024-12-27 RX ADMIN — Medication 500 MILLIGRAM(S): at 13:40

## 2024-12-27 RX ADMIN — DULOXETINE HYDROCHLORIDE 30 MILLIGRAM(S): 30 CAPSULE, DELAYED RELEASE ORAL at 13:39

## 2024-12-27 RX ADMIN — PANTOPRAZOLE 40 MILLIGRAM(S): 40 TABLET, DELAYED RELEASE ORAL at 05:07

## 2024-12-27 RX ADMIN — ONDANSETRON 4 MILLIGRAM(S): 4 TABLET ORAL at 06:08

## 2024-12-27 RX ADMIN — ACETAMINOPHEN 1000 MILLIGRAM(S): 80 SOLUTION/ DROPS ORAL at 13:39

## 2024-12-27 RX ADMIN — ONDANSETRON 4 MILLIGRAM(S): 4 TABLET ORAL at 00:24

## 2024-12-27 RX ADMIN — SIMETHICONE 80 MILLIGRAM(S): 125 CAPSULE, LIQUID FILLED ORAL at 06:45

## 2024-12-27 NOTE — PROGRESS NOTE ADULT - SUBJECTIVE AND OBJECTIVE BOX
Patient is a 84y old  Female who presents with a chief complaint of back pain (27 Dec 2024 06:31)      INTERVAL HPI/OVERNIGHT EVENTS: offers no new complaints; current symptoms resolving    MEDICATIONS  (STANDING):  acetaminophen     Tablet .. 1000 milliGRAM(s) Oral every 8 hours  atorvastatin 80 milliGRAM(s) Oral at bedtime  diltiazem    milliGRAM(s) Oral daily  DULoxetine 30 milliGRAM(s) Oral daily  hydroxychloroquine 200 milliGRAM(s) Oral two times a day  lactated ringers. 1000 milliLiter(s) (100 mL/Hr) IV Continuous <Continuous>  methocarbamol 500 milliGRAM(s) Oral every 8 hours  pantoprazole    Tablet 40 milliGRAM(s) Oral before breakfast  polyethylene glycol 3350 17 Gram(s) Oral at bedtime  senna 2 Tablet(s) Oral at bedtime    MEDICATIONS  (PRN):  albuterol    90 MICROgram(s) HFA Inhaler 2 Puff(s) Inhalation every 6 hours PRN PRN  HYDROmorphone  Injectable 0.5 milliGRAM(s) IV Push every 4 hours PRN breakthrough pain on floor  HYDROmorphone  Injectable 0.5 milliGRAM(s) IV Push every 15 minutes PRN breakthrough pain in PACU  ondansetron   Disintegrating Tablet 4 milliGRAM(s) Oral every 6 hours PRN Nausea and/or Vomiting  oxyCODONE    IR 5 milliGRAM(s) Oral every 4 hours PRN Moderate Pain (4 - 6)  oxyCODONE    IR 10 milliGRAM(s) Oral every 4 hours PRN Severe Pain (7 - 10)  simethicone 80 milliGRAM(s) Chew three times a day PRN Gas      __________________________________________________  REVIEW OF SYSTEMS:    CONSTITUTIONAL: No fever,   EYES: no acute visual disturbances  NECK: No pain or stiffness  RESPIRATORY: No cough; No shortness of breath  CARDIOVASCULAR: No chest pain, no palpitations  GASTROINTESTINAL: No pain. No nausea or vomiting; No diarrhea   NEUROLOGICAL: No headache or numbness, no tremors  MUSCULOSKELETAL: No joint pain, no muscle pain  GENITOURINARY: no dysuria, no frequency, no hesitancy  PSYCHIATRY: no depression , no anxiety  ALL OTHER  ROS negative        Vital Signs Last 24 Hrs  T(C): 36.8 (27 Dec 2024 08:35), Max: 36.8 (26 Dec 2024 14:05)  T(F): 98.2 (27 Dec 2024 08:35), Max: 98.2 (26 Dec 2024 14:05)  HR: 79 (27 Dec 2024 08:35) (75 - 86)  BP: 154/81 (27 Dec 2024 08:35) (136/64 - 167/66)  BP(mean): 92 (26 Dec 2024 14:05) (92 - 92)  RR: 18 (27 Dec 2024 08:35) (16 - 20)  SpO2: 97% (27 Dec 2024 08:35) (93% - 100%)    Parameters below as of 27 Dec 2024 08:35  Patient On (Oxygen Delivery Method): room air        ________________________________________________  PHYSICAL EXAM:  GENERAL: NAD  HEENT: Normocephalic;  conjunctivae and sclerae clear; moist mucous membranes;   NECK : supple  CHEST/LUNG: Clear to auscultation bilaterally with good air entry   HEART: S1 S2  regular; no murmurs, gallops or rubs  ABDOMEN: Soft, Nontender, Nondistended; Bowel sounds present  EXTREMITIES: no cyanosis; no edema; no calf tenderness  SKIN: warm and dry; no rash  NERVOUS SYSTEM:  Awake and alert; Oriented  to place, person and time ; no new deficits    _________________________________________________  LABS:                        8.1    7.59  )-----------( 222      ( 27 Dec 2024 05:30 )             26.4     12-27    144  |  101  |  15  ----------------------------<  116[H]  3.5   |  34[H]  |  0.92    Ca    8.5      27 Dec 2024 05:30        Urinalysis Basic - ( 27 Dec 2024 05:30 )    Color: x / Appearance: x / SG: x / pH: x  Gluc: 116 mg/dL / Ketone: x  / Bili: x / Urobili: x   Blood: x / Protein: x / Nitrite: x   Leuk Esterase: x / RBC: x / WBC x   Sq Epi: x / Non Sq Epi: x / Bacteria: x      CAPILLARY BLOOD GLUCOSE            RADIOLOGY & ADDITIONAL TESTS:      Plan of care was discussed with patient and /or primary care giver; all questions and concerns were addressed and care was aligned with patient's wishes.       Patient is a 84y old  Female who presents with a chief complaint of back pain (27 Dec 2024 06:31)      INTERVAL HPI/OVERNIGHT EVENTS: Patient reported abd distension and nausea. Had 2 BMs today and is passing gas. Denies abd pain. Made NPO and xray abd ordered     MEDICATIONS  (STANDING):  acetaminophen     Tablet .. 1000 milliGRAM(s) Oral every 8 hours  atorvastatin 80 milliGRAM(s) Oral at bedtime  diltiazem    milliGRAM(s) Oral daily  DULoxetine 30 milliGRAM(s) Oral daily  hydroxychloroquine 200 milliGRAM(s) Oral two times a day  lactated ringers. 1000 milliLiter(s) (100 mL/Hr) IV Continuous <Continuous>  methocarbamol 500 milliGRAM(s) Oral every 8 hours  pantoprazole    Tablet 40 milliGRAM(s) Oral before breakfast  polyethylene glycol 3350 17 Gram(s) Oral at bedtime  senna 2 Tablet(s) Oral at bedtime    MEDICATIONS  (PRN):  albuterol    90 MICROgram(s) HFA Inhaler 2 Puff(s) Inhalation every 6 hours PRN PRN  HYDROmorphone  Injectable 0.5 milliGRAM(s) IV Push every 4 hours PRN breakthrough pain on floor  HYDROmorphone  Injectable 0.5 milliGRAM(s) IV Push every 15 minutes PRN breakthrough pain in PACU  ondansetron   Disintegrating Tablet 4 milliGRAM(s) Oral every 6 hours PRN Nausea and/or Vomiting  oxyCODONE    IR 5 milliGRAM(s) Oral every 4 hours PRN Moderate Pain (4 - 6)  oxyCODONE    IR 10 milliGRAM(s) Oral every 4 hours PRN Severe Pain (7 - 10)  simethicone 80 milliGRAM(s) Chew three times a day PRN Gas          Vital Signs Last 24 Hrs  T(C): 36.8 (27 Dec 2024 08:35), Max: 36.8 (26 Dec 2024 14:05)  T(F): 98.2 (27 Dec 2024 08:35), Max: 98.2 (26 Dec 2024 14:05)  HR: 79 (27 Dec 2024 08:35) (75 - 86)  BP: 154/81 (27 Dec 2024 08:35) (136/64 - 167/66)  BP(mean): 92 (26 Dec 2024 14:05) (92 - 92)  RR: 18 (27 Dec 2024 08:35) (16 - 20)  SpO2: 97% (27 Dec 2024 08:35) (93% - 100%)    Parameters below as of 27 Dec 2024 08:35  Patient On (Oxygen Delivery Method): room air        ________________________________________________  PHYSICAL EXAM:  GENERAL: NAD  HEENT: Normocephalic;  conjunctivae and sclerae clear; moist mucous membranes;   NECK : supple  CHEST/LUNG: Clear to auscultation bilaterally with good air entry   HEART: S1 S2  regular; no murmurs, gallops or rubs  ABDOMEN: Soft, Nontender, distended; Bowel sounds present  EXTREMITIES: no cyanosis; no edema; no calf tenderness  SKIN: warm and dry; no rash  NERVOUS SYSTEM:  Awake and alert; Oriented  to place, person and time ; no new deficits    _________________________________________________  LABS:                        8.1    7.59  )-----------( 222      ( 27 Dec 2024 05:30 )             26.4     12-27    144  |  101  |  15  ----------------------------<  116[H]  3.5   |  34[H]  |  0.92    Ca    8.5      27 Dec 2024 05:30        Urinalysis Basic - ( 27 Dec 2024 05:30 )    Color: x / Appearance: x / SG: x / pH: x  Gluc: 116 mg/dL / Ketone: x  / Bili: x / Urobili: x   Blood: x / Protein: x / Nitrite: x   Leuk Esterase: x / RBC: x / WBC x   Sq Epi: x / Non Sq Epi: x / Bacteria: x      CAPILLARY BLOOD GLUCOSE            RADIOLOGY & ADDITIONAL TESTS:      Plan of care was discussed with patient and /or primary care giver; all questions and concerns were addressed and care was aligned with patient's wishes.

## 2024-12-27 NOTE — PROGRESS NOTE ADULT - SUBJECTIVE AND OBJECTIVE BOX
Orthopaedic Surgery Progress Note    Patient seen and examined. States she is a bit nauseous again today, no vomiting. Passing gas, having BMs.     Objective:    Vital Signs Last 24 Hrs  T(C): 36.8 (12-27-24 @ 08:35), Max: 36.8 (12-27-24 @ 08:35)  T(F): 98.2 (12-27-24 @ 08:35), Max: 98.2 (12-27-24 @ 08:35)  HR: 79 (12-27-24 @ 08:35) (79 - 79)  BP: 154/81 (12-27-24 @ 08:35) (154/81 - 154/81)  BP(mean): --  RR: 18 (12-27-24 @ 08:35) (18 - 18)  SpO2: 97% (12-27-24 @ 08:35) (97% - 97%)  AVSS    PE:  General: Patient alert and oriented, NAD  Dressing: Clean/dry/intact back abd/paper tape, hemovac x 1 holding suction   Abd: somewhat distended compared to yesterday, non-tender                         8.1    7.59  )-----------( 222      ( 27 Dec 2024 05:30 )             26.4   12-27    144  |  101  |  15  ----------------------------<  116[H]  3.5   |  34[H]  |  0.92    Ca    8.5      27 Dec 2024 05:30      A/P: 84yFemale status post removal of hardware, exploration of fusion, extension of fusion to T1 with tethers to C7 on 12/23  -seen by general surgery during admission due to high OG tube output in OR, had NGT initially postop but as since been removed  -had been advanced to regular diet which she was tolerating although today given increased nausea and slight abd  distention, discussed with medicine co-management and got abdominal xray. Got wet read from radiology - per radiology no bowel obstruction. Per medicine ok to continue regular diet   -continue hemovac drain management  -Pain control as needed  -WBAT  -Plan for acute rehab once medically optimized

## 2024-12-27 NOTE — PROGRESS NOTE ADULT - ASSESSMENT
84 year old female with a pmh of HTN, CAD, HLD, CKD stage 3, Erosive osteoarthritis, asthma and anxiety who is known to our orthopedic surgery with recent admission and spinal surgery in Nov. 2024, now returns to [facility] with severe back pain and is pending OR on Monday for removal of hardware, exploration of fusion and extension of fusion to C7 (C7 to T3).  Medicine is consulted for comanagement.     # back pain  - s/p WINIFRED, Exploration of fusion, extension of fusion to T1 w/ Tethers to C7 by Dr. TONO Salguero on 12-23  - pain regimen per primary team  - ensure bowel regimen  - can offer lidocaine patch   - can offer melatonin at night    #Fecal impaction   -NG placed on OR to LIWS  -Empty rectal vault on fecal disimpaction    -NGT removed 12/25 12/27: abd distended but having BMs and passing flatus. Xray abd ordered, informed no SBO on prelim read.   Diet advanced as tolerated   Call gen surg to evaluate if vomiting/worsening distention or abd pain develops.  - Gen surg following     #CAD  - no history of MI or stents  - EKG nonischemic  - continue statin     # LA enlargement  Seen on echo in last admission.  - TTE showed left atrial dilation but normal EF and no significant valvular disease  - appears euvolemic  - continue with outpatient follow up with cardiology  - monitor for orthostatic symptoms    #HTN  - on home Valsartan 160 BID: resume with hold parameters   - on diltiazem 120 mg at home - continue     #Asthma  - clear lungs, no acute exacerbation - albuterol inhaler PRN    #Erosive OA  - resume home dose of plaquenil     #CKD stage 3  - Cr stable  - avoid nephrotoxins  - monitor renal function and electrolytes    # Acute blood loss anemia  # chronic iron deficiency anemia  - Drop in H/H this am likely due to operative losses   - s/p 1 prbc; Hb 7.2 today ; will continue to monitor   - trend CBC  - maintain active T&S, two large bore IVs  - transfuse for Hgb <7     SCD for DVT ppx as per ortho

## 2024-12-28 LAB
ANION GAP SERPL CALC-SCNC: 7 MMOL/L — SIGNIFICANT CHANGE UP (ref 5–17)
BUN SERPL-MCNC: 11 MG/DL — SIGNIFICANT CHANGE UP (ref 7–23)
CALCIUM SERPL-MCNC: 8 MG/DL — LOW (ref 8.4–10.5)
CHLORIDE SERPL-SCNC: 106 MMOL/L — SIGNIFICANT CHANGE UP (ref 96–108)
CO2 SERPL-SCNC: 27 MMOL/L — SIGNIFICANT CHANGE UP (ref 22–31)
CREAT SERPL-MCNC: 0.84 MG/DL — SIGNIFICANT CHANGE UP (ref 0.5–1.3)
EGFR: 69 ML/MIN/1.73M2 — SIGNIFICANT CHANGE UP
GLUCOSE SERPL-MCNC: 109 MG/DL — HIGH (ref 70–99)
HCT VFR BLD CALC: 24.2 % — LOW (ref 34.5–45)
HGB BLD-MCNC: 7.5 G/DL — LOW (ref 11.5–15.5)
MCHC RBC-ENTMCNC: 28.8 PG — SIGNIFICANT CHANGE UP (ref 27–34)
MCHC RBC-ENTMCNC: 31 G/DL — LOW (ref 32–36)
MCV RBC AUTO: 93.1 FL — SIGNIFICANT CHANGE UP (ref 80–100)
NRBC # BLD: 0 /100 WBCS — SIGNIFICANT CHANGE UP (ref 0–0)
PLATELET # BLD AUTO: 197 K/UL — SIGNIFICANT CHANGE UP (ref 150–400)
POTASSIUM SERPL-MCNC: 4.3 MMOL/L — SIGNIFICANT CHANGE UP (ref 3.5–5.3)
POTASSIUM SERPL-SCNC: 4.3 MMOL/L — SIGNIFICANT CHANGE UP (ref 3.5–5.3)
RBC # BLD: 2.6 M/UL — LOW (ref 3.8–5.2)
RBC # FLD: 14 % — SIGNIFICANT CHANGE UP (ref 10.3–14.5)
SODIUM SERPL-SCNC: 140 MMOL/L — SIGNIFICANT CHANGE UP (ref 135–145)
WBC # BLD: 5.76 K/UL — SIGNIFICANT CHANGE UP (ref 3.8–10.5)
WBC # FLD AUTO: 5.76 K/UL — SIGNIFICANT CHANGE UP (ref 3.8–10.5)

## 2024-12-28 PROCEDURE — 99233 SBSQ HOSP IP/OBS HIGH 50: CPT

## 2024-12-28 RX ORDER — GINKGO BILOBA 40 MG
5 CAPSULE ORAL AT BEDTIME
Refills: 0 | Status: DISCONTINUED | OUTPATIENT
Start: 2024-12-28 | End: 2025-01-04

## 2024-12-28 RX ADMIN — Medication 500 MILLIGRAM(S): at 21:12

## 2024-12-28 RX ADMIN — HYDROXYCHLOROQUINE SULFATE 200 MILLIGRAM(S): 200 TABLET ORAL at 05:33

## 2024-12-28 RX ADMIN — ACETAMINOPHEN 1000 MILLIGRAM(S): 80 SOLUTION/ DROPS ORAL at 21:12

## 2024-12-28 RX ADMIN — ACETAMINOPHEN 1000 MILLIGRAM(S): 80 SOLUTION/ DROPS ORAL at 13:35

## 2024-12-28 RX ADMIN — DILTIAZEM HYDROCHLORIDE 120 MILLIGRAM(S): 300 CAPSULE, COATED, EXTENDED RELEASE ORAL at 05:34

## 2024-12-28 RX ADMIN — Medication 500 MILLIGRAM(S): at 13:35

## 2024-12-28 RX ADMIN — ACETAMINOPHEN 1000 MILLIGRAM(S): 80 SOLUTION/ DROPS ORAL at 05:34

## 2024-12-28 RX ADMIN — PANTOPRAZOLE 40 MILLIGRAM(S): 40 TABLET, DELAYED RELEASE ORAL at 05:34

## 2024-12-28 RX ADMIN — Medication 500 MILLIGRAM(S): at 05:34

## 2024-12-28 RX ADMIN — ATORVASTATIN CALCIUM 80 MILLIGRAM(S): 40 TABLET, FILM COATED ORAL at 21:12

## 2024-12-28 RX ADMIN — HYDROXYCHLOROQUINE SULFATE 200 MILLIGRAM(S): 200 TABLET ORAL at 18:21

## 2024-12-28 RX ADMIN — DULOXETINE HYDROCHLORIDE 30 MILLIGRAM(S): 30 CAPSULE, DELAYED RELEASE ORAL at 13:34

## 2024-12-28 NOTE — PROGRESS NOTE ADULT - SUBJECTIVE AND OBJECTIVE BOX
Ortho Note    Pt comfortable without complaints, pain controlled  Denies CP, SOB, N/V, numbness/tingling     Vital Signs Last 24 Hrs  T(C): 36.4 (12-28-24 @ 05:32), Max: 36.4 (12-28-24 @ 05:32)  T(F): 97.6 (12-28-24 @ 05:32), Max: 97.6 (12-28-24 @ 05:32)  HR: 76 (12-28-24 @ 06:04) (76 - 76)  BP: 156/75 (12-28-24 @ 06:04) (156/70 - 156/75)  BP(mean): --  RR: 15 (12-28-24 @ 06:04) (15 - 15)  SpO2: 97% (12-28-24 @ 06:04) (97% - 97%)  I&O's Summary    27 Dec 2024 07:01  -  28 Dec 2024 07:00  --------------------------------------------------------  IN: 0 mL / OUT: 640 mL / NET: -640 mL        General: Pt Alert and oriented, NAD  DSG C/D/I  Pulses: 2+  Sensation: SILT  Motor: 5/5 Quad/Ham/EHL/FHL/TA/GS                          7.5    5.76  )-----------( 197      ( 28 Dec 2024 05:30 )             24.2     12-28    140  |  106  |  11  ----------------------------<  109[H]  4.3   |  27  |  0.84    Ca    8.0[L]      28 Dec 2024 05:30        A/P: 84yF s/p WINIFRED, Exploration of fusion, extension of fusion to T1 w/ Tethers to C7 by Dr. TONO Salguero on 12-23  - Weight Bearing Status: WBAT  - Pain control  - DVT PPx: SCDs  - PT rec: AR  - F/u AM labs  - Dispo: AR pending drain  - Continue drain HV 50/140    Ortho Pager [phone]

## 2024-12-28 NOTE — PROGRESS NOTE ADULT - SUBJECTIVE AND OBJECTIVE BOX
Patient is a 84y old  Female who presents with a chief complaint of back pain (28 Dec 2024 07:21)      INTERVAL HPI/OVERNIGHT EVENTS: offers no new complaints; current symptoms resolving    MEDICATIONS  (STANDING):  acetaminophen     Tablet .. 1000 milliGRAM(s) Oral every 8 hours  atorvastatin 80 milliGRAM(s) Oral at bedtime  diltiazem    milliGRAM(s) Oral daily  DULoxetine 30 milliGRAM(s) Oral daily  hydroxychloroquine 200 milliGRAM(s) Oral two times a day  methocarbamol 500 milliGRAM(s) Oral every 8 hours  pantoprazole    Tablet 40 milliGRAM(s) Oral before breakfast  polyethylene glycol 3350 17 Gram(s) Oral at bedtime  senna 2 Tablet(s) Oral at bedtime    MEDICATIONS  (PRN):  albuterol    90 MICROgram(s) HFA Inhaler 2 Puff(s) Inhalation every 6 hours PRN PRN  HYDROmorphone  Injectable 0.5 milliGRAM(s) IV Push every 4 hours PRN breakthrough pain on floor  HYDROmorphone  Injectable 0.5 milliGRAM(s) IV Push every 15 minutes PRN breakthrough pain in PACU  melatonin 5 milliGRAM(s) Oral at bedtime PRN Sleep  ondansetron   Disintegrating Tablet 4 milliGRAM(s) Oral every 6 hours PRN Nausea and/or Vomiting  oxyCODONE    IR 5 milliGRAM(s) Oral every 4 hours PRN Moderate Pain (4 - 6)  oxyCODONE    IR 10 milliGRAM(s) Oral every 4 hours PRN Severe Pain (7 - 10)  simethicone 80 milliGRAM(s) Chew three times a day PRN Gas      __________________________________________________  REVIEW OF SYSTEMS:    CONSTITUTIONAL: No fever,   EYES: no acute visual disturbances  NECK: No pain or stiffness  RESPIRATORY: No cough; No shortness of breath  CARDIOVASCULAR: No chest pain, no palpitations  GASTROINTESTINAL: No pain. No nausea or vomiting; No diarrhea   NEUROLOGICAL: No headache or numbness, no tremors  MUSCULOSKELETAL: No joint pain, no muscle pain  GENITOURINARY: no dysuria, no frequency, no hesitancy  PSYCHIATRY: no depression , no anxiety  ALL OTHER  ROS negative        Vital Signs Last 24 Hrs  T(C): 36.7 (28 Dec 2024 08:34), Max: 37.2 (27 Dec 2024 14:28)  T(F): 98 (28 Dec 2024 08:34), Max: 98.9 (27 Dec 2024 14:28)  HR: 75 (28 Dec 2024 08:34) (74 - 79)  BP: 145/68 (28 Dec 2024 08:34) (137/65 - 156/75)  BP(mean): --  RR: 18 (28 Dec 2024 08:34) (15 - 18)  SpO2: 98% (28 Dec 2024 08:34) (96% - 100%)    Parameters below as of 28 Dec 2024 08:34  Patient On (Oxygen Delivery Method): room air        ________________________________________________  PHYSICAL EXAM:  GENERAL: NAD  HEENT: Normocephalic;  conjunctivae and sclerae clear; moist mucous membranes;   NECK : supple  CHEST/LUNG: Clear to auscultation bilaterally with good air entry   HEART: S1 S2  regular; no murmurs, gallops or rubs  ABDOMEN: Soft, Nontender, Nondistended; Bowel sounds present  EXTREMITIES: no cyanosis; no edema; no calf tenderness  SKIN: warm and dry; no rash  NERVOUS SYSTEM:  Awake and alert; Oriented  to place, person and time ; no new deficits    _________________________________________________  LABS:                        7.5    5.76  )-----------( 197      ( 28 Dec 2024 05:30 )             24.2     12-28    140  |  106  |  11  ----------------------------<  109[H]  4.3   |  27  |  0.84    Ca    8.0[L]      28 Dec 2024 05:30        Urinalysis Basic - ( 28 Dec 2024 05:30 )    Color: x / Appearance: x / SG: x / pH: x  Gluc: 109 mg/dL / Ketone: x  / Bili: x / Urobili: x   Blood: x / Protein: x / Nitrite: x   Leuk Esterase: x / RBC: x / WBC x   Sq Epi: x / Non Sq Epi: x / Bacteria: x      CAPILLARY BLOOD GLUCOSE            RADIOLOGY & ADDITIONAL TESTS:      Plan of care was discussed with patient and /or primary care giver; all questions and concerns were addressed and care was aligned with patient's wishes.       Patient is a 84y old  Female who presents with a chief complaint of back pain (28 Dec 2024 07:21)      INTERVAL HPI/OVERNIGHT EVENTS: offers no new complaints; Had to BMs today and passing flatus. Abdomen is soft and less distended     MEDICATIONS  (STANDING):  acetaminophen     Tablet .. 1000 milliGRAM(s) Oral every 8 hours  atorvastatin 80 milliGRAM(s) Oral at bedtime  diltiazem    milliGRAM(s) Oral daily  DULoxetine 30 milliGRAM(s) Oral daily  hydroxychloroquine 200 milliGRAM(s) Oral two times a day  methocarbamol 500 milliGRAM(s) Oral every 8 hours  pantoprazole    Tablet 40 milliGRAM(s) Oral before breakfast  polyethylene glycol 3350 17 Gram(s) Oral at bedtime  senna 2 Tablet(s) Oral at bedtime    MEDICATIONS  (PRN):  albuterol    90 MICROgram(s) HFA Inhaler 2 Puff(s) Inhalation every 6 hours PRN PRN  HYDROmorphone  Injectable 0.5 milliGRAM(s) IV Push every 4 hours PRN breakthrough pain on floor  HYDROmorphone  Injectable 0.5 milliGRAM(s) IV Push every 15 minutes PRN breakthrough pain in PACU  melatonin 5 milliGRAM(s) Oral at bedtime PRN Sleep  ondansetron   Disintegrating Tablet 4 milliGRAM(s) Oral every 6 hours PRN Nausea and/or Vomiting  oxyCODONE    IR 5 milliGRAM(s) Oral every 4 hours PRN Moderate Pain (4 - 6)  oxyCODONE    IR 10 milliGRAM(s) Oral every 4 hours PRN Severe Pain (7 - 10)  simethicone 80 milliGRAM(s) Chew three times a day PRN Gas      Vital Signs Last 24 Hrs  T(C): 36.7 (28 Dec 2024 08:34), Max: 37.2 (27 Dec 2024 14:28)  T(F): 98 (28 Dec 2024 08:34), Max: 98.9 (27 Dec 2024 14:28)  HR: 75 (28 Dec 2024 08:34) (74 - 79)  BP: 145/68 (28 Dec 2024 08:34) (137/65 - 156/75)  BP(mean): --  RR: 18 (28 Dec 2024 08:34) (15 - 18)  SpO2: 98% (28 Dec 2024 08:34) (96% - 100%)    Parameters below as of 28 Dec 2024 08:34  Patient On (Oxygen Delivery Method): room air        ________________________________________________  PHYSICAL EXAM:  GENERAL: NAD  HEENT: moist mucous membranes;   NECK : supple  CHEST/LUNG: Clear to auscultation bilaterally  HEART: S1 S2  regular  ABDOMEN: Soft, Nontender, Nondistended  EXTREMITIES: no cyanosis; no edema;  SKIN: warm and dry; no rash  NERVOUS SYSTEM:  Awake and alert; Oriented  to place, person and time ; no new deficits    _________________________________________________  LABS:                        7.5    5.76  )-----------( 197      ( 28 Dec 2024 05:30 )             24.2     12-28    140  |  106  |  11  ----------------------------<  109[H]  4.3   |  27  |  0.84    Ca    8.0[L]      28 Dec 2024 05:30        Urinalysis Basic - ( 28 Dec 2024 05:30 )    Color: x / Appearance: x / SG: x / pH: x  Gluc: 109 mg/dL / Ketone: x  / Bili: x / Urobili: x   Blood: x / Protein: x / Nitrite: x   Leuk Esterase: x / RBC: x / WBC x   Sq Epi: x / Non Sq Epi: x / Bacteria: x      CAPILLARY BLOOD GLUCOSE            RADIOLOGY & ADDITIONAL TESTS:      Plan of care was discussed with patient and /or primary care giver; all questions and concerns were addressed and care was aligned with patient's wishes.

## 2024-12-28 NOTE — PROGRESS NOTE ADULT - ASSESSMENT
84 year old female with a pmh of HTN, CAD, HLD, CKD stage 3, Erosive osteoarthritis, asthma and anxiety who is known to our orthopedic surgery with recent admission and spinal surgery in Nov. 2024, now returns to [facility] with severe back pain and is pending OR on Monday for removal of hardware, exploration of fusion and extension of fusion to C7 (C7 to T3).  Medicine is consulted for comanagement.     # back pain  - s/p WINIFRED, Exploration of fusion, extension of fusion to T1 w/ Tethers to C7 by Dr. TONO Salguero on 12-23  - pain regimen per primary team  - ensure bowel regimen  - can offer lidocaine patch   - can offer melatonin at night    #Fecal impaction   -NG placed on OR to LIWS  -Empty rectal vault on fecal disimpaction    -NGT removed 12/25 12/27: abd distended but having BMs and passing flatus. Xray abd ordered, informed no SBO on prelim read.   Diet advanced as tolerated   Call gen surg to evaluate if vomiting/worsening distention or abd pain develops.  - Gen surg following     #CAD  - no history of MI or stents  - EKG nonischemic  - continue statin     # LA enlargement  Seen on echo in last admission.  - TTE showed left atrial dilation but normal EF and no significant valvular disease  - appears euvolemic  - continue with outpatient follow up with cardiology  - monitor for orthostatic symptoms    #HTN  - on home Valsartan 160 BID: resume with hold parameters   - on diltiazem 120 mg at home - continue     #Asthma  - clear lungs, no acute exacerbation - albuterol inhaler PRN    #Erosive OA  - resume home dose of plaquenil     #CKD stage 3  - Cr stable  - avoid nephrotoxins  - monitor renal function and electrolytes    # Acute blood loss anemia  # chronic iron deficiency anemia  - Drop in H/H this am likely due to operative losses   - s/p 1 prbc; Hb 7.2 today ; will continue to monitor   - trend CBC  - maintain active T&S, two large bore IVs  - transfuse for Hgb <7     SCD for DVT ppx as per ortho    84 year old female with a pmh of HTN, CAD, HLD, CKD stage 3, Erosive osteoarthritis, asthma and anxiety who is known to our orthopedic surgery with recent admission and spinal surgery in Nov. 2024, now returns to [facility] with severe back pain and is pending OR on Monday for removal of hardware, exploration of fusion and extension of fusion to C7 (C7 to T3).  Medicine is consulted for comanagement.     # back pain  - s/p WINIFRED, Exploration of fusion, extension of fusion to T1 w/ Tethers to C7 by Dr. TONO Salguero on 12-23  - pain regimen per primary team  - ensure bowel regimen  - can offer lidocaine patch   - can offer melatonin at night    #Fecal impaction   -NG placed on OR to LIWS  -Empty rectal vault on fecal disimpaction    -NGT removed 12/25 12/27: abd distended but having BMs and passing flatus. Xray abd ordered, informed no SBO on prelim read.   Diet advanced as tolerated   Call gen surg to evaluate if vomiting/worsening distention or abd pain develops.  Continue bowel regime; patient had 2 BMs today and is passing flatus   - Gen surg following     #CAD  - no history of MI or stents  - EKG nonischemic  - continue statin     # LA enlargement  Seen on echo in last admission.  - TTE showed left atrial dilation but normal EF and no significant valvular disease  - appears euvolemic  - continue with outpatient follow up with cardiology  - monitor for orthostatic symptoms    #HTN  - on home Valsartan 160 BID: resume with hold parameters   - on diltiazem 120 mg at home - continue     #Asthma  - clear lungs, no acute exacerbation - albuterol inhaler PRN    #Erosive OA  - resume home dose of plaquenil     #CKD stage 3  - Cr stable  - avoid nephrotoxins  - monitor renal function and electrolytes    # Acute blood loss anemia  # chronic iron deficiency anemia  - Drop in H/H this am likely due to operative losses   - s/p 1 prbc; Hb 7.5 today ; will continue to monitor   - trend CBC  - maintain active T&S, two large bore IVs  - transfuse for Hgb <7     SCD for DVT ppx as per ortho

## 2024-12-29 LAB
ANION GAP SERPL CALC-SCNC: 9 MMOL/L — SIGNIFICANT CHANGE UP (ref 5–17)
BUN SERPL-MCNC: 10 MG/DL — SIGNIFICANT CHANGE UP (ref 7–23)
CALCIUM SERPL-MCNC: 8.3 MG/DL — LOW (ref 8.4–10.5)
CHLORIDE SERPL-SCNC: 108 MMOL/L — SIGNIFICANT CHANGE UP (ref 96–108)
CO2 SERPL-SCNC: 22 MMOL/L — SIGNIFICANT CHANGE UP (ref 22–31)
CREAT SERPL-MCNC: 0.81 MG/DL — SIGNIFICANT CHANGE UP (ref 0.5–1.3)
EGFR: 72 ML/MIN/1.73M2 — SIGNIFICANT CHANGE UP
GLUCOSE SERPL-MCNC: 115 MG/DL — HIGH (ref 70–99)
HCT VFR BLD CALC: 27.4 % — LOW (ref 34.5–45)
HGB BLD-MCNC: 8.4 G/DL — LOW (ref 11.5–15.5)
MCHC RBC-ENTMCNC: 27.7 PG — SIGNIFICANT CHANGE UP (ref 27–34)
MCHC RBC-ENTMCNC: 30.7 G/DL — LOW (ref 32–36)
MCV RBC AUTO: 90.4 FL — SIGNIFICANT CHANGE UP (ref 80–100)
NRBC # BLD: 0 /100 WBCS — SIGNIFICANT CHANGE UP (ref 0–0)
PLATELET # BLD AUTO: 288 K/UL — SIGNIFICANT CHANGE UP (ref 150–400)
POTASSIUM SERPL-MCNC: 4.4 MMOL/L — SIGNIFICANT CHANGE UP (ref 3.5–5.3)
POTASSIUM SERPL-SCNC: 4.4 MMOL/L — SIGNIFICANT CHANGE UP (ref 3.5–5.3)
RBC # BLD: 3.03 M/UL — LOW (ref 3.8–5.2)
RBC # FLD: 13.8 % — SIGNIFICANT CHANGE UP (ref 10.3–14.5)
SODIUM SERPL-SCNC: 139 MMOL/L — SIGNIFICANT CHANGE UP (ref 135–145)
WBC # BLD: 9.52 K/UL — SIGNIFICANT CHANGE UP (ref 3.8–10.5)
WBC # FLD AUTO: 9.52 K/UL — SIGNIFICANT CHANGE UP (ref 3.8–10.5)

## 2024-12-29 PROCEDURE — 99232 SBSQ HOSP IP/OBS MODERATE 35: CPT

## 2024-12-29 RX ORDER — LOPERAMIDE HCL 1 MG/5 ML
2 LIQUID (ML) ORAL DAILY
Refills: 0 | Status: DISCONTINUED | OUTPATIENT
Start: 2024-12-29 | End: 2024-12-29

## 2024-12-29 RX ORDER — TRAZODONE HYDROCHLORIDE 150 MG/1
75 TABLET ORAL AT BEDTIME
Refills: 0 | Status: DISCONTINUED | OUTPATIENT
Start: 2024-12-29 | End: 2024-12-29

## 2024-12-29 RX ORDER — LOPERAMIDE HCL 1 MG/5 ML
2 LIQUID (ML) ORAL EVERY 12 HOURS
Refills: 0 | Status: DISCONTINUED | OUTPATIENT
Start: 2024-12-29 | End: 2025-01-04

## 2024-12-29 RX ORDER — TRAZODONE HYDROCHLORIDE 150 MG/1
75 TABLET ORAL AT BEDTIME
Refills: 0 | Status: DISCONTINUED | OUTPATIENT
Start: 2024-12-29 | End: 2024-12-30

## 2024-12-29 RX ADMIN — HYDROXYCHLOROQUINE SULFATE 200 MILLIGRAM(S): 200 TABLET ORAL at 06:03

## 2024-12-29 RX ADMIN — ACETAMINOPHEN 1000 MILLIGRAM(S): 80 SOLUTION/ DROPS ORAL at 21:04

## 2024-12-29 RX ADMIN — HYDROXYCHLOROQUINE SULFATE 200 MILLIGRAM(S): 200 TABLET ORAL at 17:28

## 2024-12-29 RX ADMIN — DULOXETINE HYDROCHLORIDE 30 MILLIGRAM(S): 30 CAPSULE, DELAYED RELEASE ORAL at 13:04

## 2024-12-29 RX ADMIN — ATORVASTATIN CALCIUM 80 MILLIGRAM(S): 40 TABLET, FILM COATED ORAL at 21:04

## 2024-12-29 RX ADMIN — Medication 500 MILLIGRAM(S): at 06:04

## 2024-12-29 RX ADMIN — Medication 2 MILLIGRAM(S): at 08:37

## 2024-12-29 RX ADMIN — Medication 500 MILLIGRAM(S): at 21:05

## 2024-12-29 RX ADMIN — PANTOPRAZOLE 40 MILLIGRAM(S): 40 TABLET, DELAYED RELEASE ORAL at 06:03

## 2024-12-29 RX ADMIN — ACETAMINOPHEN 1000 MILLIGRAM(S): 80 SOLUTION/ DROPS ORAL at 06:04

## 2024-12-29 RX ADMIN — DILTIAZEM HYDROCHLORIDE 120 MILLIGRAM(S): 300 CAPSULE, COATED, EXTENDED RELEASE ORAL at 06:03

## 2024-12-29 RX ADMIN — Medication 500 MILLIGRAM(S): at 13:04

## 2024-12-29 RX ADMIN — TRAZODONE HYDROCHLORIDE 75 MILLIGRAM(S): 150 TABLET ORAL at 19:38

## 2024-12-29 RX ADMIN — Medication 5 MILLIGRAM(S): at 00:15

## 2024-12-29 RX ADMIN — ACETAMINOPHEN 1000 MILLIGRAM(S): 80 SOLUTION/ DROPS ORAL at 13:03

## 2024-12-29 NOTE — PROGRESS NOTE ADULT - SUBJECTIVE AND OBJECTIVE BOX
Patient is a 84y old  Female who presents with a chief complaint of back pain (29 Dec 2024 07:07)      INTERVAL HPI/OVERNIGHT EVENTS: offers no new complaints; current symptoms resolving    MEDICATIONS  (STANDING):  acetaminophen     Tablet .. 1000 milliGRAM(s) Oral every 8 hours  atorvastatin 80 milliGRAM(s) Oral at bedtime  diltiazem    milliGRAM(s) Oral daily  DULoxetine 30 milliGRAM(s) Oral daily  hydroxychloroquine 200 milliGRAM(s) Oral two times a day  methocarbamol 500 milliGRAM(s) Oral every 8 hours  pantoprazole    Tablet 40 milliGRAM(s) Oral before breakfast  polyethylene glycol 3350 17 Gram(s) Oral at bedtime  senna 2 Tablet(s) Oral at bedtime    MEDICATIONS  (PRN):  albuterol    90 MICROgram(s) HFA Inhaler 2 Puff(s) Inhalation every 6 hours PRN PRN  HYDROmorphone  Injectable 0.5 milliGRAM(s) IV Push every 4 hours PRN breakthrough pain on floor  HYDROmorphone  Injectable 0.5 milliGRAM(s) IV Push every 15 minutes PRN breakthrough pain in PACU  loperamide 2 milliGRAM(s) Oral daily PRN Diarrhea  melatonin 5 milliGRAM(s) Oral at bedtime PRN Sleep  ondansetron   Disintegrating Tablet 4 milliGRAM(s) Oral every 6 hours PRN Nausea and/or Vomiting  oxyCODONE    IR 5 milliGRAM(s) Oral every 4 hours PRN Moderate Pain (4 - 6)  oxyCODONE    IR 10 milliGRAM(s) Oral every 4 hours PRN Severe Pain (7 - 10)  simethicone 80 milliGRAM(s) Chew three times a day PRN Gas      __________________________________________________  REVIEW OF SYSTEMS:    CONSTITUTIONAL: No fever,   EYES: no acute visual disturbances  NECK: No pain or stiffness  RESPIRATORY: No cough; No shortness of breath  CARDIOVASCULAR: No chest pain, no palpitations  GASTROINTESTINAL: No pain. No nausea or vomiting; No diarrhea   NEUROLOGICAL: No headache or numbness, no tremors  MUSCULOSKELETAL: No joint pain, no muscle pain  GENITOURINARY: no dysuria, no frequency, no hesitancy  PSYCHIATRY: no depression , no anxiety  ALL OTHER  ROS negative        Vital Signs Last 24 Hrs  T(C): 36.7 (29 Dec 2024 08:06), Max: 37 (28 Dec 2024 17:48)  T(F): 98 (29 Dec 2024 08:06), Max: 98.6 (28 Dec 2024 17:48)  HR: 79 (29 Dec 2024 08:06) (79 - 88)  BP: 149/67 (29 Dec 2024 08:06) (140/62 - 163/70)  BP(mean): --  RR: 18 (29 Dec 2024 08:06) (16 - 19)  SpO2: 98% (29 Dec 2024 08:06) (95% - 99%)    Parameters below as of 29 Dec 2024 08:06  Patient On (Oxygen Delivery Method): room air        ________________________________________________  PHYSICAL EXAM:  GENERAL: NAD  HEENT: Normocephalic;  conjunctivae and sclerae clear; moist mucous membranes;   NECK : supple  CHEST/LUNG: Clear to auscultation bilaterally with good air entry   HEART: S1 S2  regular; no murmurs, gallops or rubs  ABDOMEN: Soft, Nontender, Nondistended; Bowel sounds present  EXTREMITIES: no cyanosis; no edema; no calf tenderness  SKIN: warm and dry; no rash  NERVOUS SYSTEM:  Awake and alert; Oriented  to place, person and time ; no new deficits    _________________________________________________  LABS:                        8.4    9.52  )-----------( 288      ( 29 Dec 2024 05:30 )             27.4     12-29    139  |  108  |  10  ----------------------------<  115[H]  4.4   |  22  |  0.81    Ca    8.3[L]      29 Dec 2024 05:30        Urinalysis Basic - ( 29 Dec 2024 05:30 )    Color: x / Appearance: x / SG: x / pH: x  Gluc: 115 mg/dL / Ketone: x  / Bili: x / Urobili: x   Blood: x / Protein: x / Nitrite: x   Leuk Esterase: x / RBC: x / WBC x   Sq Epi: x / Non Sq Epi: x / Bacteria: x      CAPILLARY BLOOD GLUCOSE            RADIOLOGY & ADDITIONAL TESTS:      Plan of care was discussed with patient and /or primary care giver; all questions and concerns were addressed and care was aligned with patient's wishes.       Patient is a 84y old  Female who presents with a chief complaint of back pain (29 Dec 2024 07:07)      INTERVAL HPI/OVERNIGHT EVENTS: 4 BMs overnight, was started on Imodium by primary team. Abdomen is Nontender, non distended     MEDICATIONS  (STANDING):  acetaminophen     Tablet .. 1000 milliGRAM(s) Oral every 8 hours  atorvastatin 80 milliGRAM(s) Oral at bedtime  diltiazem    milliGRAM(s) Oral daily  DULoxetine 30 milliGRAM(s) Oral daily  hydroxychloroquine 200 milliGRAM(s) Oral two times a day  methocarbamol 500 milliGRAM(s) Oral every 8 hours  pantoprazole    Tablet 40 milliGRAM(s) Oral before breakfast  polyethylene glycol 3350 17 Gram(s) Oral at bedtime  senna 2 Tablet(s) Oral at bedtime    MEDICATIONS  (PRN):  albuterol    90 MICROgram(s) HFA Inhaler 2 Puff(s) Inhalation every 6 hours PRN PRN  HYDROmorphone  Injectable 0.5 milliGRAM(s) IV Push every 4 hours PRN breakthrough pain on floor  HYDROmorphone  Injectable 0.5 milliGRAM(s) IV Push every 15 minutes PRN breakthrough pain in PACU  loperamide 2 milliGRAM(s) Oral daily PRN Diarrhea  melatonin 5 milliGRAM(s) Oral at bedtime PRN Sleep  ondansetron   Disintegrating Tablet 4 milliGRAM(s) Oral every 6 hours PRN Nausea and/or Vomiting  oxyCODONE    IR 5 milliGRAM(s) Oral every 4 hours PRN Moderate Pain (4 - 6)  oxyCODONE    IR 10 milliGRAM(s) Oral every 4 hours PRN Severe Pain (7 - 10)  simethicone 80 milliGRAM(s) Chew three times a day PRN Gas    Vital Signs Last 24 Hrs  T(C): 36.7 (29 Dec 2024 08:06), Max: 37 (28 Dec 2024 17:48)  T(F): 98 (29 Dec 2024 08:06), Max: 98.6 (28 Dec 2024 17:48)  HR: 79 (29 Dec 2024 08:06) (79 - 88)  BP: 149/67 (29 Dec 2024 08:06) (140/62 - 163/70)  BP(mean): --  RR: 18 (29 Dec 2024 08:06) (16 - 19)  SpO2: 98% (29 Dec 2024 08:06) (95% - 99%)    Parameters below as of 29 Dec 2024 08:06  Patient On (Oxygen Delivery Method): room air        ________________________________________________  PHYSICAL EXAM:  GENERAL: NAD  HEENT: moist mucous membranes;   NECK : supple  CHEST/LUNG: Clear to auscultation bilaterally  HEART: S1 S2  +  ABDOMEN: Soft, Nontender, Nondistended;  EXTREMITIES: no cyanosis; no edema; no calf tenderness  SKIN: warm and dry; no rash  NERVOUS SYSTEM:  Awake and alert; Oriented  to place, person and time ; no new deficits    _________________________________________________  LABS:                        8.4    9.52  )-----------( 288      ( 29 Dec 2024 05:30 )             27.4     12-29    139  |  108  |  10  ----------------------------<  115[H]  4.4   |  22  |  0.81    Ca    8.3[L]      29 Dec 2024 05:30        Urinalysis Basic - ( 29 Dec 2024 05:30 )    Color: x / Appearance: x / SG: x / pH: x  Gluc: 115 mg/dL / Ketone: x  / Bili: x / Urobili: x   Blood: x / Protein: x / Nitrite: x   Leuk Esterase: x / RBC: x / WBC x   Sq Epi: x / Non Sq Epi: x / Bacteria: x      CAPILLARY BLOOD GLUCOSE            RADIOLOGY & ADDITIONAL TESTS:      Plan of care was discussed with patient and /or primary care giver; all questions and concerns were addressed and care was aligned with patient's wishes.

## 2024-12-29 NOTE — PROGRESS NOTE ADULT - SUBJECTIVE AND OBJECTIVE BOX
Ortho Note    Pt comfortable without complaints, pain controlled. Had frequent BM overnight.  Denies CP, SOB, N/V, numbness/tingling     Vital Signs Last 24 Hrs  T(C): 36.6 (12-29-24 @ 05:22), Max: 36.6 (12-29-24 @ 05:22)  T(F): 97.9 (12-29-24 @ 05:22), Max: 97.9 (12-29-24 @ 05:22)  HR: 88 (12-29-24 @ 05:22) (88 - 88)  BP: 160/77 (12-29-24 @ 05:22) (160/77 - 160/77)  BP(mean): --  RR: 19 (12-29-24 @ 05:22) (19 - 19)  SpO2: 98% (12-29-24 @ 05:22) (98% - 98%)  I&O's Summary    28 Dec 2024 07:01  -  29 Dec 2024 07:00  --------------------------------------------------------  IN: 0 mL / OUT: 215 mL / NET: -215 mL        General: Pt Alert and oriented, NAD  DSG C/D/I  Pulses: 2+  Sensation: SILT  Motor: 5/5 C5/T1, 5/5Quad/Ham/EHL/FHL/TA/GS                          8.4    9.52  )-----------( 288      ( 29 Dec 2024 05:30 )             27.4     12-29    139  |  108  |  10  ----------------------------<  115[H]  4.4   |  22  |  0.81    Ca    8.3[L]      29 Dec 2024 05:30        A/P: 84yF s/p WINIFRED, Exploration of fusion, extension of fusion to T1 w/ Tethers to C7 by Dr. TONO Salguero on 12-23  - Weight Bearing Status: WBAT  - Pain control  - DVT PPx: SCDs  - PT rec: AR  - Dispo: AR pending drain removal  -  HV 60/115    Ortho Pager [phone]

## 2024-12-29 NOTE — PROGRESS NOTE ADULT - ASSESSMENT
84 year old female with a pmh of HTN, CAD, HLD, CKD stage 3, Erosive osteoarthritis, asthma and anxiety who is known to our orthopedic surgery with recent admission and spinal surgery in Nov. 2024, now returns to [facility] with severe back pain and is pending OR on Monday for removal of hardware, exploration of fusion and extension of fusion to C7 (C7 to T3).  Medicine is consulted for comanagement.     # back pain  - s/p WINIFRED, Exploration of fusion, extension of fusion to T1 w/ Tethers to C7 by Dr. TONO Salguero on 12-23  - pain regimen per primary team  - ensure bowel regimen  - can offer lidocaine patch   - can offer melatonin at night    #Fecal impaction   -NG placed on OR to LIWS  -Empty rectal vault on fecal disimpaction    -NGT removed 12/25 12/27: abd distended but having BMs and passing flatus. Xray abd ordered, informed no SBO on prelim read.   Diet advanced   Call gen surg to evaluate if vomiting/worsening distention or abd pain develops.  patient had 4 BMs today and is passing flatus   R/O GI infection with GI pcr before imodium.     - Gen surg following     #CAD  - no history of MI or stents  - EKG nonischemic  - continue statin     # LA enlargement  Seen on echo in last admission.  - TTE showed left atrial dilation but normal EF and no significant valvular disease  - appears euvolemic  - continue with outpatient follow up with cardiology  - monitor for orthostatic symptoms    #HTN  - on home Valsartan 160 BID: resume with hold parameters   - on diltiazem 120 mg at home - continue     #Asthma  - clear lungs, no acute exacerbation - albuterol inhaler PRN    #Erosive OA  - resume home dose of plaquenil     #CKD stage 3  - Cr stable  - avoid nephrotoxins  - monitor renal function and electrolytes    # Acute blood loss anemia  # chronic iron deficiency anemia  - Drop in H/H this am likely due to operative losses   - s/p 1 prbc; Hb 7.5 today ; will continue to monitor   - trend CBC  - maintain active T&S, two large bore IVs  - transfuse for Hgb <7     SCD for DVT ppx as per ortho

## 2024-12-30 LAB
ANION GAP SERPL CALC-SCNC: 12 MMOL/L — SIGNIFICANT CHANGE UP (ref 5–17)
BUN SERPL-MCNC: 7 MG/DL — SIGNIFICANT CHANGE UP (ref 7–23)
CALCIUM SERPL-MCNC: 8.8 MG/DL — SIGNIFICANT CHANGE UP (ref 8.4–10.5)
CHLORIDE SERPL-SCNC: 111 MMOL/L — HIGH (ref 96–108)
CO2 SERPL-SCNC: 19 MMOL/L — LOW (ref 22–31)
CREAT SERPL-MCNC: 0.76 MG/DL — SIGNIFICANT CHANGE UP (ref 0.5–1.3)
EGFR: 78 ML/MIN/1.73M2 — SIGNIFICANT CHANGE UP
GLUCOSE SERPL-MCNC: 108 MG/DL — HIGH (ref 70–99)
HCT VFR BLD CALC: 27.2 % — LOW (ref 34.5–45)
HGB BLD-MCNC: 8.2 G/DL — LOW (ref 11.5–15.5)
MCHC RBC-ENTMCNC: 28.5 PG — SIGNIFICANT CHANGE UP (ref 27–34)
MCHC RBC-ENTMCNC: 30.1 G/DL — LOW (ref 32–36)
MCV RBC AUTO: 94.4 FL — SIGNIFICANT CHANGE UP (ref 80–100)
NRBC # BLD: 0 /100 WBCS — SIGNIFICANT CHANGE UP (ref 0–0)
PLATELET # BLD AUTO: 244 K/UL — SIGNIFICANT CHANGE UP (ref 150–400)
POTASSIUM SERPL-MCNC: 4.5 MMOL/L — SIGNIFICANT CHANGE UP (ref 3.5–5.3)
POTASSIUM SERPL-SCNC: 4.5 MMOL/L — SIGNIFICANT CHANGE UP (ref 3.5–5.3)
RBC # BLD: 2.88 M/UL — LOW (ref 3.8–5.2)
RBC # FLD: 14 % — SIGNIFICANT CHANGE UP (ref 10.3–14.5)
SODIUM SERPL-SCNC: 142 MMOL/L — SIGNIFICANT CHANGE UP (ref 135–145)
WBC # BLD: 7.37 K/UL — SIGNIFICANT CHANGE UP (ref 3.8–10.5)
WBC # FLD AUTO: 7.37 K/UL — SIGNIFICANT CHANGE UP (ref 3.8–10.5)

## 2024-12-30 PROCEDURE — 99233 SBSQ HOSP IP/OBS HIGH 50: CPT

## 2024-12-30 RX ORDER — VALSARTAN 80 MG/1
160 TABLET ORAL DAILY
Refills: 0 | Status: DISCONTINUED | OUTPATIENT
Start: 2024-12-30 | End: 2024-12-31

## 2024-12-30 RX ORDER — TRAZODONE HYDROCHLORIDE 150 MG/1
25 TABLET ORAL AT BEDTIME
Refills: 0 | Status: DISCONTINUED | OUTPATIENT
Start: 2024-12-30 | End: 2025-01-04

## 2024-12-30 RX ORDER — TRAZODONE HYDROCHLORIDE 150 MG/1
37.5 TABLET ORAL AT BEDTIME
Refills: 0 | Status: DISCONTINUED | OUTPATIENT
Start: 2024-12-30 | End: 2024-12-30

## 2024-12-30 RX ORDER — VALSARTAN 80 MG/1
160 TABLET ORAL
Refills: 0 | Status: DISCONTINUED | OUTPATIENT
Start: 2024-12-30 | End: 2024-12-30

## 2024-12-30 RX ADMIN — ACETAMINOPHEN 1000 MILLIGRAM(S): 80 SOLUTION/ DROPS ORAL at 22:15

## 2024-12-30 RX ADMIN — TRAZODONE HYDROCHLORIDE 25 MILLIGRAM(S): 150 TABLET ORAL at 21:16

## 2024-12-30 RX ADMIN — HYDROXYCHLOROQUINE SULFATE 200 MILLIGRAM(S): 200 TABLET ORAL at 05:07

## 2024-12-30 RX ADMIN — ATORVASTATIN CALCIUM 80 MILLIGRAM(S): 40 TABLET, FILM COATED ORAL at 21:16

## 2024-12-30 RX ADMIN — Medication 500 MILLIGRAM(S): at 12:44

## 2024-12-30 RX ADMIN — SENNOSIDES 2 TABLET(S): 8.6 TABLET, FILM COATED ORAL at 21:16

## 2024-12-30 RX ADMIN — ACETAMINOPHEN 1000 MILLIGRAM(S): 80 SOLUTION/ DROPS ORAL at 21:15

## 2024-12-30 RX ADMIN — VALSARTAN 160 MILLIGRAM(S): 80 TABLET ORAL at 19:19

## 2024-12-30 RX ADMIN — PANTOPRAZOLE 40 MILLIGRAM(S): 40 TABLET, DELAYED RELEASE ORAL at 05:07

## 2024-12-30 RX ADMIN — Medication 500 MILLIGRAM(S): at 21:16

## 2024-12-30 RX ADMIN — DULOXETINE HYDROCHLORIDE 30 MILLIGRAM(S): 30 CAPSULE, DELAYED RELEASE ORAL at 12:45

## 2024-12-30 RX ADMIN — HYDROXYCHLOROQUINE SULFATE 200 MILLIGRAM(S): 200 TABLET ORAL at 19:18

## 2024-12-30 RX ADMIN — Medication 500 MILLIGRAM(S): at 05:07

## 2024-12-30 RX ADMIN — ACETAMINOPHEN 1000 MILLIGRAM(S): 80 SOLUTION/ DROPS ORAL at 05:07

## 2024-12-30 RX ADMIN — ACETAMINOPHEN 1000 MILLIGRAM(S): 80 SOLUTION/ DROPS ORAL at 12:44

## 2024-12-30 RX ADMIN — DILTIAZEM HYDROCHLORIDE 120 MILLIGRAM(S): 300 CAPSULE, COATED, EXTENDED RELEASE ORAL at 05:07

## 2024-12-30 NOTE — OCCUPATIONAL THERAPY INITIAL EVALUATION ADULT - ADDITIONAL COMMENTS
Pt lives alone in a [place] home with 2STE. Pt reports that prior to admission, pt was independent in all ADLs and IADLs, and ambulates with no device inside the home, straight cane outside the home. Pt has a walk-in shower with shower chair and grab bars. Pt is R hand dominant however has a history of bilateral rotator cuff tears.

## 2024-12-30 NOTE — OCCUPATIONAL THERAPY INITIAL EVALUATION ADULT - MODIFIED CLINICAL TEST OF SENSORY INTEGRATION IN BALANCE TEST
Pt performed functional mobility with RW to/from bathroom with RW and CGA.  Trendelenburg gait noted, with L hip circumduction noted during ambulation.

## 2024-12-30 NOTE — OCCUPATIONAL THERAPY INITIAL EVALUATION ADULT - MD ORDER
Pt is POD#7 s/p WINIFRED, Exploration of Fusion, Extension of Fusion to T1 with Tethers to T7 performed on 12/23/24.

## 2024-12-30 NOTE — OCCUPATIONAL THERAPY INITIAL EVALUATION ADULT - PHYSICAL ASSIST/NONPHYSICAL ASSIST:DRESS LOWER BODY, OT EVAL
educated on figure four technique, donned b/l socks and b/l shoes seated EOB/verbal cues/nonverbal cues (demo/gestures)/1 person assist

## 2024-12-30 NOTE — OCCUPATIONAL THERAPY INITIAL EVALUATION ADULT - GENERAL OBSERVATIONS, REHAB EVAL
OT IE Completed. Pt's KAVON Light cleared pt for therapy. Pt received semisupine in bed, +tele, +heplock, +spinal dressing C/D/I, room air, NAD, agreeable to OT. Pt tolerated session well. Pt left as found, all lines in tact, needs in reach, +call whyte. KAVON Light aware.

## 2024-12-30 NOTE — PROGRESS NOTE ADULT - ASSESSMENT
84 year old female with a pmh of HTN, CAD, HLD, CKD stage 3, Erosive osteoarthritis, asthma and anxiety who is known to our orthopedic surgery with recent admission and spinal surgery in Nov. 2024, now returns to [facility] with severe back pain and is pending OR on Monday for removal of hardware, exploration of fusion and extension of fusion to C7 (C7 to T3).    # back pain  - s/p WINIFRED, Exploration of fusion, extension of fusion to T1 w/ Tethers to C7 by Dr. TONO Salguero on 12-23  - pain regimen per primary team  - ensure bowel regimen  - can offer lidocaine patch   - can offer melatonin at night    #Fecal impaction   -NG placed on OR to LIWS  -Empty rectal vault on fecal disimpaction    -NGT removed 12/25 12/27: abd distended but having BMs and passing flatus. Xray abd ordered, informed no SBO on prelim read.   Diet advanced   Call gen surg to evaluate if vomiting/worsening distention or abd pain develops.  patient had 4 BMs today and is passing flatus   If continued diarrhea, r/o GI infection with GI pcr before imodium.     #CAD  - no history of MI or stents  - EKG nonischemic  - continue statin     # LA enlargement  Seen on echo in last admission.  - TTE showed left atrial dilation but normal EF and no significant valvular disease  - appears euvolemic  - continue with outpatient follow up with cardiology  - monitor for orthostatic symptoms    #HTN  - on home Valsartan 160 BID: can resume with hold parameters   - on diltiazem 120 mg at home - continue     #Asthma  - clear lungs, no acute exacerbation - albuterol inhaler PRN    #Erosive OA  - resume home dose of plaquenil     #CKD stage 3  - Cr stable  - avoid nephrotoxins  - monitor renal function and electrolytes    # Acute blood loss anemia  # chronic iron deficiency anemia  - s/p 1 prbc; Hb stable today ; will continue to monitor   - trend CBC  - maintain active T&S, two large bore IVs  - transfuse for Hgb <7     SCD for DVT ppx as per ortho

## 2024-12-30 NOTE — PROGRESS NOTE ADULT - SUBJECTIVE AND OBJECTIVE BOX
24HR EVENTS:     SUBJECTIVE: Pt seen and examined on morning rounds. Pain well controlled. Patient denies CP/SOB/N/V. Urinating without issue.      Vital Signs Last 24 Hrs  T(C): 36.8 (30 Dec 2024 05:00), Max: 36.8 (29 Dec 2024 13:03)  T(F): 98.2 (30 Dec 2024 05:00), Max: 98.3 (29 Dec 2024 13:03)  HR: 83 (30 Dec 2024 05:00) (79 - 84)  BP: 160/74 (30 Dec 2024 05:00) (139/65 - 160/74)  BP(mean): --  RR: 18 (30 Dec 2024 05:00) (16 - 18)  SpO2: 96% (30 Dec 2024 05:00) (96% - 98%)    Parameters below as of 30 Dec 2024 05:00  Patient On (Oxygen Delivery Method): room air        Physical Exam:  General: NAD, resting comfortably in bed    RLE:  SILT L2-S1, symmetric  Motor 5/5 L2-S1, symmetric  Pulses 2+    LLE:  SILT L2-S1, symmetric  Motor 5/5 L2-S1, symmetric  Pulses 2+      Assessment/Plan:  84yF s/p WINIFRED, Exploration of fusion, extension of fusion to T1 w/ Tethers to C7 by Dr. TONO Salguero on 12-23  - Weight Bearing Status: WBAT  - Pain control  - DVT PPx: SCDs  - PT rec: [facility]  - F/u AM labs  - Dispo: [facility] pending placement and drain    Jacob Lopez, PGY-1  Orthopedic Surgery

## 2024-12-30 NOTE — OCCUPATIONAL THERAPY INITIAL EVALUATION ADULT - RANGE OF MOTION EXAMINATION, UPPER EXTREMITY
BUE shoulder flexion significantly impaired due to hx of b/l rotator cuff tears. b/l elbow flexion/extension AROM WFL.

## 2024-12-30 NOTE — PROGRESS NOTE ADULT - SUBJECTIVE AND OBJECTIVE BOX
Ortho Note    Pt seen and examined at bedside during AM rounds. Patient eager to leave hospital. Back pain controlled on current medications.   Denies CP, SOB, N/V, new numbness/tingling     Vital Signs Last 24 Hrs  Vital Signs Last 24 Hrs  T(C): 36.9 (30 Dec 2024 08:19), Max: 36.9 (30 Dec 2024 08:19)  T(F): 98.4 (30 Dec 2024 08:19), Max: 98.4 (30 Dec 2024 08:19)  HR: 84 (30 Dec 2024 08:19) (82 - 84)  BP: 160/74 (30 Dec 2024 05:00) (139/65 - 160/74)  BP(mean): --  RR: 18 (30 Dec 2024 05:00) (16 - 18)  SpO2: 96% (30 Dec 2024 05:00) (96% - 98%)    Parameters below as of 30 Dec 2024 05:00  Patient On (Oxygen Delivery Method): room air      29 Dec 2024 07:01  -  30 Dec 2024 07:00  --------------------------------------------------------  IN: 0 mL / OUT: 512 mL / NET: -512 mL        General: Pt Alert and oriented, NAD  DSG C/D/I-   Pulses:  Sensation:  Motor:   EHL/FHL/TA/GS                          8.2    7.37  )-----------( 244      ( 30 Dec 2024 05:30 )             27.2     12-30    142  |  111[H]  |  7   ----------------------------<  108[H]  4.5   |  19[L]  |  0.76    Ca    8.8      30 Dec 2024 05:30        A/P: 84yFemale s/p   - Stable  - Pain Control  - DVT ppx:  - PT, WBS:     Ortho Pager [phone] Ortho Note    Pt seen and examined at bedside during AM rounds. Patient eager to leave hospital. Back pain controlled on current medications. Tolerating Po diet. Voiding freely. States she has been walking the hallways with PT. Last BM yesterday.   Denies CP, SOB, N/V, new numbness/tingling, headache, dizziness.     Vital Signs Last 24 Hrs  Vital Signs Last 24 Hrs  T(C): 36.9 (30 Dec 2024 08:19), Max: 36.9 (30 Dec 2024 08:19)  T(F): 98.4 (30 Dec 2024 08:19), Max: 98.4 (30 Dec 2024 08:19)  HR: 84 (30 Dec 2024 08:19) (82 - 84)  BP: 160/74 (30 Dec 2024 05:00) (139/65 - 160/74)  BP(mean): --  RR: 18 (30 Dec 2024 05:00) (16 - 18)  SpO2: 96% (30 Dec 2024 05:00) (96% - 98%)    Parameters below as of 30 Dec 2024 05:00  Patient On (Oxygen Delivery Method): room air      29 Dec 2024 07:01  -  30 Dec 2024 07:00  --------------------------------------------------------  IN: 0 mL / OUT: 512 mL / NET: -512 mL        General: Pt Alert and oriented, NAD  DSG C/D/I- gauze and paper tape, HV x 1 holding suction  Pulses: 2+ DP bilaterally  Sensation: SILT distally bilateral lower extremities  Motor:   EHL/FHL/TA/GS: 5/5 bilaterally                          8.2    7.37  )-----------( 244      ( 30 Dec 2024 05:30 )             27.2     12-30    142  |  111[H]  |  7   ----------------------------<  108[H]  4.5   |  19[L]  |  0.76    Ca    8.8      30 Dec 2024 05:30        A/P: 84yFemale POD#7 s/p WINIFRED, Exploration of fusion, extension of fusion to T1 w/ Tethers to C7 by Dr. Salguero on 12/23  - Afebrile, labs and vitals reviewed  - Appreciate medicine recs  - Pain Control  - Trazadone dose decreased per patient's request: takes 1/2 tab of 50mg po trazadone at bedtime   - Continue with HV: 50cc overnight, will recheck drain at 12 pm  - DVT ppx: SCDs  - PT, WBS: WBAT  - Dispo: pending acceptances to [facility]     Ortho Pager [phone]

## 2024-12-30 NOTE — OCCUPATIONAL THERAPY INITIAL EVALUATION ADULT - PERTINENT HX OF CURRENT PROBLEM, REHAB EVAL
84 year old female with a pmh of HTN, CAD, HLD, CKD stage 3, Erosive osteoarthritis, asthma and anxiety who is known to our orthopedic surgery with recent admission and spinal surgery in Nov. 2024, now returns to [facility] with severe back pain and is pending OR on Monday for removal of hardware, exploration of fusion and extension of fusion to C7 (C7 to T3).  Medicine is consulted for comanagement.

## 2024-12-30 NOTE — OCCUPATIONAL THERAPY INITIAL EVALUATION ADULT - MANUAL MUSCLE TESTING RESULTS, REHAB EVAL
R shoulder flexion 2+/5, R elbow flexion/extension 3+/5, R  strength 4-/5. L shoulder flexion 2-/5, L elbow flexion/extension 3/5, L  strength 3+/5. BLE grossly greater than or equal to 3+/5 throughout.

## 2024-12-30 NOTE — PROGRESS NOTE ADULT - SUBJECTIVE AND OBJECTIVE BOX
Patient is a 84y old  Female who presents with a chief complaint of back pain (30 Dec 2024 11:52)        SUBJECTIVE:  Patient was seen and examined at bedside, no acute complaints    Overnight Events : NAEON    Last Bowel Movement: 28-Dec-2024 (12-30)  Last Bowel Movement: 29-Dec-2024 (12-29)  Last Bowel Movement: 28-Dec-2024 (12-29)  Last Bowel Movement: 28-Dec-2024 (12-28)  Last Bowel Movement: 27-Dec-2024 (12-28)  Last Bowel Movement: 27-Dec-2024 (12-27)  Last Bowel Movement: 27-Dec-2024 (12-27)  Last Bowel Movement: 26-Dec-2024 (12-26)  Last Bowel Movement: 25-Dec-2024 (12-26)  Last Bowel Movement: 25-Dec-2024 (12-25)  Last Bowel Movement: 24-Dec-2024 (12-25)  Last Bowel Movement: 24-Dec-2024 (12-24)  Last Bowel Movement: 22-Dec-2024 (12-23)      Review of systems: 12 point Review of systems negative unless otherwise documented elsewhere in note.     Diet, Regular (12-27-24 @ 13:17) [Active]      MEDICATIONS:  MEDICATIONS  (STANDING):  acetaminophen     Tablet .. 1000 milliGRAM(s) Oral every 8 hours  atorvastatin 80 milliGRAM(s) Oral at bedtime  diltiazem    milliGRAM(s) Oral daily  DULoxetine 30 milliGRAM(s) Oral daily  hydroxychloroquine 200 milliGRAM(s) Oral two times a day  methocarbamol 500 milliGRAM(s) Oral every 8 hours  pantoprazole    Tablet 40 milliGRAM(s) Oral before breakfast  polyethylene glycol 3350 17 Gram(s) Oral at bedtime  senna 2 Tablet(s) Oral at bedtime    MEDICATIONS  (PRN):  albuterol    90 MICROgram(s) HFA Inhaler 2 Puff(s) Inhalation every 6 hours PRN PRN  HYDROmorphone  Injectable 0.5 milliGRAM(s) IV Push every 4 hours PRN breakthrough pain on floor  HYDROmorphone  Injectable 0.5 milliGRAM(s) IV Push every 15 minutes PRN breakthrough pain in PACU  loperamide 2 milliGRAM(s) Oral every 12 hours PRN Diarrhea  melatonin 5 milliGRAM(s) Oral at bedtime PRN Sleep  ondansetron   Disintegrating Tablet 4 milliGRAM(s) Oral every 6 hours PRN Nausea and/or Vomiting  oxyCODONE    IR 10 milliGRAM(s) Oral every 4 hours PRN Severe Pain (7 - 10)  oxyCODONE    IR 5 milliGRAM(s) Oral every 4 hours PRN Moderate Pain (4 - 6)  simethicone 80 milliGRAM(s) Chew three times a day PRN Gas  traZODone 25 milliGRAM(s) Oral at bedtime PRN insomnia      Allergies    venlafaxine (Unknown)  penicillin (Unknown)    Intolerances        OBJECTIVE:  Vital Signs Last 24 Hrs  T(C): 36.9 (30 Dec 2024 08:19), Max: 36.9 (30 Dec 2024 08:19)  T(F): 98.4 (30 Dec 2024 08:19), Max: 98.4 (30 Dec 2024 08:19)  HR: 84 (30 Dec 2024 08:19) (82 - 84)  BP: 160/74 (30 Dec 2024 05:00) (142/64 - 160/74)  BP(mean): --  RR: 18 (30 Dec 2024 05:00) (16 - 18)  SpO2: 96% (30 Dec 2024 05:00) (96% - 98%)    Parameters below as of 30 Dec 2024 05:00  Patient On (Oxygen Delivery Method): room air      I&O's Summary    29 Dec 2024 07:01  -  30 Dec 2024 07:00  --------------------------------------------------------  IN: 0 mL / OUT: 512 mL / NET: -512 mL    30 Dec 2024 07:01  -  30 Dec 2024 13:26  --------------------------------------------------------  IN: 0 mL / OUT: 430 mL / NET: -430 mL        PHYSICAL EXAM:  Gen: Resting in bed at time of exam, not in distress   CV: +S1/S2  Pulm: no wheezing , no crackles  no increase in work of breathing  Abd: soft, NTND  Skin: warm and dry, no new rashes   Ext: moving all 4 extremities spontaneously , no edema  ,  Neuro: AOx3, no gross focal neurological deficits  Psych: affect and behavior appropriate, pleasant at time of interview    LABS:                        8.2    7.37  )-----------( 244      ( 30 Dec 2024 05:30 )             27.2     12-30    142  |  111[H]  |  7   ----------------------------<  108[H]  4.5   |  19[L]  |  0.76    Ca    8.8      30 Dec 2024 05:30          CAPILLARY BLOOD GLUCOSE        Urinalysis Basic - ( 30 Dec 2024 05:30 )    Color: x / Appearance: x / SG: x / pH: x  Gluc: 108 mg/dL / Ketone: x  / Bili: x / Urobili: x   Blood: x / Protein: x / Nitrite: x   Leuk Esterase: x / RBC: x / WBC x   Sq Epi: x / Non Sq Epi: x / Bacteria: x

## 2024-12-30 NOTE — OCCUPATIONAL THERAPY INITIAL EVALUATION ADULT - DIAGNOSIS, OT EVAL
Pt presents with ataxic gait, impaired LLE coordination, decreased postural control, and impaired functional endurance impacting their ability to independently complete ADLs, functional transfers, and functional mobility.

## 2024-12-31 PROCEDURE — 99233 SBSQ HOSP IP/OBS HIGH 50: CPT

## 2024-12-31 RX ORDER — VALSARTAN 80 MG/1
160 TABLET ORAL DAILY
Refills: 0 | Status: DISCONTINUED | OUTPATIENT
Start: 2024-12-31 | End: 2024-12-31

## 2024-12-31 RX ORDER — POLYETHYLENE GLYCOL 3350 17 G/DOSE
17 POWDER (GRAM) ORAL DAILY
Refills: 0 | Status: DISCONTINUED | OUTPATIENT
Start: 2024-12-31 | End: 2025-01-04

## 2024-12-31 RX ORDER — VALSARTAN 80 MG/1
160 TABLET ORAL
Refills: 0 | Status: DISCONTINUED | OUTPATIENT
Start: 2024-12-31 | End: 2025-01-04

## 2024-12-31 RX ADMIN — ACETAMINOPHEN 1000 MILLIGRAM(S): 80 SOLUTION/ DROPS ORAL at 05:42

## 2024-12-31 RX ADMIN — PANTOPRAZOLE 40 MILLIGRAM(S): 40 TABLET, DELAYED RELEASE ORAL at 05:42

## 2024-12-31 RX ADMIN — Medication 500 MILLIGRAM(S): at 21:10

## 2024-12-31 RX ADMIN — HYDROXYCHLOROQUINE SULFATE 200 MILLIGRAM(S): 200 TABLET ORAL at 05:42

## 2024-12-31 RX ADMIN — ACETAMINOPHEN 1000 MILLIGRAM(S): 80 SOLUTION/ DROPS ORAL at 21:10

## 2024-12-31 RX ADMIN — VALSARTAN 160 MILLIGRAM(S): 80 TABLET ORAL at 14:05

## 2024-12-31 RX ADMIN — HYDROXYCHLOROQUINE SULFATE 200 MILLIGRAM(S): 200 TABLET ORAL at 17:34

## 2024-12-31 RX ADMIN — ATORVASTATIN CALCIUM 80 MILLIGRAM(S): 40 TABLET, FILM COATED ORAL at 21:10

## 2024-12-31 RX ADMIN — DULOXETINE HYDROCHLORIDE 30 MILLIGRAM(S): 30 CAPSULE, DELAYED RELEASE ORAL at 14:04

## 2024-12-31 RX ADMIN — ACETAMINOPHEN 1000 MILLIGRAM(S): 80 SOLUTION/ DROPS ORAL at 14:05

## 2024-12-31 RX ADMIN — ACETAMINOPHEN 1000 MILLIGRAM(S): 80 SOLUTION/ DROPS ORAL at 22:10

## 2024-12-31 RX ADMIN — Medication 500 MILLIGRAM(S): at 14:04

## 2024-12-31 RX ADMIN — VALSARTAN 160 MILLIGRAM(S): 80 TABLET ORAL at 21:16

## 2024-12-31 RX ADMIN — DILTIAZEM HYDROCHLORIDE 120 MILLIGRAM(S): 300 CAPSULE, COATED, EXTENDED RELEASE ORAL at 05:43

## 2024-12-31 RX ADMIN — Medication 500 MILLIGRAM(S): at 05:42

## 2024-12-31 RX ADMIN — Medication 17 GRAM(S): at 07:51

## 2024-12-31 NOTE — PROGRESS NOTE ADULT - SUBJECTIVE AND OBJECTIVE BOX
Patient is a 84y old  Female who presents with a chief complaint of back pain (31 Dec 2024 09:33)        SUBJECTIVE:  Patient was seen and examined at bedside, no acute complaints, denies headaches, blurry vision, CP, SOB.    Overnight Events : NAEON    Last Bowel Movement: 30-Dec-2024 (12-30)  Last Bowel Movement: 28-Dec-2024 (12-30)  Last Bowel Movement: 29-Dec-2024 (12-29)  Last Bowel Movement: 28-Dec-2024 (12-29)  Last Bowel Movement: 28-Dec-2024 (12-28)  Last Bowel Movement: 27-Dec-2024 (12-28)  Last Bowel Movement: 27-Dec-2024 (12-27)  Last Bowel Movement: 27-Dec-2024 (12-27)  Last Bowel Movement: 26-Dec-2024 (12-26)  Last Bowel Movement: 25-Dec-2024 (12-26)  Last Bowel Movement: 25-Dec-2024 (12-25)  Last Bowel Movement: 24-Dec-2024 (12-25)  Last Bowel Movement: 24-Dec-2024 (12-24)      Review of systems: 12 point Review of systems negative unless otherwise documented elsewhere in note.     Diet, Regular (12-27-24 @ 13:17) [Active]      MEDICATIONS:  MEDICATIONS  (STANDING):  acetaminophen     Tablet .. 1000 milliGRAM(s) Oral every 8 hours  atorvastatin 80 milliGRAM(s) Oral at bedtime  diltiazem    milliGRAM(s) Oral daily  DULoxetine 30 milliGRAM(s) Oral daily  hydroxychloroquine 200 milliGRAM(s) Oral two times a day  methocarbamol 500 milliGRAM(s) Oral every 8 hours  pantoprazole    Tablet 40 milliGRAM(s) Oral before breakfast  polyethylene glycol 3350 17 Gram(s) Oral daily  senna 2 Tablet(s) Oral at bedtime  valsartan 160 milliGRAM(s) Oral daily    MEDICATIONS  (PRN):  albuterol    90 MICROgram(s) HFA Inhaler 2 Puff(s) Inhalation every 6 hours PRN PRN  loperamide 2 milliGRAM(s) Oral every 12 hours PRN Diarrhea  melatonin 5 milliGRAM(s) Oral at bedtime PRN Sleep  ondansetron   Disintegrating Tablet 4 milliGRAM(s) Oral every 6 hours PRN Nausea and/or Vomiting  oxyCODONE    IR 5 milliGRAM(s) Oral every 4 hours PRN Moderate Pain (4 - 6)  oxyCODONE    IR 10 milliGRAM(s) Oral every 4 hours PRN Severe Pain (7 - 10)  simethicone 80 milliGRAM(s) Chew three times a day PRN Gas  traZODone 25 milliGRAM(s) Oral at bedtime PRN insomnia      Allergies    venlafaxine (Unknown)  penicillin (Unknown)    Intolerances        OBJECTIVE:  Vital Signs Last 24 Hrs  T(C): 36.6 (31 Dec 2024 09:00), Max: 36.8 (30 Dec 2024 14:15)  T(F): 97.9 (31 Dec 2024 09:00), Max: 98.3 (31 Dec 2024 00:25)  HR: 107 (31 Dec 2024 09:00) (82 - 107)  BP: 169/74 (31 Dec 2024 09:00) (154/76 - 178/74)  BP(mean): --  RR: 18 (31 Dec 2024 09:00) (18 - 20)  SpO2: 99% (31 Dec 2024 09:00) (98% - 99%)    Parameters below as of 31 Dec 2024 09:00  Patient On (Oxygen Delivery Method): room air      I&O's Summary    30 Dec 2024 07:01  -  31 Dec 2024 07:00  --------------------------------------------------------  IN: 240 mL / OUT: 430 mL / NET: -190 mL        PHYSICAL EXAM:  Gen: Resting in bed at time of exam, not in distress   CV: RRR, +S1/S2  Pulm: no wheezing , no crackles  no increase in work of breathing  Abd: soft, NTND  Skin: warm and dry, no new rashes   Ext: moving all 4 extremities spontaneously , no edema  ,  Neuro: AOx3, no gross focal neurological deficits  Psych: affect and behavior appropriate, pleasant at time of interview    LABS:                        8.2    7.37  )-----------( 244      ( 30 Dec 2024 05:30 )             27.2     12-30    142  |  111[H]  |  7   ----------------------------<  108[H]  4.5   |  19[L]  |  0.76    Ca    8.8      30 Dec 2024 05:30          CAPILLARY BLOOD GLUCOSE        Urinalysis Basic - ( 30 Dec 2024 05:30 )    Color: x / Appearance: x / SG: x / pH: x  Gluc: 108 mg/dL / Ketone: x  / Bili: x / Urobili: x   Blood: x / Protein: x / Nitrite: x   Leuk Esterase: x / RBC: x / WBC x   Sq Epi: x / Non Sq Epi: x / Bacteria: x        MICRODATA:      RADIOLOGY/OTHER STUDIES:

## 2024-12-31 NOTE — PROGRESS NOTE ADULT - SUBJECTIVE AND OBJECTIVE BOX
Ortho Note    Patient seen and examined at bedside on AM rounds. Pt comfortable without complaints, pain well controlled. Denies F/chills, CP, SOB, N/V, numbness/tingling extremities.     Vital Signs Last 24 Hrs  T(C): 36.6 (12-31-24 @ 09:00), Max: 36.8 (12-31-24 @ 05:27)  T(F): 97.9 (12-31-24 @ 09:00), Max: 98.2 (12-31-24 @ 05:27)  HR: 107 (12-31-24 @ 09:00) (87 - 107)  BP: 169/74 (12-31-24 @ 09:00) (161/83 - 178/74)  BP(mean): --  RR: 18 (12-31-24 @ 09:00) (18 - 20)  SpO2: 99% (12-31-24 @ 09:00) (98% - 99%)  I&O's Summary    30 Dec 2024 07:01  -  31 Dec 2024 07:00  --------------------------------------------------------  IN: 240 mL / OUT: 430 mL / NET: -190 mL        General: Pt Alert and oriented, NAD  DSG to back: gauze, paper tape C/D/I  Pulses: 2+ DP / Radial pulses palpable bilaterally, skin wwp, cap refill brisk, no calf tenderness bilaterally  Sensation: Intact to light touch to the b/l upper and lower extremities  Motor:  EHL/FHL/TA/GS 5/5 bilaterally  Ulnar/Radial/Median nerves intact bilaterally, /Biceps/Tricpes 5/5 bilaterally                          8.2    7.37  )-----------( 244      ( 30 Dec 2024 05:30 )             27.2     12-30    142  |  111[H]  |  7   ----------------------------<  108[H]  4.5   |  19[L]  |  0.76    Ca    8.8      30 Dec 2024 05:30        A/P: 83 y/o Female POD#8 s/p extension of posterior spinal fusion to T1 with tethers with Dr. Salguero   - Stable  - Pain Control  - OOB/IS  - Bowel Regimen  - DVT ppx: b/l SCDs  - PT, WBS: WBAT  - Dispo: [facility], pending authorization    Ortho Pager: [phone] Ortho Note    Patient seen and examined at bedside on AM rounds. Pt comfortable without complaints, pain well controlled. Denies F/chills, CP, SOB, N/V, numbness/tingling extremities. Reports she has been ambulating with PT.    Vital Signs Last 24 Hrs  T(C): 36.6 (12-31-24 @ 09:00), Max: 36.8 (12-31-24 @ 05:27)  T(F): 97.9 (12-31-24 @ 09:00), Max: 98.2 (12-31-24 @ 05:27)  HR: 107 (12-31-24 @ 09:00) (87 - 107)  BP: 169/74 (12-31-24 @ 09:00) (161/83 - 178/74)  BP(mean): --  RR: 18 (12-31-24 @ 09:00) (18 - 20)  SpO2: 99% (12-31-24 @ 09:00) (98% - 99%)  I&O's Summary    30 Dec 2024 07:01  -  31 Dec 2024 07:00  --------------------------------------------------------  IN: 240 mL / OUT: 430 mL / NET: -190 mL        General: Pt Alert and oriented, NAD  DSG to back: gauze, paper tape C/D/I  Abdomen: Soft NTND  Pulses: 2+ DP / Radial pulses palpable bilaterally, skin wwp, cap refill brisk, no calf tenderness bilaterally  Sensation: Intact to light touch to the b/l upper and lower extremities  Motor:  EHL/FHL/TA/GS 5/5 bilaterally  Ulnar/Radial/Median nerves intact bilaterally, /Biceps/Tricpes 5/5 bilaterally                          8.2    7.37  )-----------( 244      ( 30 Dec 2024 05:30 )             27.2     12-30    142  |  111[H]  |  7   ----------------------------<  108[H]  4.5   |  19[L]  |  0.76    Ca    8.8      30 Dec 2024 05:30        A/P: 83 y/o Female POD#8 s/p extension of posterior spinal fusion to T1 with tethers with Dr. Salguero   - Albert  - Pain Control  - OOB/IS  - Bowel Regimen  - DVT ppx: b/l SCDs  - PT, WBS: WBAT  - Dispo: [facility], pending authorization    Ortho Pager: [phone] Ortho Note    Patient seen and examined at bedside on AM rounds. Pt comfortable without complaints, pain well controlled. Denies F/chills, CP, SOB, N/V, numbness/tingling extremities. Reports she has been ambulating with PT.    Vital Signs Last 24 Hrs  T(C): 36.6 (12-31-24 @ 09:00), Max: 36.8 (12-31-24 @ 05:27)  T(F): 97.9 (12-31-24 @ 09:00), Max: 98.2 (12-31-24 @ 05:27)  HR: 107 (12-31-24 @ 09:00) (87 - 107)  BP: 169/74 (12-31-24 @ 09:00) (161/83 - 178/74)  BP(mean): --  RR: 18 (12-31-24 @ 09:00) (18 - 20)  SpO2: 99% (12-31-24 @ 09:00) (98% - 99%)  I&O's Summary    30 Dec 2024 07:01  -  31 Dec 2024 07:00  --------------------------------------------------------  IN: 240 mL / OUT: 430 mL / NET: -190 mL        General: Pt Alert and oriented, NAD  DSG to back: gauze, paper tape C/D/I  Abdomen: Soft NTND  Pulses: 2+ DP / Radial pulses palpable bilaterally, skin wwp, cap refill brisk, no calf tenderness bilaterally  Sensation: Intact to light touch to the b/l upper and lower extremities  Motor:  EHL/FHL/TA/GS 5/5 bilaterally  Ulnar/Radial/Median nerves intact bilaterally, /Biceps/Tricpes 5/5 bilaterally                          8.2    7.37  )-----------( 244      ( 30 Dec 2024 05:30 )             27.2     12-30    142  |  111[H]  |  7   ----------------------------<  108[H]  4.5   |  19[L]  |  0.76    Ca    8.8      30 Dec 2024 05:30        A/P: 85 y/o Female POD#8 s/p extension of posterior spinal fusion to T1 with tethers with Dr. Salguero   - Stable  - Pain Control  - OOB/IS  - Bowel Regimen  - DVT ppx: b/l SCDs  - PT, WBS: WBAT  - [facility] d/c'ed yesterday  - Dispo: [facility], pending authorization    Ortho Pager: [phone] Ortho Note    Patient seen and examined at bedside on AM rounds. Pt comfortable without complaints, pain well controlled. Denies F/chills, CP, SOB, N/V, numbness/tingling extremities. Reports she has been ambulating with PT.    Vital Signs Last 24 Hrs  T(C): 36.6 (12-31-24 @ 09:00), Max: 36.8 (12-31-24 @ 05:27)  T(F): 97.9 (12-31-24 @ 09:00), Max: 98.2 (12-31-24 @ 05:27)  HR: 107 (12-31-24 @ 09:00) (87 - 107)  BP: 169/74 (12-31-24 @ 09:00) (161/83 - 178/74)  BP(mean): --  RR: 18 (12-31-24 @ 09:00) (18 - 20)  SpO2: 99% (12-31-24 @ 09:00) (98% - 99%)  I&O's Summary    30 Dec 2024 07:01  -  31 Dec 2024 07:00  --------------------------------------------------------  IN: 240 mL / OUT: 430 mL / NET: -190 mL        General: Pt Alert and oriented, NAD  DSG to back: gauze, paper tape C/D/I  Abdomen: Soft NTND  Pulses: 2+ DP / Radial pulses palpable bilaterally, skin wwp, cap refill brisk, no calf tenderness bilaterally  Sensation: Intact to light touch to the b/l upper and lower extremities  Motor:  EHL/FHL/TA/GS 5/5 bilaterally  Ulnar/Radial/Median nerves intact bilaterally, /Biceps/Tricpes 5/5 bilaterally                          8.2    7.37  )-----------( 244      ( 30 Dec 2024 05:30 )             27.2     12-30    142  |  111[H]  |  7   ----------------------------<  108[H]  4.5   |  19[L]  |  0.76    Ca    8.8      30 Dec 2024 05:30        A/P: 83 y/o Female POD#8 s/p extension of posterior spinal fusion to T1 with tethers with Dr. Salguero   - Albert  - Appreciate medicine recommendations  - Pain Control  - OOB/IS  - Bowel Regimen  - DVT ppx: b/l SCDs  - PT, WBS: WBAT  - [facility] d/c'ed yesterday  - Dispo: [facility], pending authorization    Ortho Pager: [phone]

## 2024-12-31 NOTE — PROGRESS NOTE ADULT - SUBJECTIVE AND OBJECTIVE BOX
24HR EVENTS:     SUBJECTIVE: Pt seen and examined on morning rounds. Pain well controlled. Patient denies CP/SOB/N/V. Urinating without issue.      Vital Signs Last 24 Hrs  T(C): 36.8 (31 Dec 2024 05:27), Max: 36.9 (30 Dec 2024 08:19)  T(F): 98.2 (31 Dec 2024 05:27), Max: 98.4 (30 Dec 2024 08:19)  HR: 91 (31 Dec 2024 05:27) (82 - 93)  BP: 178/74 (31 Dec 2024 05:27) (154/76 - 178/74)  BP(mean): --  RR: 20 (31 Dec 2024 05:27) (18 - 20)  SpO2: 98% (31 Dec 2024 05:27) (98% - 99%)    Parameters below as of 31 Dec 2024 05:27  Patient On (Oxygen Delivery Method): room air        Physical Exam:  General: NAD, resting comfortably in bed    RLE:  SILT L2-S1, symmetric  Motor 5/5 L2-S1, symmetric  Pulses 2+    LLE:  SILT L2-S1, symmetric  Motor 5/5 L2-S1, symmetric  Pulses 2+      Assessment/Plan:  84yF s/p WINIFRED, Exploration of fusion, extension of fusion to T1 w/ Tethers to C7 by Dr. TONO Salguero on 12-23  - Weight Bearing Status: WBAT  - Pain control  - DVT PPx: SCDs  - PT rec: [facility]  - F/u AM labs  - Dispo: [facility] pending placement    Jacob Lopez, PGY-1  Orthopedic Surgery

## 2025-01-01 LAB
HCT VFR BLD CALC: 26.6 % — LOW (ref 34.5–45)
HGB BLD-MCNC: 8.1 G/DL — LOW (ref 11.5–15.5)
MCHC RBC-ENTMCNC: 28.5 PG — SIGNIFICANT CHANGE UP (ref 27–34)
MCHC RBC-ENTMCNC: 30.5 G/DL — LOW (ref 32–36)
MCV RBC AUTO: 93.7 FL — SIGNIFICANT CHANGE UP (ref 80–100)
NRBC # BLD: 0 /100 WBCS — SIGNIFICANT CHANGE UP (ref 0–0)
PLATELET # BLD AUTO: 297 K/UL — SIGNIFICANT CHANGE UP (ref 150–400)
RBC # BLD: 2.84 M/UL — LOW (ref 3.8–5.2)
RBC # FLD: 13.9 % — SIGNIFICANT CHANGE UP (ref 10.3–14.5)
WBC # BLD: 6.35 K/UL — SIGNIFICANT CHANGE UP (ref 3.8–10.5)
WBC # FLD AUTO: 6.35 K/UL — SIGNIFICANT CHANGE UP (ref 3.8–10.5)

## 2025-01-01 PROCEDURE — 99233 SBSQ HOSP IP/OBS HIGH 50: CPT

## 2025-01-01 RX ADMIN — VALSARTAN 160 MILLIGRAM(S): 80 TABLET ORAL at 11:02

## 2025-01-01 RX ADMIN — DILTIAZEM HYDROCHLORIDE 120 MILLIGRAM(S): 300 CAPSULE, COATED, EXTENDED RELEASE ORAL at 05:44

## 2025-01-01 RX ADMIN — ATORVASTATIN CALCIUM 80 MILLIGRAM(S): 40 TABLET, FILM COATED ORAL at 21:06

## 2025-01-01 RX ADMIN — Medication 500 MILLIGRAM(S): at 21:06

## 2025-01-01 RX ADMIN — ACETAMINOPHEN 1000 MILLIGRAM(S): 80 SOLUTION/ DROPS ORAL at 13:54

## 2025-01-01 RX ADMIN — HYDROXYCHLOROQUINE SULFATE 200 MILLIGRAM(S): 200 TABLET ORAL at 05:44

## 2025-01-01 RX ADMIN — VALSARTAN 160 MILLIGRAM(S): 80 TABLET ORAL at 21:06

## 2025-01-01 RX ADMIN — DULOXETINE HYDROCHLORIDE 30 MILLIGRAM(S): 30 CAPSULE, DELAYED RELEASE ORAL at 11:02

## 2025-01-01 RX ADMIN — Medication 500 MILLIGRAM(S): at 05:44

## 2025-01-01 RX ADMIN — ACETAMINOPHEN 1000 MILLIGRAM(S): 80 SOLUTION/ DROPS ORAL at 21:06

## 2025-01-01 RX ADMIN — PANTOPRAZOLE 40 MILLIGRAM(S): 40 TABLET, DELAYED RELEASE ORAL at 05:44

## 2025-01-01 RX ADMIN — ACETAMINOPHEN 1000 MILLIGRAM(S): 80 SOLUTION/ DROPS ORAL at 05:44

## 2025-01-01 RX ADMIN — HYDROXYCHLOROQUINE SULFATE 200 MILLIGRAM(S): 200 TABLET ORAL at 17:31

## 2025-01-01 RX ADMIN — Medication 500 MILLIGRAM(S): at 13:54

## 2025-01-01 NOTE — PROGRESS NOTE ADULT - ASSESSMENT
84 year old female with a pmh of HTN, CAD, HLD, CKD stage 3, Erosive osteoarthritis, asthma and anxiety who is known to our orthopedic surgery with recent admission and spinal surgery in Nov. 2024, now returns to [facility] with severe back pain and is pending OR on Monday for removal of hardware, exploration of fusion and extension of fusion to C7 (C7 to T3).    # back pain  - s/p WINIFRED, Exploration of fusion, extension of fusion to T1 w/ Tethers to C7 by Dr. 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  -ptn having BMs  -If diarrhea, r/o GI infection with GI pcr before imodium.     #CAD  - no history of MI or stents  - EKG nonischemic  - continue statin     # LA enlargement  Seen on echo in last admission.  - TTE showed left atrial dilation but normal EF and no significant valvular disease  - appears euvolemic  - continue with outpatient follow up with cardiology  - monitor for orthostatic symptoms    #HTN  - on home Valsartan 160 BID: resuming BID dosing today  - on diltiazem 120 mg at home - continue     #Asthma  - clear lungs, no acute exacerbation - albuterol inhaler PRN    #Erosive OA  - resume home dose of plaquenil     #CKD stage 3  - Cr stable  - avoid nephrotoxins  - monitor renal function and electrolytes    # Acute blood loss anemia  # chronic iron deficiency anemia  - s/p 1 prbc; Hb stable today; will continue to monitor   - trend CBC  - maintain active T&S, two large bore IVs  - transfuse for Hgb <7     SCD for DVT ppx as per ortho

## 2025-01-01 NOTE — PROGRESS NOTE ADULT - SUBJECTIVE AND OBJECTIVE BOX
Patient is a 84y old  Female who presents with a chief complaint of back pain (01 Jan 2025 11:17)        SUBJECTIVE:  Patient was seen and examined at bedside, no acute complaints    Overnight Events : NAEON    Last Bowel Movement: 01-Jan-2025 (01-01)  Last Bowel Movement: 01-Jan-2025 (01-01)  Last Bowel Movement: 31-Dec-2024 (12-31)  Last Bowel Movement: 30-Dec-2024 (12-30)  Last Bowel Movement: 28-Dec-2024 (12-30)  Last Bowel Movement: 29-Dec-2024 (12-29)  Last Bowel Movement: 28-Dec-2024 (12-29)  Last Bowel Movement: 28-Dec-2024 (12-28)  Last Bowel Movement: 27-Dec-2024 (12-28)  Last Bowel Movement: 27-Dec-2024 (12-27)  Last Bowel Movement: 27-Dec-2024 (12-27)  Last Bowel Movement: 26-Dec-2024 (12-26)  Last Bowel Movement: 25-Dec-2024 (12-26)  Last Bowel Movement: 25-Dec-2024 (12-25)      Review of systems: 12 point Review of systems negative unless otherwise documented elsewhere in note.     Diet, Regular (12-27-24 @ 13:17) [Active]      MEDICATIONS:  MEDICATIONS  (STANDING):  acetaminophen     Tablet .. 1000 milliGRAM(s) Oral every 8 hours  atorvastatin 80 milliGRAM(s) Oral at bedtime  diltiazem    milliGRAM(s) Oral daily  DULoxetine 30 milliGRAM(s) Oral daily  hydroxychloroquine 200 milliGRAM(s) Oral two times a day  methocarbamol 500 milliGRAM(s) Oral every 8 hours  pantoprazole    Tablet 40 milliGRAM(s) Oral before breakfast  polyethylene glycol 3350 17 Gram(s) Oral daily  senna 2 Tablet(s) Oral at bedtime  valsartan 160 milliGRAM(s) Oral two times a day    MEDICATIONS  (PRN):  albuterol    90 MICROgram(s) HFA Inhaler 2 Puff(s) Inhalation every 6 hours PRN PRN  loperamide 2 milliGRAM(s) Oral every 12 hours PRN Diarrhea  melatonin 5 milliGRAM(s) Oral at bedtime PRN Sleep  ondansetron   Disintegrating Tablet 4 milliGRAM(s) Oral every 6 hours PRN Nausea and/or Vomiting  oxyCODONE    IR 5 milliGRAM(s) Oral every 4 hours PRN Moderate Pain (4 - 6)  oxyCODONE    IR 10 milliGRAM(s) Oral every 4 hours PRN Severe Pain (7 - 10)  simethicone 80 milliGRAM(s) Chew three times a day PRN Gas  traZODone 25 milliGRAM(s) Oral at bedtime PRN insomnia      Allergies    venlafaxine (Unknown)  penicillin (Unknown)    Intolerances        OBJECTIVE:  Vital Signs Last 24 Hrs  T(C): 36.9 (01 Jan 2025 13:55), Max: 37.3 (01 Jan 2025 05:43)  T(F): 98.5 (01 Jan 2025 13:55), Max: 99.2 (01 Jan 2025 05:43)  HR: 81 (01 Jan 2025 13:55) (81 - 85)  BP: 148/65 (01 Jan 2025 13:55) (148/65 - 175/74)  BP(mean): --  RR: 15 (01 Jan 2025 13:55) (14 - 18)  SpO2: 99% (01 Jan 2025 13:55) (98% - 100%)    Parameters below as of 01 Jan 2025 13:55  Patient On (Oxygen Delivery Method): room air      I&O's Summary    31 Dec 2024 07:01  -  01 Jan 2025 07:00  --------------------------------------------------------  IN: 0 mL / OUT: 1000 mL / NET: -1000 mL        PHYSICAL EXAM:  Gen: Resting in bed at time of exam, not in distress   CV: RRR, +S1/S2  Pulm: no wheezing , no crackles  no increase in work of breathing  Abd: soft, NTND  Skin: warm and dry, no new rashes   Ext: moving all 4 extremities spontaneously , no edema  ,  Neuro: AOx3, no gross focal neurological deficits  Psych: affect and behavior appropriate, pleasant at time of interview    LABS:                        8.1    6.35  )-----------( 297      ( 01 Jan 2025 07:25 )             26.6               CAPILLARY BLOOD GLUCOSE            MICRODATA:      RADIOLOGY/OTHER STUDIES:

## 2025-01-01 NOTE — PROGRESS NOTE ADULT - SUBJECTIVE AND OBJECTIVE BOX
Ortho Note    Pt comfortable without complaints, pain controlled  Denies CP, SOB, N/V, numbness/tingling     Vital Signs Last 24 Hrs  T(C): 37.1 (01-01-25 @ 08:04), Max: 37.3 (01-01-25 @ 05:43)  T(F): 98.8 (01-01-25 @ 08:04), Max: 99.2 (01-01-25 @ 05:43)  HR: 85 (01-01-25 @ 11:01) (82 - 85)  BP: 154/67 (01-01-25 @ 11:01) (154/67 - 167/71)  BP(mean): --  RR: 16 (01-01-25 @ 11:01) (15 - 18)  SpO2: 99% (01-01-25 @ 11:01) (98% - 99%)  I&O's Summary    31 Dec 2024 07:01  -  01 Jan 2025 07:00  --------------------------------------------------------  IN: 0 mL / OUT: 1000 mL / NET: -1000 mL        Physical Exam:  General: NAD, resting comfortably in bed    RLE:  SILT L2-S1, symmetric  Motor 5/5 L2-S1, symmetric  Pulses 2+    LLE:  SILT L2-S1, symmetric  Motor 5/5 L2-S1, symmetric  Pulses 2+                          8.1    6.35  )-----------( 297      ( 01 Jan 2025 07:25 )             26.6             Assessment/Plan:  84yF s/p WINIFRED, Exploration of fusion, extension of fusion to T1 w/ Tethers to C7 by Dr. TONO Salguero on 12-23  - Weight Bearing Status: WBAT  - Pain control  - DVT PPx: SCDs  - PT rec: AR  - F/u AM labs  - Dispo: AR pending placement    Ortho Pager [phone]

## 2025-01-02 PROCEDURE — 99233 SBSQ HOSP IP/OBS HIGH 50: CPT

## 2025-01-02 RX ORDER — SIMETHICONE 125 MG/1
1 CAPSULE, LIQUID FILLED ORAL
Qty: 0 | Refills: 0 | DISCHARGE
Start: 2025-01-02

## 2025-01-02 RX ORDER — DULOXETINE HYDROCHLORIDE 30 MG/1
1 CAPSULE, DELAYED RELEASE ORAL
Qty: 0 | Refills: 0 | DISCHARGE
Start: 2025-01-02

## 2025-01-02 RX ORDER — LOPERAMIDE HCL 1 MG/5 ML
1 LIQUID (ML) ORAL
Qty: 0 | Refills: 0 | DISCHARGE
Start: 2025-01-02

## 2025-01-02 RX ADMIN — ACETAMINOPHEN 1000 MILLIGRAM(S): 80 SOLUTION/ DROPS ORAL at 05:33

## 2025-01-02 RX ADMIN — PANTOPRAZOLE 40 MILLIGRAM(S): 40 TABLET, DELAYED RELEASE ORAL at 05:33

## 2025-01-02 RX ADMIN — DULOXETINE HYDROCHLORIDE 30 MILLIGRAM(S): 30 CAPSULE, DELAYED RELEASE ORAL at 11:10

## 2025-01-02 RX ADMIN — ATORVASTATIN CALCIUM 80 MILLIGRAM(S): 40 TABLET, FILM COATED ORAL at 21:59

## 2025-01-02 RX ADMIN — VALSARTAN 160 MILLIGRAM(S): 80 TABLET ORAL at 10:30

## 2025-01-02 RX ADMIN — ACETAMINOPHEN 1000 MILLIGRAM(S): 80 SOLUTION/ DROPS ORAL at 22:00

## 2025-01-02 RX ADMIN — Medication 500 MILLIGRAM(S): at 13:31

## 2025-01-02 RX ADMIN — VALSARTAN 160 MILLIGRAM(S): 80 TABLET ORAL at 22:00

## 2025-01-02 RX ADMIN — Medication 5 MILLIGRAM(S): at 22:00

## 2025-01-02 RX ADMIN — Medication 500 MILLIGRAM(S): at 22:00

## 2025-01-02 RX ADMIN — HYDROXYCHLOROQUINE SULFATE 200 MILLIGRAM(S): 200 TABLET ORAL at 05:33

## 2025-01-02 RX ADMIN — ACETAMINOPHEN 1000 MILLIGRAM(S): 80 SOLUTION/ DROPS ORAL at 13:31

## 2025-01-02 RX ADMIN — HYDROXYCHLOROQUINE SULFATE 200 MILLIGRAM(S): 200 TABLET ORAL at 17:11

## 2025-01-02 RX ADMIN — DILTIAZEM HYDROCHLORIDE 120 MILLIGRAM(S): 300 CAPSULE, COATED, EXTENDED RELEASE ORAL at 04:23

## 2025-01-02 RX ADMIN — Medication 500 MILLIGRAM(S): at 05:33

## 2025-01-02 NOTE — PROGRESS NOTE ADULT - SUBJECTIVE AND OBJECTIVE BOX
Ortho Note    Patient seen and examined at bedside on AM rounds. Pt comfortable without complaints, pain controlled. Reports she has been ambulating with a walker. Reports chronic numbness to the b/l feet, unchanged.  Denies CP, SOB, N/V, new numbness/tingling, calf pain b/l     Vital Signs Last 24 Hrs  T(C): 36.8 (01-02-25 @ 08:16), Max: 36.9 (01-02-25 @ 05:29)  T(F): 98.3 (01-02-25 @ 08:16), Max: 98.5 (01-02-25 @ 05:29)  HR: 90 (01-02-25 @ 08:16) (85 - 90)  BP: 138/66 (01-02-25 @ 08:16) (138/66 - 177/75)  BP(mean): --  RR: 22 (01-02-25 @ 08:16) (15 - 22)  SpO2: 98% (01-02-25 @ 08:16) (97% - 100%)  I&O's Summary    01 Jan 2025 07:01  -  02 Jan 2025 07:00  --------------------------------------------------------  IN: 0 mL / OUT: 950 mL / NET: -950 mL    02 Jan 2025 07:01  -  02 Jan 2025 09:23  --------------------------------------------------------  IN: 240 mL / OUT: 200 mL / NET: 40 mL        General: Pt Alert and oriented, NAD  DSG to back (gauze, paper tape) C/D/I, one small area of gauze exposed, incision C/D/I.   Abdomen: soft, NTND  Pulses: 2+ DP / Radial pulses palpable bilaterally, skin wwp, cap refill brisk, no calf tenderness bilaterally  Sensation: Intact to light touch throughout  Motor:  EHL/FHL/TA/GS 5/5 bilaterally  Ulnar/Radial/Median nerves intact bilaterally, /Biceps/Triceps 5/5 bilaterally                          8.1    6.35  )-----------( 297      ( 01 Jan 2025 07:25 )             26.6         A/P: 83 y/o Female POD#10 s/p extension of fusion to T1 with tethers to C7   - Vitals reviewed, stable  - Appreciate medicine recommendations  - Pain Control  - OOB/IS  - Dressing to the back to be changed today  - Bowel Regimen  - DVT ppx: SCDs  - PT, WBS: WBAT   - Dispo: AR ([place]), pending insurance authorization Ortho Note    Patient seen and examined at bedside on AM rounds. Pt comfortable without complaints, pain controlled. Reports she has been ambulating with a walker. Reports chronic numbness to the b/l feet, unchanged.  Denies CP, SOB, N/V, new numbness/tingling, calf pain b/l.     Vital Signs Last 24 Hrs  T(C): 36.8 (01-02-25 @ 08:16), Max: 36.9 (01-02-25 @ 05:29)  T(F): 98.3 (01-02-25 @ 08:16), Max: 98.5 (01-02-25 @ 05:29)  HR: 90 (01-02-25 @ 08:16) (85 - 90)  BP: 138/66 (01-02-25 @ 08:16) (138/66 - 177/75)  BP(mean): --  RR: 22 (01-02-25 @ 08:16) (15 - 22)  SpO2: 98% (01-02-25 @ 08:16) (97% - 100%)  I&O's Summary    01 Jan 2025 07:01  -  02 Jan 2025 07:00  --------------------------------------------------------  IN: 0 mL / OUT: 950 mL / NET: -950 mL    02 Jan 2025 07:01  -  02 Jan 2025 09:23  --------------------------------------------------------  IN: 240 mL / OUT: 200 mL / NET: 40 mL        General: Pt Alert and oriented, NAD  DSG to back (gauze, paper tape) C/D/I, one small area of gauze exposed, incision C/D/I.   Abdomen: soft, NTND  Pulses: 2+ DP / Radial pulses palpable bilaterally, skin wwp, cap refill brisk, no calf tenderness bilaterally  Sensation: Intact to light touch throughout  Motor:  EHL/FHL/TA/GS 5/5 bilaterally  Ulnar/Radial/Median nerves intact bilaterally, /Biceps/Triceps 5/5 bilaterally                          8.1    6.35  )-----------( 297      ( 01 Jan 2025 07:25 )             26.6         A/P: 85 y/o Female POD#10 s/p extension of fusion to T1 with tethers to C7   - Vitals reviewed, stable  - Appreciate medicine recommendations  - Pain Control  - OOB/IS  - Dressing to the back to be changed today - reinforced  - Bowel Regimen  - DVT ppx: SCDs  - PT, WBS: WBAT   - Dispo: AR ([place]), pending insurance authorization

## 2025-01-02 NOTE — PROGRESS NOTE ADULT - ASSESSMENT
84 year old female with a pmh of HTN, CAD, HLD, CKD stage 3, Erosive osteoarthritis, asthma and anxiety who is known to our orthopedic surgery with recent admission and spinal surgery in Nov. 2024, now returns to [facility] with severe back pain and is pending OR on Monday for removal of hardware, exploration of fusion and extension of fusion to C7 (C7 to T3).    # back pain  - s/p WINIFRED, Exploration of fusion, extension of fusion to T1 w/ Tethers to C7 by Dr. 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  -ptn having BMs  -If diarrhea, r/o GI infection with GI pcr before imodium.     #CAD  - no history of MI or stents  - EKG nonischemic  - continue statin     # LA enlargement  Seen on echo in last admission.  - TTE showed left atrial dilation but normal EF and no significant valvular disease  - appears euvolemic  - continue with outpatient follow up with cardiology  - monitor for orthostatic symptoms    #HTN  - on home Valsartan 160 BID: BPs now better controlled  - on diltiazem 120 mg at home - continue     #Asthma  - clear lungs, no acute exacerbation - albuterol inhaler PRN    #Erosive OA  - resume home dose of plaquenil     #CKD stage 3  - Cr stable  - avoid nephrotoxins  - monitor renal function and electrolytes    # Acute blood loss anemia  # chronic iron deficiency anemia  - s/p 1 prbc; Hb stable today; will continue to monitor   - trend CBC  - maintain active T&S, two large bore IVs  - transfuse for Hgb <7     SCD for DVT ppx as per ortho

## 2025-01-02 NOTE — PROGRESS NOTE ADULT - SUBJECTIVE AND OBJECTIVE BOX
Patient is a 84y old  Female who presents with a chief complaint of back pain (02 Jan 2025 09:22)        SUBJECTIVE:  Patient was seen and examined at bedside, no acute complaints, looks forward to dispo    Overnight Events : YISELON    Last Bowel Movement: 02-Jan-2025 (01-02)  Last Bowel Movement: 02-Jan-2025 (01-02)  Last Bowel Movement: 01-Jan-2025 (01-01)  Last Bowel Movement: 01-Jan-2025 (01-01)  Last Bowel Movement: 01-Jan-2025 (01-01)  Last Bowel Movement: 31-Dec-2024 (12-31)  Last Bowel Movement: 30-Dec-2024 (12-30)  Last Bowel Movement: 28-Dec-2024 (12-30)  Last Bowel Movement: 29-Dec-2024 (12-29)  Last Bowel Movement: 28-Dec-2024 (12-29)  Last Bowel Movement: 28-Dec-2024 (12-28)  Last Bowel Movement: 27-Dec-2024 (12-28)  Last Bowel Movement: 27-Dec-2024 (12-27)  Last Bowel Movement: 27-Dec-2024 (12-27)  Last Bowel Movement: 26-Dec-2024 (12-26)      Review of systems: 12 point Review of systems negative unless otherwise documented elsewhere in note.     Diet, Regular (12-27-24 @ 13:17) [Active]      MEDICATIONS:  MEDICATIONS  (STANDING):  acetaminophen     Tablet .. 1000 milliGRAM(s) Oral every 8 hours  atorvastatin 80 milliGRAM(s) Oral at bedtime  diltiazem    milliGRAM(s) Oral daily  DULoxetine 30 milliGRAM(s) Oral daily  hydroxychloroquine 200 milliGRAM(s) Oral two times a day  methocarbamol 500 milliGRAM(s) Oral every 8 hours  pantoprazole    Tablet 40 milliGRAM(s) Oral before breakfast  polyethylene glycol 3350 17 Gram(s) Oral daily  senna 2 Tablet(s) Oral at bedtime  valsartan 160 milliGRAM(s) Oral two times a day    MEDICATIONS  (PRN):  albuterol    90 MICROgram(s) HFA Inhaler 2 Puff(s) Inhalation every 6 hours PRN PRN  loperamide 2 milliGRAM(s) Oral every 12 hours PRN Diarrhea  melatonin 5 milliGRAM(s) Oral at bedtime PRN Sleep  ondansetron   Disintegrating Tablet 4 milliGRAM(s) Oral every 6 hours PRN Nausea and/or Vomiting  simethicone 80 milliGRAM(s) Chew three times a day PRN Gas  traZODone 25 milliGRAM(s) Oral at bedtime PRN insomnia      Allergies    venlafaxine (Unknown)  penicillin (Unknown)    Intolerances        OBJECTIVE:  Vital Signs Last 24 Hrs  T(C): 36.8 (02 Jan 2025 08:16), Max: 37 (01 Jan 2025 20:45)  T(F): 98.3 (02 Jan 2025 08:16), Max: 98.6 (01 Jan 2025 20:45)  HR: 84 (02 Jan 2025 10:29) (81 - 90)  BP: 168/74 (02 Jan 2025 10:29) (138/66 - 177/75)  BP(mean): 106 (02 Jan 2025 10:29) (106 - 106)  RR: 22 (02 Jan 2025 08:16) (15 - 22)  SpO2: 98% (02 Jan 2025 08:16) (97% - 100%)    Parameters below as of 02 Jan 2025 08:16  Patient On (Oxygen Delivery Method): room air      I&O's Summary    01 Jan 2025 07:01  -  02 Jan 2025 07:00  --------------------------------------------------------  IN: 0 mL / OUT: 950 mL / NET: -950 mL    02 Jan 2025 07:01  -  02 Jan 2025 12:03  --------------------------------------------------------  IN: 240 mL / OUT: 200 mL / NET: 40 mL        PHYSICAL EXAM:  Gen: Resting in bed at time of exam, not in distress   CV: RRR, +S1/S2  Pulm: no wheezing , no crackles  no increase in work of breathing  Abd: soft, NTND  Skin: warm and dry, no new rashes   Ext: moving all 4 extremities spontaneously , no edema  ,  Neuro: AOx3, no gross focal neurological deficits  Psych: affect and behavior appropriate, pleasant at time of interview    LABS:                        8.1    6.35  )-----------( 297      ( 01 Jan 2025 07:25 )             26.6               CAPILLARY BLOOD GLUCOSE            MICRODATA:      RADIOLOGY/OTHER STUDIES:

## 2025-01-02 NOTE — PROVIDER CONTACT NOTE (OTHER) - BACKGROUND
12/23 for WINIFRED, Exploration of fusion, extension of fusion to T1 w/ Tethers to C7, Extension posterior spinal fusion T1-T3.
S/P WINIFRED , exp of fusion

## 2025-01-02 NOTE — PROGRESS NOTE ADULT - SUBJECTIVE AND OBJECTIVE BOX
Ortho Note    Pt comfortable without complaints, pain controlled  Denies CP, SOB, N/V, numbness/tingling     Vital Signs Last 24 Hrs  T(C): 36.9 (01-02-25 @ 05:29), Max: 36.9 (01-02-25 @ 05:29)  T(F): 98.5 (01-02-25 @ 05:29), Max: 98.5 (01-02-25 @ 05:29)  HR: 85 (01-02-25 @ 05:29) (85 - 90)  BP: 177/75 (01-02-25 @ 05:29) (170/74 - 177/75)  BP(mean): --  RR: 16 (01-02-25 @ 05:29) (15 - 16)  SpO2: 99% (01-02-25 @ 05:29) (99% - 100%)  I&O's Summary    31 Dec 2024 07:01  -  01 Jan 2025 07:00  --------------------------------------------------------  IN: 0 mL / OUT: 1000 mL / NET: -1000 mL    01 Jan 2025 07:01  -  02 Jan 2025 06:13  --------------------------------------------------------  IN: 0 mL / OUT: 950 mL / NET: -950 mL        General: Pt Alert and oriented, NAD  DSG C/D/I  Pulses: 2+  Sensation: SILT  Motor: 5/5 Quad/Ham/EHL/FHL/TA/GS                          8.1    6.35  )-----------( 297      ( 01 Jan 2025 07:25 )             26.6             A/P: 84yF s/p WINIFRED, Exploration of fusion, extension of fusion to T1 w/ Tethers to C7 by Dr. TONO Salguero on 12-23  - Weight Bearing Status: WBAT  - Pain control  - DVT PPx: SCDs  - PT rec: AR  - F/u AM labs  - Dispo: AR ([place]) pending auth    [facility] Pager [phone]

## 2025-01-03 PROCEDURE — 72082 X-RAY EXAM ENTIRE SPI 2/3 VW: CPT | Mod: 26

## 2025-01-03 PROCEDURE — 99232 SBSQ HOSP IP/OBS MODERATE 35: CPT

## 2025-01-03 RX ORDER — TRAZODONE HYDROCHLORIDE 150 MG/1
25 TABLET ORAL
Qty: 0 | Refills: 0 | DISCHARGE

## 2025-01-03 RX ADMIN — Medication 500 MILLIGRAM(S): at 14:20

## 2025-01-03 RX ADMIN — Medication 500 MILLIGRAM(S): at 06:40

## 2025-01-03 RX ADMIN — PANTOPRAZOLE 40 MILLIGRAM(S): 40 TABLET, DELAYED RELEASE ORAL at 06:40

## 2025-01-03 RX ADMIN — HYDROXYCHLOROQUINE SULFATE 200 MILLIGRAM(S): 200 TABLET ORAL at 06:40

## 2025-01-03 RX ADMIN — VALSARTAN 160 MILLIGRAM(S): 80 TABLET ORAL at 09:12

## 2025-01-03 RX ADMIN — ACETAMINOPHEN 1000 MILLIGRAM(S): 80 SOLUTION/ DROPS ORAL at 22:15

## 2025-01-03 RX ADMIN — ATORVASTATIN CALCIUM 80 MILLIGRAM(S): 40 TABLET, FILM COATED ORAL at 22:15

## 2025-01-03 RX ADMIN — HYDROXYCHLOROQUINE SULFATE 200 MILLIGRAM(S): 200 TABLET ORAL at 17:02

## 2025-01-03 RX ADMIN — ACETAMINOPHEN 1000 MILLIGRAM(S): 80 SOLUTION/ DROPS ORAL at 06:40

## 2025-01-03 RX ADMIN — VALSARTAN 160 MILLIGRAM(S): 80 TABLET ORAL at 22:15

## 2025-01-03 RX ADMIN — DULOXETINE HYDROCHLORIDE 30 MILLIGRAM(S): 30 CAPSULE, DELAYED RELEASE ORAL at 14:20

## 2025-01-03 RX ADMIN — ACETAMINOPHEN 1000 MILLIGRAM(S): 80 SOLUTION/ DROPS ORAL at 14:20

## 2025-01-03 RX ADMIN — Medication 500 MILLIGRAM(S): at 22:15

## 2025-01-03 RX ADMIN — DILTIAZEM HYDROCHLORIDE 120 MILLIGRAM(S): 300 CAPSULE, COATED, EXTENDED RELEASE ORAL at 06:40

## 2025-01-03 NOTE — PROVIDER CONTACT NOTE (OTHER) - ASSESSMENT
Pt is unable to sleep due to her restless leg syndrome. Pt stated she takes Trazadone 2.5mg po at bedtime for restless leg syndrome but it's not listed in her outpatient medication list.
Initial /72, HR 83. Repeat /71, HR 83. Pt denied headache, chest pain or sob. Pain level 1/10 in back at rest and 4/10 when moving. Pt's systolic BP has been trending high 160s to 170s since yesterday evening.
Patient was awake, stated she had to go the bathroom. Denies any chest pain or SOB. Asymptomatic.  /55 HR 87 O2: 98% R:18
Phlebotomist was unable to draw AM labs on pt due to hard stick.
Pt complained nausea again. Pt was belching and passing flatus. Pt stated "I can taste the last food I ate when I burp". Zofran 4mg ODT was last given at 1224AM. Asked if she can get a one time dose antinausea medication.
Pt complained of feeling "bloated", "nauseous" and upper abdominal pain "similar to the one (she) had on Sunday". Pt is belching, passing flatus and had "small" BMs today as per pt. Lower abdomen appeared distended and felt firm.
When initial round was conducted, pt confirmed refusal and adamant with such decision
Monitor technician reported that pt had Atrial tachycardia for 7 seconds, . Pt denied chest pain, palpitations or sob. /74 HR 90. Resumed ot NSR.
Monitor technician stated pt had "short burst of PAT", . Resumed to NSR. Pt denied chest pain, sob, or palpitations. /75 HR 80.
Pt alert and oriented x4. NSR, RA. 170/77, 93 bpm. Pt denies chest pain, SOB but endorsing upper back pain. Skin to upper back c/d/i.

## 2025-01-03 NOTE — CHART NOTE - NSCHARTNOTEFT_GEN_A_CORE
Admitting Diagnosis:   Patient is a 84y old  Female who presents with a chief complaint of back pain (27 Dec 2024 13:20)      PAST MEDICAL & SURGICAL HISTORY:  Stage 3 chronic kidney disease      Anxiety      HTN (hypertension)      Asthma      CAD (coronary artery disease)      HLD (hyperlipidemia)      OA (osteoarthritis of spine)      Ductal carcinoma in situ (DCIS) of left breast  RIGHT LUMPECTOMY      History of cataract surgery  BILAT      H/O bilateral hip replacements      H/O knee surgery  BILATERAL REPLACEMENTS      H/O breast surgery  LEFT BIOPSY      H/O parathyroidectomy      H/O cervical spine surgery      H/O shoulder surgery  RIGHT ROTATOR CUFF      History of back surgery      History of back surgery  LOWER      Current Nutrition Order:   Diet, Regular (12-27-24 @ 13:17)  Diet, NPO:   NPO for Procedure/Test     NPO Start Date: 27-Dec-2024,   NPO Start Time: 10:33 (12-27-24 @ 10:34)    PO Intake: N/A... pt NPO since this morning due to planned for xray today as per pt     GI Issues: passing flatus as per flowsheet, last bowel movement documented 12/26, complains of nausea overnight only relieved with anti-emetic this morning, also complains of persistent belching / bloating this morning     Pain: 0/10 as per flowsheet    Skin Integrity: surgical incisions; no edema documented; no pressure injuries documentedkaycee         12-26-24 @ 07:01  -  12-27-24 @ 07:00  --------------------------------------------------------  IN: 480 mL / OUT: 900 mL / NET: -420 mL        Labs:   12-27    144  |  101  |  15  ----------------------------<  116[H]  3.5   |  34[H]  |  0.92    Ca    8.5      27 Dec 2024 05:30      CAPILLARY BLOOD GLUCOSE          Medications:  MEDICATIONS  (STANDING):  acetaminophen     Tablet .. 1000 milliGRAM(s) Oral every 8 hours  atorvastatin 80 milliGRAM(s) Oral at bedtime  diltiazem    milliGRAM(s) Oral daily  DULoxetine 30 milliGRAM(s) Oral daily  hydroxychloroquine 200 milliGRAM(s) Oral two times a day  methocarbamol 500 milliGRAM(s) Oral every 8 hours  pantoprazole    Tablet 40 milliGRAM(s) Oral before breakfast  polyethylene glycol 3350 17 Gram(s) Oral at bedtime  senna 2 Tablet(s) Oral at bedtime    MEDICATIONS  (PRN):  albuterol    90 MICROgram(s) HFA Inhaler 2 Puff(s) Inhalation every 6 hours PRN PRN  HYDROmorphone  Injectable 0.5 milliGRAM(s) IV Push every 4 hours PRN breakthrough pain on floor  HYDROmorphone  Injectable 0.5 milliGRAM(s) IV Push every 15 minutes PRN breakthrough pain in PACU  ondansetron   Disintegrating Tablet 4 milliGRAM(s) Oral every 6 hours PRN Nausea and/or Vomiting  oxyCODONE    IR 5 milliGRAM(s) Oral every 4 hours PRN Moderate Pain (4 - 6)  oxyCODONE    IR 10 milliGRAM(s) Oral every 4 hours PRN Severe Pain (7 - 10)  simethicone 80 milliGRAM(s) Chew three times a day PRN Gas      Height for BMI (FEET)	5 Feet  Height for BMI (INCHES)	7 Inch(s)  Height for BMI (CENTIMETERS)	170.18 Centimeter(s)  Weight for BMI (lbs)	138 lb  Weight for BMI (kg)	62.6 kg  Body Mass Index	21.6  Ideal body weight 135 lb / 61.4 kg (102%)     Weight Change: Wt remains unchanged on 12/23, recommend weekly wt to track / trend wt changes.     Estimated energy needs:   Estimated Energy Needs Weight (lbs)	138 lb  Estimated Energy Needs Weight (kg)	62.5 kg  Estimated Energy Needs From (socorro/kg)	25  Estimated Energy Needs To (socorro/kg)	30  Estimated Energy Needs Calculated From (socorro/kg)	1562  Estimated Energy Needs Calculated To (socorro/kg)	1875    Estimated Protein Needs Weight (lbs)	138 lb  Estimated Protein Needs Weight (kg)	62.5 kg  Estimated Protein Needs From (g/kg)	1  Estimated Protein Needs To (g/kg)	1.3  Estimated Protein Needs Calculated From (g/kg)	62.5  Estimated Protein Needs Calculated To (g/kg)	81.25    Estimated Fluid Needs Weight (lbs)	138 lb  Estimated Fluid Needs Weight (kg)	62.5 kg  Estimated Fluid Needs From (ml/kg)	30  Estimated Fluid Needs To (ml/kg)	35  Estimated Fluid Needs Calculated From (ml/kg)	1875  Estimated Fluid Needs Calculated To (ml/kg)	2187    Other Calculations	Based on dosing wt 138 pounds as pt within % ideal body weight (102%). Need adjusted for post-op healing, clinical course, advanced age.    Subjective: "84F c/o back pain. Present for WINIFRED, Exploration of fusion, extension of fusion to C7 (C7-T3)."    Patient seen at bedside on 9WO for nutrition follow up. Current diet order: was advanced to regular diet but now made NPO since this morning due to pending abdominal xray as per pt. Endorses consuming breakfast, lunch and dinner yesterday but only able to consume ~ 1/2 sandwich for lunch and then for dinner and complains of nausea overnight requiring an anti-emetic. Pt reported UBW of ~158 pounds in April (x ~9 months ago) - unintentional wt loss of 20 pounds / 12.7% x ~9 months - not clinically significant. NFPE revealed no overt signs/ symptoms of muscle/ fat wasting - still does not meet criteria for malnutrition as per ASPEN guidelines. GI: passing flatus, last bowel movement 12/26, complains of bloating/ distended abdomen this morning. Labs: CO2 elevated, serum glucose 116 mg/dL. Meds: reviewed as above. RD will continue to follow, see nutrition recommendations below.     Previous Nutrition Diagnosis: Inadequate Energy Intake related to decreased ability to meet increased nutrient needs due to NPO with NGT in place status post exploration of fusion yesterday as evidenced by consuming <50% estimated nutrient needs x past 2 days.     Active [ x  ]  Resolved [   ]    Goal: Pt will consume >/= 75% of estimated nutrient needs via tolerated route     Recommendations:  1. Continue to advance diet as tolerated back to low-fiber diet as soon as medically feasible as per MD orders to maximize nutrient intake   **If unable to advance diet within 24-48 hrs, please re-consult RD for nutrition support recommendations   2. Once feasible, encourage and monitor PO intake, honor preferences as able   >> Consistently meet >75% of estimated needs during admission   >> Consider oral nutrition supplement or liberalizing diet should intake decline </= 50%   3. Monitor wt trends, GI function, skin integrity  4. Monitor lytes, renal indices, blood glucose, LFTs    5. Pain and bowel regimen per team   6. Anti-emetic PRN to promote PO intake   RD will continue to monitor PO intake, labs, hydration, and wt prn.    Education: RD continued to provide verbal education on typical diet progression and low-fiber diet once diet advanced, pt receptive.     Risk Level: High [ x  ] Moderate [   ] Low [   ]
Patient is able to tolerate 3 hours of physical therapy daily
-Per  patient got insurance authorization for acute rehab. Facility can take the patient tomorrow at 10AM.   -Discussed with Dr. Salguero, will obtain standing scoliosis films if able to prior to d/c  -Discussed with medicine, optimized for d/c to rehab tomorrow   -disc of imaging made for patient to go to rehab with
Admitting Diagnosis:   Patient is a 84y old  Female who presents with a chief complaint of back pain (31 Dec 2024 11:22)      PAST MEDICAL & SURGICAL HISTORY:  Stage 3 chronic kidney disease      Anxiety      HTN (hypertension)      Asthma      CAD (coronary artery disease)      HLD (hyperlipidemia)      OA (osteoarthritis of spine)      Ductal carcinoma in situ (DCIS) of left breast  RIGHT LUMPECTOMY      History of cataract surgery  BILAT      H/O bilateral hip replacements      H/O knee surgery  BILATERAL REPLACEMENTS      H/O breast surgery  LEFT BIOPSY      H/O parathyroidectomy      H/O cervical spine surgery      H/O shoulder surgery  RIGHT ROTATOR CUFF      History of back surgery      History of back surgery  LOWER      Current Nutrition Order:  Diet, Regular (12-27-24 @ 13:17) [Active]     PO Intake: Good (%) [  x ]  Fair (50-75%) [   ] Poor (<25%) [   ]    GI Issues: WNL as per flowsheet, pt reported last bowel movement this morning (12/31)     Pain: 0/10 as per flowsheet    Skin Integrity: surgical incisions; no pressure injuries documented, no edema documented, kaycee 20        12-30-24 @ 07:01  -  12-31-24 @ 07:00  --------------------------------------------------------  IN: 240 mL / OUT: 430 mL / NET: -190 mL        Labs:   12-30    142  |  111[H]  |  7   ----------------------------<  108[H]  4.5   |  19[L]  |  0.76    Ca    8.8      30 Dec 2024 05:30      CAPILLARY BLOOD GLUCOSE          Medications:  MEDICATIONS  (STANDING):  acetaminophen     Tablet .. 1000 milliGRAM(s) Oral every 8 hours  atorvastatin 80 milliGRAM(s) Oral at bedtime  diltiazem    milliGRAM(s) Oral daily  DULoxetine 30 milliGRAM(s) Oral daily  hydroxychloroquine 200 milliGRAM(s) Oral two times a day  methocarbamol 500 milliGRAM(s) Oral every 8 hours  pantoprazole    Tablet 40 milliGRAM(s) Oral before breakfast  polyethylene glycol 3350 17 Gram(s) Oral daily  senna 2 Tablet(s) Oral at bedtime  valsartan 160 milliGRAM(s) Oral daily    MEDICATIONS  (PRN):  albuterol    90 MICROgram(s) HFA Inhaler 2 Puff(s) Inhalation every 6 hours PRN PRN  loperamide 2 milliGRAM(s) Oral every 12 hours PRN Diarrhea  melatonin 5 milliGRAM(s) Oral at bedtime PRN Sleep  ondansetron   Disintegrating Tablet 4 milliGRAM(s) Oral every 6 hours PRN Nausea and/or Vomiting  oxyCODONE    IR 5 milliGRAM(s) Oral every 4 hours PRN Moderate Pain (4 - 6)  oxyCODONE    IR 10 milliGRAM(s) Oral every 4 hours PRN Severe Pain (7 - 10)  simethicone 80 milliGRAM(s) Chew three times a day PRN Gas  traZODone 25 milliGRAM(s) Oral at bedtime PRN insomnia      Height for BMI (FEET)	5 Feet  Height for BMI (INCHES)	7 Inch(s)  Height for BMI (CENTIMETERS)	170.18 Centimeter(s)  Weight for BMI (lbs)	138 lb  Weight for BMI (kg)	62.6 kg  Body Mass Index	21.6  Ideal body weight 135 lb / 61.4 kg (102%)     Weight Change: Wt remains unchanged on 12/23, recommend weekly wt to track / trend wt changes.     Estimated energy needs:   Estimated Energy Needs Weight (lbs)	138 lb  Estimated Energy Needs Weight (kg)	62.5 kg  Estimated Energy Needs From (socorro/kg)	25  Estimated Energy Needs To (socorro/kg)	30  Estimated Energy Needs Calculated From (socorro/kg)	1562  Estimated Energy Needs Calculated To (socorro/kg)	1875    Estimated Protein Needs Weight (lbs)	138 lb  Estimated Protein Needs Weight (kg)	62.5 kg  Estimated Protein Needs From (g/kg)	1  Estimated Protein Needs To (g/kg)	1.3  Estimated Protein Needs Calculated From (g/kg)	62.5  Estimated Protein Needs Calculated To (g/kg)	81.25    Estimated Fluid Needs Weight (lbs)	138 lb  Estimated Fluid Needs Weight (kg)	62.5 kg  Estimated Fluid Needs From (ml/kg)	30  Estimated Fluid Needs To (ml/kg)	35  Estimated Fluid Needs Calculated From (ml/kg)	1875  Estimated Fluid Needs Calculated To (ml/kg)	2187    Other Calculations	Based on dosing wt 138 pounds as pt within % ideal body weight (102%). Need adjusted for post-op healing, clinical course, advanced age.    Subjective: "84F c/o back pain. Present for WINIFRED, Exploration of fusion, extension of fusion to C7 (C7-T3)."    Patient seen at bedside on 9WO for nutrition follow up. Current diet order: regular. Reports good appetite, endorsed consuming 100% of breakfast this morning consisting of pancakes and turkey sausage, now likely meeting >/= 75% estimated nutrient needs at this time. Denies nausea/vomiting/diarrhea/constipation, reports last bowel movement this morning 12/31. Labs: chloride elevated, CO2 low, serum glucose 108 mg/dL. Meds reviewed as above. RD will continue to follow, see nutrition recommendations below.     Previous Nutrition Diagnosis: Inadequate Energy Intake related to decreased ability to meet increased nutrient needs due to NPO with NGT in place status post exploration of fusion yesterday as evidenced by consuming <50% estimated nutrient needs x past 2 days.     Active [   ]  Resolved [ x  ]    If resolved, new PES: Increased nutrient needs... protein related to physiological demands as evidenced by post-op healing, clinical course, advanced age.     Goal: Pt will consume >/= 75% of estimated nutrient needs     Recommendations:  1. Continue with current diet order   2. Encourage and monitor PO intake, honor preferences as able   >> Consistently meet >75% of estimated needs during admission   >> Consider oral nutrition supplement should intake decline </= 50%   3. Monitor wt trends, GI function, skin integrity  4. Monitor lytes, renal indices, blood glucose, LFTs    5. Pain and bowel regimen per team   6. Anti-emetic PRN to promote PO intake   RD will continue to monitor PO intake, labs, hydration, and wt prn.    Education: RD provided verbal nutrition education on PO encouragement, encouraged high-protein foods with each meal and low-fiber options as well, pt receptive.     Risk Level: High [   ] Moderate [  x ] Low [   ]

## 2025-01-03 NOTE — PROGRESS NOTE ADULT - ASSESSMENT
84 year old female with a pmh of HTN, CAD, HLD, CKD stage 3, Erosive osteoarthritis, asthma and anxiety who is known to our orthopedic surgery with recent admission and spinal surgery in Nov. 2024, now returns to [facility] with severe back pain and is pending OR on Monday for removal of hardware, exploration of fusion and extension of fusion to C7 (C7 to T3).    # back pain  - s/p WINIFRED, Exploration of fusion, extension of fusion to T1 w/ Tethers to C7 by Dr. 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  -ptn having BMs  -If diarrhea, r/o GI infection with GI pcr before imodium.     #CAD  - no history of MI or stents  - EKG nonischemic  - continue statin     # LA enlargement  Seen on echo in last admission.  - TTE showed left atrial dilation but normal EF and no significant valvular disease  - appears euvolemic  - continue with outpatient follow up with cardiology  - monitor for orthostatic symptoms    #HTN  - on home Valsartan 160 BID: BPs now better controlled  - on diltiazem 120 mg at home - continue     #Asthma  - clear lungs, no acute exacerbation - albuterol inhaler PRN    #Erosive OA  - resume home dose of plaquenil     #CKD stage 3  - Cr stable  - avoid nephrotoxins  - monitor renal function and electrolytes    # Acute blood loss anemia  # chronic iron deficiency anemia  - s/p 1 prbc; Hb stable today; will continue to monitor   - trend CBC  - maintain active T&S, two large bore IVs  - transfuse for Hgb <7     SCD for DVT ppx as per ortho

## 2025-01-03 NOTE — PROGRESS NOTE ADULT - TIME BILLING
Bedside exam and interview   Reviewed vitals, labs   Discussed patient's plan of care with primary  Documentation of encounter  Excludes teaching and separately reported services
Review of hospital course, labs, vitals, medical records.   Bedside exam and interview   Discussed plan of care with primary team ACP and housestaff   Documenting the encounter
Bedside exam and interview   Reviewed vitals, labs   Discussed patient's plan of care with primary  Documentation of encounter  Excludes teaching and separately reported services
Review of hospital course, labs, vitals, medical records.   Bedside exam and interview   Discussed plan of care with primary team ACP and housestaff   Documenting the encounter
Bedside exam and interview   Reviewed vitals, labs   Discussed patient's plan of care with primary  Documentation of encounter  Excludes teaching and separately reported services
Review of hospital course, labs, vitals, medical records.   Bedside exam and interview   Discussed plan of care with primary team ACP and housestaff   Documenting the encounter
Review of hospital course, labs, vitals, medical records.   Bedside exam and interview   Discussed plan of care with primary team ACP and housestaff   Documenting the encounter
Bedside exam and interview   Reviewed vitals, labs   Discussed patient's plan of care with housestaff   Documentation of encounter  Excludes teaching and separately reported services
Bedside exam and interview   Reviewed vitals, labs   Discussed patient's plan of care with primary  Documentation of encounter  Excludes teaching and separately reported services
Review of hospital course, labs, vitals, medical records.   Bedside exam and interview   Discussed plan of care with primary team ACP and housestaff   Documenting the encounter
Bedside exam and interview.  Reviewed of laboratory data, radiology results, consultants' recommendations.   Discussion with patient/caregivers/housestaff and interdisciplinary staff (such as , social workers, etc).  Documentation of encounter.  Interventions were performed as documented above.  Excludes teaching time and/or separately reported services.
Review of hospital course, labs, vitals, medical records.   Bedside exam and interview   Discussed plan of care with primary team ACP and housestaff   Documenting the encounter

## 2025-01-03 NOTE — PROVIDER CONTACT NOTE (OTHER) - ACTION/TREATMENT ORDERED:
6AM dose of Cardizem 120mg CD po once daily given early.
AM lab draw rescheduled for 10AM.
John ZAMORANO aware of hypertension, no interventions at this time. Ok to keep an eye on upper back, as long as site is intact and dry- no interventions at this time. Care ongoing
Was awaiting reply. Gave pt ice chips instead. Zofran 4mg ODT was then given at 608AM.
No intervention at this time. Will continue to monitor.
Valsartan 160mg po once daily changed to twice a day and was given.
MD dwyer
Melatonin 5mg po for insomnia was later ordered. Pt is currently asleep so medication not given at this time.
No interventions at this time.
Zofran 4mg ODT prn given for nausea. Warm packs offered for abdominal pain but pt stated she "felt warm". Ice packs given to cool pt instead. No additional interventions at this time.

## 2025-01-03 NOTE — PROGRESS NOTE ADULT - PROVIDER SPECIALTY LIST ADULT
Hospitalist
Hospitalist
Orthopedics
Hospitalist
Orthopedics
Surgery
Surgery
Hospitalist
Orthopedics
Hospitalist

## 2025-01-03 NOTE — PROVIDER CONTACT NOTE (OTHER) - DATE AND TIME:
02-Jan-2025 04:41
27-Dec-2024 06:02
28-Dec-2024 06:08
02-Jan-2025 21:10
27-Dec-2024 00:21
02-Jan-2025 01:45
27-Dec-2024 22:02
30-Dec-2024 21:21
27-Dec-2024 06:23
31-Dec-2024 21:05

## 2025-01-03 NOTE — PROGRESS NOTE ADULT - SUBJECTIVE AND OBJECTIVE BOX
Patient is a 84y old  Female who presents with a chief complaint of back pain (03 Jan 2025 06:27)        SUBJECTIVE:  Patient was seen and examined at bedside, no acute complaints, looking forward to dispo    Overnight Events : YISELON    Last Bowel Movement: 02-Jan-2025 (01-03)  Last Bowel Movement: 02-Jan-2025 (01-02)  Last Bowel Movement: 02-Jan-2025 (01-02)  Last Bowel Movement: 02-Jan-2025 (01-02)  Last Bowel Movement: 01-Jan-2025 (01-01)  Last Bowel Movement: 01-Jan-2025 (01-01)  Last Bowel Movement: 01-Jan-2025 (01-01)  Last Bowel Movement: 31-Dec-2024 (12-31)  Last Bowel Movement: 30-Dec-2024 (12-30)  Last Bowel Movement: 28-Dec-2024 (12-30)  Last Bowel Movement: 29-Dec-2024 (12-29)  Last Bowel Movement: 28-Dec-2024 (12-29)  Last Bowel Movement: 28-Dec-2024 (12-28)  Last Bowel Movement: 27-Dec-2024 (12-28)  Last Bowel Movement: 27-Dec-2024 (12-27)      Review of systems: 12 point Review of systems negative unless otherwise documented elsewhere in note.     Diet, Regular (12-27-24 @ 13:17) [Active]      MEDICATIONS:  MEDICATIONS  (STANDING):  acetaminophen     Tablet .. 1000 milliGRAM(s) Oral every 8 hours  atorvastatin 80 milliGRAM(s) Oral at bedtime  diltiazem    milliGRAM(s) Oral daily  DULoxetine 30 milliGRAM(s) Oral daily  hydroxychloroquine 200 milliGRAM(s) Oral two times a day  methocarbamol 500 milliGRAM(s) Oral every 8 hours  pantoprazole    Tablet 40 milliGRAM(s) Oral before breakfast  polyethylene glycol 3350 17 Gram(s) Oral daily  senna 2 Tablet(s) Oral at bedtime  valsartan 160 milliGRAM(s) Oral two times a day    MEDICATIONS  (PRN):  albuterol    90 MICROgram(s) HFA Inhaler 2 Puff(s) Inhalation every 6 hours PRN PRN  loperamide 2 milliGRAM(s) Oral every 12 hours PRN Diarrhea  melatonin 5 milliGRAM(s) Oral at bedtime PRN Sleep  ondansetron   Disintegrating Tablet 4 milliGRAM(s) Oral every 6 hours PRN Nausea and/or Vomiting  simethicone 80 milliGRAM(s) Chew three times a day PRN Gas  traZODone 25 milliGRAM(s) Oral at bedtime PRN insomnia      Allergies    venlafaxine (Unknown)  penicillin (Unknown)    Intolerances        OBJECTIVE:  Vital Signs Last 24 Hrs  T(C): 36.9 (03 Jan 2025 08:27), Max: 37.2 (02 Jan 2025 16:53)  T(F): 98.4 (03 Jan 2025 08:27), Max: 99 (02 Jan 2025 21:58)  HR: 87 (03 Jan 2025 08:27) (82 - 95)  BP: 157/69 (03 Jan 2025 08:27) (145/70 - 157/69)  BP(mean): 95 (02 Jan 2025 21:58) (95 - 95)  RR: 22 (03 Jan 2025 08:27) (16 - 22)  SpO2: 97% (03 Jan 2025 08:27) (94% - 99%)    Parameters below as of 03 Jan 2025 08:27  Patient On (Oxygen Delivery Method): room air      I&O's Summary    02 Jan 2025 07:01  -  03 Jan 2025 07:00  --------------------------------------------------------  IN: 240 mL / OUT: 1200 mL / NET: -960 mL    03 Jan 2025 07:01  -  03 Jan 2025 11:45  --------------------------------------------------------  IN: 480 mL / OUT: 0 mL / NET: 480 mL        PHYSICAL EXAM:  Gen: Resting in bed at time of exam, not in distress   CV: RRR, +S1/S2  Pulm: no wheezing , no crackles  no increase in work of breathing  Abd: soft, NTND  Skin: warm and dry, no new rashes   Ext: moving all 4 extremities spontaneously , no edema  ,  Neuro: AOx3, no gross focal neurological deficits  Psych: affect and behavior appropriate, pleasant at time of interview    LABS:              CAPILLARY BLOOD GLUCOSE            MICRODATA:      RADIOLOGY/OTHER STUDIES:

## 2025-01-03 NOTE — PROVIDER CONTACT NOTE (OTHER) - SITUATION
Notified by tele tech, patient had 11 beats of V tach.
Pt c/o right upper back pain to site where hemovac was removed.
Received report from day RN that pt's  previous IV was pulled out as it was reddened. However, pt reportedly refused reinsertion

## 2025-01-03 NOTE — PROGRESS NOTE ADULT - REASON FOR ADMISSION
back pain

## 2025-01-03 NOTE — PROGRESS NOTE ADULT - SUBJECTIVE AND OBJECTIVE BOX
Ortho Note    Pt comfortable without complaints, pain controlled  Denies CP, SOB, N/V, numbness/tingling     Vital Signs Last 24 Hrs  T(C): 37 (01-03-25 @ 05:52), Max: 37 (01-03-25 @ 05:52)  T(F): 98.6 (01-03-25 @ 05:52), Max: 98.6 (01-03-25 @ 05:52)  HR: 82 (01-03-25 @ 05:52) (82 - 82)  BP: 149/67 (01-03-25 @ 05:52) (149/67 - 149/67)  BP(mean): --  RR: 16 (01-03-25 @ 05:52) (16 - 16)  SpO2: 97% (01-03-25 @ 05:52) (97% - 97%)  I&O's Summary    01 Jan 2025 07:01  -  02 Jan 2025 07:00  --------------------------------------------------------  IN: 0 mL / OUT: 950 mL / NET: -950 mL    02 Jan 2025 07:01  -  03 Jan 2025 06:28  --------------------------------------------------------  IN: 240 mL / OUT: 1200 mL / NET: -960 mL        General: Pt Alert and oriented, NAD  DSG C/D/I  Pulses: 2+  Sensation: SILT  Motor: 5/5 Quad/Ham/EHL/FHL/TA/GS                          8.1    6.35  )-----------( 297      ( 01 Jan 2025 07:25 )             26.6             A/P: 84yF s/p WINIFRED, Exploration of fusion, extension of fusion to T1 w/ Tethers to C7 by Dr. TONO Salguero on 12-23  - Weight Bearing Status: WBAT  - Pain control  - DVT PPx: SCDs  - PT rec: AR  - F/u AM labs  - Dispo: AR (Luo) pending insurance auth    Ortho Pager [phone]

## 2025-01-03 NOTE — PROVIDER CONTACT NOTE (OTHER) - NAME OF MD/NP/PA/DO NOTIFIED:
Dr. Jake Lau
Jacob Lopez MD
Jake Lau MD
Jake Lau MD via Teams and [employer] [phone]
Maria Isabel Ortega
Robi Robbins MD via [employer] [phone]
Maria Isabel Ortega MD via [facility] [phone]
Maria Isabel Ortega MD via [employer] [phone]
Maria Isabel Ortega MD via "[employer]" [phone]

## 2025-01-04 ENCOUNTER — TRANSCRIPTION ENCOUNTER (OUTPATIENT)
Age: 85
End: 2025-01-04

## 2025-01-04 VITALS
OXYGEN SATURATION: 100 % | HEART RATE: 81 BPM | DIASTOLIC BLOOD PRESSURE: 70 MMHG | SYSTOLIC BLOOD PRESSURE: 132 MMHG | TEMPERATURE: 98 F | RESPIRATION RATE: 18 BRPM

## 2025-01-04 PROCEDURE — 97535 SELF CARE MNGMENT TRAINING: CPT

## 2025-01-04 PROCEDURE — 82805 BLOOD GASES W/O2 SATURATION: CPT

## 2025-01-04 PROCEDURE — C1713: CPT

## 2025-01-04 PROCEDURE — 97110 THERAPEUTIC EXERCISES: CPT

## 2025-01-04 PROCEDURE — 36415 COLL VENOUS BLD VENIPUNCTURE: CPT

## 2025-01-04 PROCEDURE — 86901 BLOOD TYPING SEROLOGIC RH(D): CPT

## 2025-01-04 PROCEDURE — 86902 BLOOD TYPE ANTIGEN DONOR EA: CPT

## 2025-01-04 PROCEDURE — 97161 PT EVAL LOW COMPLEX 20 MIN: CPT

## 2025-01-04 PROCEDURE — 80053 COMPREHEN METABOLIC PANEL: CPT

## 2025-01-04 PROCEDURE — 84132 ASSAY OF SERUM POTASSIUM: CPT

## 2025-01-04 PROCEDURE — 82947 ASSAY GLUCOSE BLOOD QUANT: CPT

## 2025-01-04 PROCEDURE — 72082 X-RAY EXAM ENTIRE SPI 2/3 VW: CPT

## 2025-01-04 PROCEDURE — 76000 FLUOROSCOPY <1 HR PHYS/QHP: CPT

## 2025-01-04 PROCEDURE — C1889: CPT

## 2025-01-04 PROCEDURE — 97116 GAIT TRAINING THERAPY: CPT

## 2025-01-04 PROCEDURE — 85027 COMPLETE CBC AUTOMATED: CPT

## 2025-01-04 PROCEDURE — 85025 COMPLETE CBC W/AUTO DIFF WBC: CPT

## 2025-01-04 PROCEDURE — 86900 BLOOD TYPING SEROLOGIC ABO: CPT

## 2025-01-04 PROCEDURE — 86850 RBC ANTIBODY SCREEN: CPT

## 2025-01-04 PROCEDURE — P9016: CPT

## 2025-01-04 PROCEDURE — 82962 GLUCOSE BLOOD TEST: CPT

## 2025-01-04 PROCEDURE — 74018 RADEX ABDOMEN 1 VIEW: CPT

## 2025-01-04 PROCEDURE — 86880 COOMBS TEST DIRECT: CPT

## 2025-01-04 PROCEDURE — 86870 RBC ANTIBODY IDENTIFICATION: CPT

## 2025-01-04 PROCEDURE — 71045 X-RAY EXAM CHEST 1 VIEW: CPT

## 2025-01-04 PROCEDURE — 36430 TRANSFUSION BLD/BLD COMPNT: CPT

## 2025-01-04 PROCEDURE — 82330 ASSAY OF CALCIUM: CPT

## 2025-01-04 PROCEDURE — 97166 OT EVAL MOD COMPLEX 45 MIN: CPT

## 2025-01-04 PROCEDURE — 97530 THERAPEUTIC ACTIVITIES: CPT

## 2025-01-04 PROCEDURE — 86922 COMPATIBILITY TEST ANTIGLOB: CPT

## 2025-01-04 PROCEDURE — 80048 BASIC METABOLIC PNL TOTAL CA: CPT

## 2025-01-04 PROCEDURE — 84295 ASSAY OF SERUM SODIUM: CPT

## 2025-01-04 RX ADMIN — PANTOPRAZOLE 40 MILLIGRAM(S): 40 TABLET, DELAYED RELEASE ORAL at 06:26

## 2025-01-04 RX ADMIN — HYDROXYCHLOROQUINE SULFATE 200 MILLIGRAM(S): 200 TABLET ORAL at 06:26

## 2025-01-04 RX ADMIN — DILTIAZEM HYDROCHLORIDE 120 MILLIGRAM(S): 300 CAPSULE, COATED, EXTENDED RELEASE ORAL at 06:26

## 2025-01-04 RX ADMIN — ACETAMINOPHEN 1000 MILLIGRAM(S): 80 SOLUTION/ DROPS ORAL at 06:26

## 2025-01-04 RX ADMIN — VALSARTAN 160 MILLIGRAM(S): 80 TABLET ORAL at 09:14

## 2025-01-04 RX ADMIN — Medication 500 MILLIGRAM(S): at 06:26

## 2025-01-04 NOTE — DISCHARGE NOTE NURSING/CASE MANAGEMENT/SOCIAL WORK - PATIENT PORTAL LINK FT
You can access the FollowMyHealth Patient Portal offered by [facility] by registering at the following website: [email or web address]. By joining [facility]’s FollowMyHealth portal, you will also be able to view your health information using other applications (apps) compatible with our system.

## 2025-01-04 NOTE — DISCHARGE NOTE NURSING/CASE MANAGEMENT/SOCIAL WORK - FINANCIAL ASSISTANCE
[facility] provides services at a reduced cost to those who are determined to be eligible through [facility]’s financial assistance program. Information regarding [facility]’s financial assistance program can be found by going to [email or web address] or by calling [phone].

## 2025-01-13 ENCOUNTER — TRANSCRIPTION ENCOUNTER (OUTPATIENT)
Age: 85
End: 2025-01-13

## 2025-01-14 ENCOUNTER — APPOINTMENT (OUTPATIENT)
Dept: ORTHOPEDIC SURGERY | Facility: CLINIC | Age: 85
End: 2025-01-14
Payer: MEDICARE

## 2025-01-14 ENCOUNTER — TRANSCRIPTION ENCOUNTER (OUTPATIENT)
Age: 85
End: 2025-01-14

## 2025-01-14 VITALS — HEART RATE: 83 BPM | SYSTOLIC BLOOD PRESSURE: 112 MMHG | DIASTOLIC BLOOD PRESSURE: 56 MMHG | OXYGEN SATURATION: 99 %

## 2025-01-14 DIAGNOSIS — M54.6 PAIN IN THORACIC SPINE: ICD-10-CM

## 2025-01-14 DIAGNOSIS — M75.100 UNSPECIFIED ROTATOR CUFF TEAR OR RUPTURE OF UNSPECIFIED SHOULDER, NOT SPECIFIED AS TRAUMATIC: ICD-10-CM

## 2025-01-14 PROCEDURE — 99024 POSTOP FOLLOW-UP VISIT: CPT

## 2025-01-14 PROCEDURE — 72082 X-RAY EXAM ENTIRE SPI 2/3 VW: CPT

## 2025-01-15 DIAGNOSIS — N18.30 CHRONIC KIDNEY DISEASE, STAGE 3 UNSPECIFIED: ICD-10-CM

## 2025-01-15 DIAGNOSIS — I12.9 HYPERTENSIVE CHRONIC KIDNEY DISEASE WITH STAGE 1 THROUGH STAGE 4 CHRONIC KIDNEY DISEASE, OR UNSPECIFIED CHRONIC KIDNEY DISEASE: ICD-10-CM

## 2025-01-15 DIAGNOSIS — G95.20 UNSPECIFIED CORD COMPRESSION: ICD-10-CM

## 2025-01-15 DIAGNOSIS — Z96.643 PRESENCE OF ARTIFICIAL HIP JOINT, BILATERAL: ICD-10-CM

## 2025-01-15 DIAGNOSIS — M48.03 SPINAL STENOSIS, CERVICOTHORACIC REGION: ICD-10-CM

## 2025-01-15 DIAGNOSIS — M50.03 CERVICAL DISC DISORDER WITH MYELOPATHY, CERVICOTHORACIC REGION: ICD-10-CM

## 2025-01-15 DIAGNOSIS — D62 ACUTE POSTHEMORRHAGIC ANEMIA: ICD-10-CM
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
## 2025-01-15 DIAGNOSIS — T84.296A OTHER MECHANICAL COMPLICATION OF INTERNAL FIXATION DEVICE OF VERTEBRAE, INITIAL ENCOUNTER: ICD-10-CM

## 2025-01-15 DIAGNOSIS — K56.41 FECAL IMPACTION: ICD-10-CM

## 2025-01-15 DIAGNOSIS — Z86.000 PERSONAL HISTORY OF IN-SITU NEOPLASM OF BREAST: ICD-10-CM

## 2025-01-15 DIAGNOSIS — E78.5 HYPERLIPIDEMIA, UNSPECIFIED: ICD-10-CM

## 2025-01-15 DIAGNOSIS — M40.203 UNSPECIFIED KYPHOSIS, CERVICOTHORACIC REGION: ICD-10-CM

## 2025-01-15 DIAGNOSIS — M48.00 SPINAL STENOSIS, SITE UNSPECIFIED: ICD-10-CM

## 2025-01-15 DIAGNOSIS — I25.10 ATHEROSCLEROTIC HEART DISEASE OF NATIVE CORONARY ARTERY WITHOUT ANGINA PECTORIS: ICD-10-CM

## 2025-01-15 DIAGNOSIS — D50.9 IRON DEFICIENCY ANEMIA, UNSPECIFIED: ICD-10-CM

## 2025-01-15 DIAGNOSIS — M15.4 EROSIVE (OSTEO)ARTHRITIS: ICD-10-CM

## 2025-01-15 PROBLEM — M54.6 THORACIC BACK PAIN, UNSPECIFIED BACK PAIN LATERALITY, UNSPECIFIED CHRONICITY: Status: ACTIVE | Noted: 2025-01-15

## 2025-01-16 DIAGNOSIS — Z98.890 OTHER SPECIFIED POSTPROCEDURAL STATES: ICD-10-CM

## 2025-02-11 ENCOUNTER — APPOINTMENT (OUTPATIENT)
Dept: ORTHOPEDIC SURGERY | Facility: CLINIC | Age: 85
End: 2025-02-11

## 2025-02-11 DIAGNOSIS — Z98.890 OTHER SPECIFIED POSTPROCEDURAL STATES: ICD-10-CM

## 2025-02-11 DIAGNOSIS — M54.12 RADICULOPATHY, CERVICAL REGION: ICD-10-CM

## 2025-02-11 DIAGNOSIS — R29.898 OTHER SYMPTOMS AND SIGNS INVOLVING THE MUSCULOSKELETAL SYSTEM: ICD-10-CM

## 2025-02-11 PROCEDURE — 99024 POSTOP FOLLOW-UP VISIT: CPT

## 2025-02-11 PROCEDURE — 72082 X-RAY EXAM ENTIRE SPI 2/3 VW: CPT

## 2025-02-19 ENCOUNTER — RESULT REVIEW (OUTPATIENT)
Age: 85
End: 2025-02-19

## 2025-02-27 ENCOUNTER — RESULT REVIEW (OUTPATIENT)
Age: 85
End: 2025-02-27

## 2025-02-27 DIAGNOSIS — R92.2 INCONCLUSIVE MAMMOGRAM: ICD-10-CM

## 2025-03-04 ENCOUNTER — APPOINTMENT (OUTPATIENT)
Dept: ORTHOPEDIC SURGERY | Facility: CLINIC | Age: 85
End: 2025-03-04

## 2025-03-04 DIAGNOSIS — R20.0 ANESTHESIA OF SKIN: ICD-10-CM

## 2025-03-04 DIAGNOSIS — Z98.890 OTHER SPECIFIED POSTPROCEDURAL STATES: ICD-10-CM

## 2025-03-04 PROCEDURE — 72082 X-RAY EXAM ENTIRE SPI 2/3 VW: CPT

## 2025-03-04 PROCEDURE — 99024 POSTOP FOLLOW-UP VISIT: CPT

## 2025-03-11 ENCOUNTER — APPOINTMENT (OUTPATIENT)
Dept: ORTHOPEDIC SURGERY | Facility: CLINIC | Age: 85
End: 2025-03-11

## 2025-05-06 ENCOUNTER — APPOINTMENT (OUTPATIENT)
Dept: BREAST CENTER | Facility: CLINIC | Age: 85
End: 2025-05-06
Payer: MEDICARE

## 2025-05-06 VITALS
BODY MASS INDEX: 21.76 KG/M2 | HEIGHT: 66.5 IN | DIASTOLIC BLOOD PRESSURE: 64 MMHG | HEART RATE: 74 BPM | SYSTOLIC BLOOD PRESSURE: 120 MMHG | WEIGHT: 137 LBS

## 2025-05-06 DIAGNOSIS — Z12.31 ENCOUNTER FOR SCREENING MAMMOGRAM FOR MALIGNANT NEOPLASM OF BREAST: ICD-10-CM

## 2025-05-06 DIAGNOSIS — R92.2 INCONCLUSIVE MAMMOGRAM: ICD-10-CM

## 2025-05-06 DIAGNOSIS — Z80.3 FAMILY HISTORY OF MALIGNANT NEOPLASM OF BREAST: ICD-10-CM

## 2025-05-06 DIAGNOSIS — R92.30 DENSE BREASTS, UNSPECIFIED: ICD-10-CM

## 2025-05-06 PROCEDURE — 99213 OFFICE O/P EST LOW 20 MIN: CPT

## 2025-05-21 NOTE — H&P ADULT - PROBLEM/PLAN-4
Medical Week 1 Survey      Flowsheet Row Responses   [facility] patient discharged from? [facility]   Does the patient have one of the following disease processes/diagnoses(primary or secondary)? Other   Week 1 attempt successful? No   Unsuccessful attempts Attempt 1            ALEX MAYER - Registered Nurse  
DISPLAY PLAN FREE TEXT

## 2025-07-08 ENCOUNTER — APPOINTMENT (OUTPATIENT)
Dept: ORTHOPEDIC SURGERY | Facility: CLINIC | Age: 85
End: 2025-07-08
Payer: MEDICARE

## 2025-07-08 ENCOUNTER — NON-APPOINTMENT (OUTPATIENT)
Age: 85
End: 2025-07-08

## 2025-07-08 VITALS
OXYGEN SATURATION: 98 % | HEART RATE: 82 BPM | BODY MASS INDEX: 22.02 KG/M2 | HEIGHT: 66 IN | SYSTOLIC BLOOD PRESSURE: 149 MMHG | WEIGHT: 137 LBS | DIASTOLIC BLOOD PRESSURE: 76 MMHG | TEMPERATURE: 96 F

## 2025-07-08 PROCEDURE — 72082 X-RAY EXAM ENTIRE SPI 2/3 VW: CPT

## 2025-07-08 PROCEDURE — 99212 OFFICE O/P EST SF 10 MIN: CPT

## 2025-07-09 PROBLEM — R42 VERTIGO: Status: ACTIVE | Noted: 2025-07-09

## (undated) DEVICE — Device

## (undated) DEVICE — FRAZIER SUCTION TIP 10FR

## (undated) DEVICE — LAVAGE SIMPLUSE

## (undated) DEVICE — PACK SPINE

## (undated) DEVICE — MIDAS REX MR8 MATCH HEAD FLUTED LG BORE 3MM X 14CM

## (undated) DEVICE — DRAIN JACKSON PRATT 3 SPRING RESERVOIR W 10FR PVC DRAIN

## (undated) DEVICE — MIDAS REX MR8 BALL FLUTED LG BORE 5MM X 14CM

## (undated) DEVICE — SUT VICRYL 2-0 27" SH UNDYED

## (undated) DEVICE — DRAPE 3/4 SHEET 52X76"

## (undated) DEVICE — SUT VICRYL 1 36" CTB-1 UNDYED

## (undated) DEVICE — STRYKER PULSE LAVAGE WITH BONE CLEANING TIP

## (undated) DEVICE — NDL SPINAL 18G X 3.5" (PINK)

## (undated) DEVICE — RAYTEC SPONGE 4X4 16PLY

## (undated) DEVICE — ELCTR STRYKER NEPTUNE SMOKE EVACUATION PENCIL (GREEN)

## (undated) DEVICE — DRAPE GENERAL ENDOSCOPY

## (undated) DEVICE — DRILL BIT SYNTHES ORTHO QCP 3.2X195MM ST

## (undated) DEVICE — GLV 9 PROTEXIS ORTHO (BROWN)

## (undated) DEVICE — PREP DURAPREP 26CC

## (undated) DEVICE — STAPLER SKIN PROXIMATE

## (undated) DEVICE — VENODYNE/SCD SLEEVE CALF MEDIUM

## (undated) DEVICE — STRYKER BONE MILL BLADE MEDIUM 5.0MM

## (undated) DEVICE — WARMING BLANKET UPPER ADULT

## (undated) DEVICE — DRILL BIT STRYKER PRECISION NEURO 3X3.8MM

## (undated) DEVICE — SUT STRATAFIX SYMMETRIC PDS 1 45CM OS-6

## (undated) DEVICE — SUT VICRYL 2-0 27" CT-1 UNDYED

## (undated) DEVICE — DRAPE BACK TABLE COVER 80X90"

## (undated) DEVICE — DRAPE 1/2 SHEET 40X57"

## (undated) DEVICE — SUCTION YANKAUER NO CONTROL VENT

## (undated) DEVICE — BUR STRYKER MULTI FLUTE ROUND 4MM

## (undated) DEVICE — FRAZIER SUCTION TIP 12FR

## (undated) DEVICE — SUT QUILL MONODERM 2-0 60CM 24MM UNDYED

## (undated) DEVICE — POSITIONER JACKSON TABLE XODUS

## (undated) DEVICE — DRAPE INSTRUMENT POUCH 6.75" X 11"

## (undated) DEVICE — ELCTR AQUAMANTYS BIPOLAR SEALER 6.0

## (undated) DEVICE — DRILL BIT SYNTHES ORTHO QCP GLD 2.5X180MM ST

## (undated) DEVICE — FOLEY TRAY 16FR 5CC LF UMETER CLOSED

## (undated) DEVICE — DEPUY CANNULA FEN OPEN STERILE

## (undated) DEVICE — COVER BACK TABLE STRL 80/110IN 10/CA

## (undated) DEVICE — SPONGE PEANUT AUTO COUNT

## (undated) DEVICE — WARMING BLANKET LOWER ADULT

## (undated) DEVICE — DRILL BIT SYNTHES ORTHO CANN QCP 2X125MM ST

## (undated) DEVICE — BUR STRYKER MULTI FLUTE ROUND 5MM